# Patient Record
Sex: FEMALE | Race: WHITE | NOT HISPANIC OR LATINO | Employment: OTHER | URBAN - METROPOLITAN AREA
[De-identification: names, ages, dates, MRNs, and addresses within clinical notes are randomized per-mention and may not be internally consistent; named-entity substitution may affect disease eponyms.]

---

## 2023-05-15 ENCOUNTER — HOSPITAL ENCOUNTER (EMERGENCY)
Facility: HOSPITAL | Age: 73
Discharge: HOME | End: 2023-05-15
Attending: EMERGENCY MEDICINE
Payer: MEDICARE

## 2023-05-15 VITALS
OXYGEN SATURATION: 100 % | DIASTOLIC BLOOD PRESSURE: 75 MMHG | SYSTOLIC BLOOD PRESSURE: 171 MMHG | WEIGHT: 183 LBS | RESPIRATION RATE: 16 BRPM | HEIGHT: 64 IN | BODY MASS INDEX: 31.24 KG/M2 | TEMPERATURE: 98 F | HEART RATE: 99 BPM

## 2023-05-15 DIAGNOSIS — M54.2 NECK PAIN: ICD-10-CM

## 2023-05-15 PROCEDURE — 25000003 PHARM REV CODE 250: Performed by: EMERGENCY MEDICINE

## 2023-05-15 PROCEDURE — 96372 THER/PROPH/DIAG INJ SC/IM: CPT | Performed by: EMERGENCY MEDICINE

## 2023-05-15 PROCEDURE — 63600175 PHARM REV CODE 636 W HCPCS: Performed by: EMERGENCY MEDICINE

## 2023-05-15 PROCEDURE — 99284 EMERGENCY DEPT VISIT MOD MDM: CPT

## 2023-05-15 RX ORDER — OXYCODONE AND ACETAMINOPHEN 5; 325 MG/1; MG/1
1 TABLET ORAL EVERY 4 HOURS PRN
Qty: 18 TABLET | Refills: 0 | Status: SHIPPED | OUTPATIENT
Start: 2023-05-15 | End: 2023-11-15

## 2023-05-15 RX ORDER — DEXAMETHASONE SODIUM PHOSPHATE 4 MG/ML
8 INJECTION, SOLUTION INTRA-ARTICULAR; INTRALESIONAL; INTRAMUSCULAR; INTRAVENOUS; SOFT TISSUE
Status: COMPLETED | OUTPATIENT
Start: 2023-05-15 | End: 2023-05-15

## 2023-05-15 RX ORDER — HYDROMORPHONE HYDROCHLORIDE 1 MG/ML
1 INJECTION, SOLUTION INTRAMUSCULAR; INTRAVENOUS; SUBCUTANEOUS
Status: COMPLETED | OUTPATIENT
Start: 2023-05-15 | End: 2023-05-15

## 2023-05-15 RX ORDER — ONDANSETRON 4 MG/1
4 TABLET, ORALLY DISINTEGRATING ORAL
Status: COMPLETED | OUTPATIENT
Start: 2023-05-15 | End: 2023-05-15

## 2023-05-15 RX ADMIN — HYDROMORPHONE HYDROCHLORIDE 1 MG: 1 INJECTION, SOLUTION INTRAMUSCULAR; INTRAVENOUS; SUBCUTANEOUS at 07:05

## 2023-05-15 RX ADMIN — ONDANSETRON 4 MG: 4 TABLET, ORALLY DISINTEGRATING ORAL at 07:05

## 2023-05-15 RX ADMIN — DEXAMETHASONE SODIUM PHOSPHATE 8 MG: 4 INJECTION, SOLUTION INTRA-ARTICULAR; INTRALESIONAL; INTRAMUSCULAR; INTRAVENOUS; SOFT TISSUE at 07:05

## 2023-05-15 NOTE — ED PROVIDER NOTES
Encounter Date: 5/15/2023       History     Chief Complaint   Patient presents with    Neck Pain     Neck pain since Monday. Denies injury.      Patient with history of 2 previous cervical fusions.  Patient reports a proximally 1 week history of neck pain.  Pain increases at night.  No significant radicular symptoms including paresthesias or weakness to arms.  No new trauma.  Patient denies any chest pain shortness of breath.  Patient was seen at urgent care clinic and placed on Flexeril.  Flexeril is only marginally helping with pain.  Patient is intolerant of NSAIDs.  There is no fever chills.  No headache.  No focal weakness.    Review of patient's allergies indicates:   Allergen Reactions    Lyrica [pregabalin] Swelling    Sulfa (sulfonamide antibiotics) Swelling    Toradol [ketorolac] Swelling     Past Medical History:   Diagnosis Date    Anemia     Colon polyp     Kidney stone     Kidney stones 10/29/2012    Thyroid disease      Past Surgical History:   Procedure Laterality Date    BLADDER SURGERY      CHOLECYSTECTOMY      CYSTOSCOPY      HYSTERECTOMY      OOPHORECTOMY      tonsillectomy   1964     Family History   Problem Relation Age of Onset    Cancer Father     Cancer Paternal Grandfather     Heart disease Maternal Grandfather      Social History     Tobacco Use    Smoking status: Former     Years: 30.00     Types: Cigarettes     Quit date: 2001     Years since quittin.4   Substance Use Topics    Alcohol use: No    Drug use: No     Review of Systems   Constitutional:  Negative for chills and fever.   HENT:  Negative for congestion.    Eyes:  Negative for visual disturbance.   Respiratory:  Negative for shortness of breath.    Cardiovascular:  Negative for chest pain and palpitations.   Gastrointestinal:  Negative for abdominal pain and vomiting.   Genitourinary:  Negative for dysuria.   Musculoskeletal:  Negative for joint swelling.   Neurological:  Negative for headaches.    Psychiatric/Behavioral:  Negative for confusion.      Physical Exam     Initial Vitals [05/15/23 0224]   BP Pulse Resp Temp SpO2   (!) 155/78 79 19 98.2 °F (36.8 °C) 97 %      MAP       --         Physical Exam    Nursing note and vitals reviewed.  Constitutional: She is not diaphoretic. No distress.   HENT:   Head: Normocephalic and atraumatic.   Eyes: Conjunctivae are normal.   Neck:   There is some generalized posterior neck pain.  There is palpable spasm to right greater than left trapezius.  Palpation reproduces pain exactly.  Patient able to move neck with some pain.  There is no meningeal signs.  There is no crepitance.  No overlying rashes.   Cardiovascular:  Normal rate.           Pulmonary/Chest: Breath sounds normal.   Abdominal: Abdomen is soft. There is no abdominal tenderness.   Musculoskeletal:         General: Normal range of motion.      Comments: Both arms are neurovascular intact with 2+ radial pulses.  Full strength and sensation.  No edema.     Neurological: She is alert. She has normal strength. No cranial nerve deficit or sensory deficit.   No gross deficits   Skin: No rash noted.   Psychiatric: She has a normal mood and affect.       ED Course   Procedures  Labs Reviewed - No data to display       Imaging Results              X-Ray Cervical Spine Complete 5 view (Final result)  Result time 05/15/23 07:42:11   Procedure changed from X-Ray Cervical Spine AP And Lateral     Final result by Ashwin Vanegas MD (05/15/23 07:42:11)                   Narrative:    CLINICAL HISTORY:  72 years (1950) Female pain Pt complains of neck pain for 1 week, no trauma, pain worse on right side, pt unable to raise or move head very much    TECHNIQUE:  XR CERVICAL SPINE 4-5 VIEWS.  7 view(s) obtained.    COMPARISON:  None available.    FINDINGS:  C1 through C7 are visualized on the lateral radiograph. There is an anterior cervical discectomy fusion device (ACDF) extending from C4-C7 with 2  interlocking screws at each level and mature osseous fusion across the disc spaces at C4-C5, C5-C6 and C6-C7. No periprosthetic lucency or evidence of hardware loosening/failure is seen. There is mild to moderate disc height loss at C3-C4 with a large anterior adjacent endplate osteophyte. There is mild left-sided neural foraminal stenosis at C3-C4 and C6-C7 and mild right-sided neural foraminal stenosis at C2-C3. There is no acute fracture seen and no listhesis is measured. The lateral masses are symmetric about the dens. The prevertebral soft tissues are normal and the lung apices are clear.    IMPRESSION:  1. No acute osseous abnormality in the cervical spine.  2. Cervical fusion from C4-C7 with ACDF from C4-C6.                  .    Electronically signed by:  Ashwin Vanegas MD  5/15/2023 7:42 AM CDT Workstation: [id number]                                     Medications   HYDROmorphone injection 1 mg (1 mg Intramuscular Given 5/15/23 2317)   ondansetron disintegrating tablet 4 mg (4 mg Oral Given 5/15/23 6016)   dexAMETHasone injection 8 mg (8 mg Intramuscular Given 5/15/23 4120)     Medical Decision Making:   History:   Old Records Summarized: records from clinic visits.       <> Summary of Records: History of hypertension hypothyroidism  Differential Diagnosis:   Cervical radiculopathy, orthopedic hardware malfunction, infection  Independently Interpreted Test(s):   I have ordered and independently interpreted X-rays - see summary below.       <> Summary of X-Ray Reading(s): Prior cervical fusion with no acute abnormalities  ED Management:  Patient presents with neck pain.  She has no fever chills.  She has no risk factors for infection.  Clinically patient has cervical spasm.  Patient treated with corticosteroids given intolerance to NSAIDs.  After analgesics patient is feeling better.  Will begin oxycodone and hold Ultram.  Will refer back to her spine surgeon, Dr. Reyes.                        Clinical  Impression:   Final diagnoses:  [M54.2] Neck pain        ED Disposition Condition    Discharge Stable          ED Prescriptions       Medication Sig Dispense Start Date End Date Auth. Provider    oxyCODONE-acetaminophen (PERCOCET) 5-325 mg per tablet Take 1 tablet by mouth every 4 (four) hours as needed for Pain. 18 tablet 5/15/2023 -- Sascha Liang MD          Follow-up Information    None          Sascha Liang MD  05/15/23 4183

## 2023-05-15 NOTE — ED NOTES
Patient resting in bed, awake, alert, and oriented.  She moves stiffly due to 10/10 neck pain but showing full range of motion. Given cup of water after requested and MD navarrete'd. Explained POC including that we are waiting on CT results.

## 2023-07-11 ENCOUNTER — OFFICE VISIT (OUTPATIENT)
Dept: FAMILY MEDICINE | Facility: CLINIC | Age: 73
End: 2023-07-11
Payer: MEDICARE

## 2023-07-11 VITALS
HEART RATE: 60 BPM | OXYGEN SATURATION: 98 % | DIASTOLIC BLOOD PRESSURE: 72 MMHG | HEIGHT: 64 IN | SYSTOLIC BLOOD PRESSURE: 130 MMHG | WEIGHT: 187.19 LBS | TEMPERATURE: 98 F | BODY MASS INDEX: 31.96 KG/M2

## 2023-07-11 DIAGNOSIS — M79.89 LEG SWELLING: ICD-10-CM

## 2023-07-11 DIAGNOSIS — N18.30 STAGE 3 CHRONIC KIDNEY DISEASE, UNSPECIFIED WHETHER STAGE 3A OR 3B CKD: Primary | ICD-10-CM

## 2023-07-11 PROCEDURE — 1160F PR REVIEW ALL MEDS BY PRESCRIBER/CLIN PHARMACIST DOCUMENTED: ICD-10-PCS | Mod: CPTII,,,

## 2023-07-11 PROCEDURE — 1160F RVW MEDS BY RX/DR IN RCRD: CPT | Mod: CPTII,,,

## 2023-07-11 PROCEDURE — 1159F MED LIST DOCD IN RCRD: CPT | Mod: CPTII,,,

## 2023-07-11 PROCEDURE — 3008F BODY MASS INDEX DOCD: CPT | Mod: CPTII,,,

## 2023-07-11 PROCEDURE — 4010F PR ACE/ARB THEARPY RXD/TAKEN: ICD-10-PCS | Mod: CPTII,,,

## 2023-07-11 PROCEDURE — 1159F PR MEDICATION LIST DOCUMENTED IN MEDICAL RECORD: ICD-10-PCS | Mod: CPTII,,,

## 2023-07-11 PROCEDURE — 1101F PR PT FALLS ASSESS DOC 0-1 FALLS W/OUT INJ PAST YR: ICD-10-PCS | Mod: CPTII,,,

## 2023-07-11 PROCEDURE — 99214 PR OFFICE/OUTPT VISIT, EST, LEVL IV, 30-39 MIN: ICD-10-PCS | Mod: ,,,

## 2023-07-11 PROCEDURE — 1125F AMNT PAIN NOTED PAIN PRSNT: CPT | Mod: CPTII,,,

## 2023-07-11 PROCEDURE — 3288F PR FALLS RISK ASSESSMENT DOCUMENTED: ICD-10-PCS | Mod: CPTII,,,

## 2023-07-11 PROCEDURE — 4010F ACE/ARB THERAPY RXD/TAKEN: CPT | Mod: CPTII,,,

## 2023-07-11 PROCEDURE — 3008F PR BODY MASS INDEX (BMI) DOCUMENTED: ICD-10-PCS | Mod: CPTII,,,

## 2023-07-11 PROCEDURE — 1125F PR PAIN SEVERITY QUANTIFIED, PAIN PRESENT: ICD-10-PCS | Mod: CPTII,,,

## 2023-07-11 PROCEDURE — 1101F PT FALLS ASSESS-DOCD LE1/YR: CPT | Mod: CPTII,,,

## 2023-07-11 PROCEDURE — 3075F SYST BP GE 130 - 139MM HG: CPT | Mod: CPTII,,,

## 2023-07-11 PROCEDURE — 3078F PR MOST RECENT DIASTOLIC BLOOD PRESSURE < 80 MM HG: ICD-10-PCS | Mod: CPTII,,,

## 2023-07-11 PROCEDURE — 3078F DIAST BP <80 MM HG: CPT | Mod: CPTII,,,

## 2023-07-11 PROCEDURE — 3075F PR MOST RECENT SYSTOLIC BLOOD PRESS GE 130-139MM HG: ICD-10-PCS | Mod: CPTII,,,

## 2023-07-11 PROCEDURE — 3288F FALL RISK ASSESSMENT DOCD: CPT | Mod: CPTII,,,

## 2023-07-11 PROCEDURE — 99214 OFFICE O/P EST MOD 30 MIN: CPT | Mod: ,,,

## 2023-07-11 RX ORDER — ASPIRIN 325 MG
50000 TABLET, DELAYED RELEASE (ENTERIC COATED) ORAL
COMMUNITY
End: 2023-12-09 | Stop reason: SDUPTHER

## 2023-07-11 RX ORDER — PRAMIPEXOLE DIHYDROCHLORIDE 0.5 MG/1
0.5 TABLET ORAL NIGHTLY
COMMUNITY
Start: 2023-03-22

## 2023-07-11 RX ORDER — TRAZODONE HYDROCHLORIDE 50 MG/1
50 TABLET ORAL NIGHTLY
COMMUNITY

## 2023-07-11 NOTE — PROGRESS NOTES
Subjective:       Patient ID: Darshana Alicea is a 72 y.o. female.    Chief Complaint: Rash (Both ankles.some pain no itching .), Edema (Both ankles. Started about 1 week ago. Denies any sob, no chest pain. Hx: crf-stage 3. Pt seeing nephrologist . Labs and u/a done yesterday at [place] for nephrologist), and Neck Pain (Restarted the mobic. Pt feels is causing her swelling but not sure.)    Patient presents to the clinic with complaint of rash and swelling to lower extremities.     Swelling to BLE- started 7-10 days ago. Has pink rash to left ankle. She has CRF and follows with Nephrology. She sees Dr. Pimentel and sees him next week.   States she started taking Mobic recently due to neck pain. She does take Lasix PRN but has not taken any lately.     Patient educated on plan of care, verbalized understanding.      Review of Systems   Constitutional:  Negative for activity change, appetite change, chills, diaphoresis and fever.   HENT:  Negative for congestion, ear pain, postnasal drip, sinus pressure, sneezing and sore throat.    Eyes:  Negative for pain, discharge, redness and itching.   Respiratory:  Negative for apnea, cough, chest tightness, shortness of breath and wheezing.    Cardiovascular:  Positive for leg swelling. Negative for chest pain.   Gastrointestinal:  Negative for abdominal distention, abdominal pain, constipation, diarrhea, nausea and vomiting.   Genitourinary:  Negative for difficulty urinating, dysuria, flank pain and frequency.   Musculoskeletal:  Positive for arthralgias, myalgias, neck pain and neck stiffness.   Skin:  Positive for rash. Negative for color change and wound.   Neurological:  Negative for dizziness.   All other systems reviewed and are negative.    Patient Active Problem List   Diagnosis    Kidney stones    Stage 3 chronic kidney disease       Objective:      Physical Exam  Vitals and nursing note reviewed.   Constitutional:       General: She is not in acute distress.      "Appearance: Normal appearance. She is well-developed.   HENT:      Head: Normocephalic.      Nose: Nose normal.   Eyes:      Conjunctiva/sclera: Conjunctivae normal.      Pupils: Pupils are equal, round, and reactive to light.   Cardiovascular:      Rate and Rhythm: Normal rate and regular rhythm.      Heart sounds: Normal heart sounds.      Comments: Mild edema noted to BLE  Pulmonary:      Effort: Pulmonary effort is normal. No respiratory distress.      Breath sounds: Normal breath sounds.   Musculoskeletal:      Cervical back: Normal range of motion and neck supple.      Right lower le+ Edema present.      Left lower le+ Edema present.   Skin:     General: Skin is warm and dry.      Findings: Rash present. Rash is macular.      Comments: Pink macular rash noted to ankle   Neurological:      Mental Status: She is alert and oriented to person, place, and time.   Psychiatric:         Behavior: Behavior normal.       Lab Results   Component Value Date    BUN 17 2022     The ASCVD Risk score (Monica PILLAI, et al., 2019) failed to calculate for the following reasons:    Cannot find a previous HDL lab    Cannot find a previous total cholesterol lab  Visit Vitals  /72 (BP Location: Left arm, Patient Position: Sitting, BP Method: Medium (Manual))   Pulse 60   Temp 98.2 °F (36.8 °C) (Temporal)   Ht 5' 4" (1.626 m)   Wt 84.9 kg (187 lb 2.7 oz)   SpO2 98%   BMI 32.13 kg/m²      Assessment:       1. Stage 3 chronic kidney disease, unspecified whether stage 3a or 3b CKD    2. Leg swelling        Plan:       1. Stage 3 chronic kidney disease, unspecified whether stage 3a or 3b CKD   - Labs stable   - Discontinue Mobic- explained to patient that with her CRF she should avoid NSAIDS   - Follow up with Nephrology- Dr. Pimentel    2. Leg swelling   - Low sodium diet   - Compression stockings   - Elevate legs   - Has Lasix that she takes PRN     Follow up if symptoms worsen or fail to improve.             "

## 2023-07-13 NOTE — PATIENT INSTRUCTIONS

## 2023-07-31 ENCOUNTER — OFFICE VISIT (OUTPATIENT)
Dept: ORTHOPEDICS | Facility: CLINIC | Age: 73
End: 2023-07-31
Payer: MEDICARE

## 2023-07-31 VITALS — HEIGHT: 64 IN | BODY MASS INDEX: 30.73 KG/M2 | WEIGHT: 180 LBS

## 2023-07-31 DIAGNOSIS — M54.12 CERVICAL RADICULOPATHY: Primary | ICD-10-CM

## 2023-07-31 DIAGNOSIS — M51.36 DISC DEGENERATION, LUMBAR: ICD-10-CM

## 2023-07-31 DIAGNOSIS — M50.30 DISC DISEASE, DEGENERATIVE, CERVICAL: ICD-10-CM

## 2023-07-31 DIAGNOSIS — Z98.1 HISTORY OF FUSION OF CERVICAL SPINE: ICD-10-CM

## 2023-07-31 PROCEDURE — 1101F PR PT FALLS ASSESS DOC 0-1 FALLS W/OUT INJ PAST YR: ICD-10-PCS | Mod: CPTII,S$GLB,, | Performed by: ORTHOPAEDIC SURGERY

## 2023-07-31 PROCEDURE — 1159F MED LIST DOCD IN RCRD: CPT | Mod: CPTII,S$GLB,, | Performed by: ORTHOPAEDIC SURGERY

## 2023-07-31 PROCEDURE — 1160F PR REVIEW ALL MEDS BY PRESCRIBER/CLIN PHARMACIST DOCUMENTED: ICD-10-PCS | Mod: CPTII,S$GLB,, | Performed by: ORTHOPAEDIC SURGERY

## 2023-07-31 PROCEDURE — 3008F PR BODY MASS INDEX (BMI) DOCUMENTED: ICD-10-PCS | Mod: CPTII,S$GLB,, | Performed by: ORTHOPAEDIC SURGERY

## 2023-07-31 PROCEDURE — 99203 OFFICE O/P NEW LOW 30 MIN: CPT | Mod: S$GLB,,, | Performed by: ORTHOPAEDIC SURGERY

## 2023-07-31 PROCEDURE — 1125F AMNT PAIN NOTED PAIN PRSNT: CPT | Mod: CPTII,S$GLB,, | Performed by: ORTHOPAEDIC SURGERY

## 2023-07-31 PROCEDURE — 3008F BODY MASS INDEX DOCD: CPT | Mod: CPTII,S$GLB,, | Performed by: ORTHOPAEDIC SURGERY

## 2023-07-31 PROCEDURE — 1160F RVW MEDS BY RX/DR IN RCRD: CPT | Mod: CPTII,S$GLB,, | Performed by: ORTHOPAEDIC SURGERY

## 2023-07-31 PROCEDURE — 4010F ACE/ARB THERAPY RXD/TAKEN: CPT | Mod: CPTII,S$GLB,, | Performed by: ORTHOPAEDIC SURGERY

## 2023-07-31 PROCEDURE — 3288F FALL RISK ASSESSMENT DOCD: CPT | Mod: CPTII,S$GLB,, | Performed by: ORTHOPAEDIC SURGERY

## 2023-07-31 PROCEDURE — 4010F PR ACE/ARB THEARPY RXD/TAKEN: ICD-10-PCS | Mod: CPTII,S$GLB,, | Performed by: ORTHOPAEDIC SURGERY

## 2023-07-31 PROCEDURE — 99203 PR OFFICE/OUTPT VISIT, NEW, LEVL III, 30-44 MIN: ICD-10-PCS | Mod: S$GLB,,, | Performed by: ORTHOPAEDIC SURGERY

## 2023-07-31 PROCEDURE — 1125F PR PAIN SEVERITY QUANTIFIED, PAIN PRESENT: ICD-10-PCS | Mod: CPTII,S$GLB,, | Performed by: ORTHOPAEDIC SURGERY

## 2023-07-31 PROCEDURE — 1159F PR MEDICATION LIST DOCUMENTED IN MEDICAL RECORD: ICD-10-PCS | Mod: CPTII,S$GLB,, | Performed by: ORTHOPAEDIC SURGERY

## 2023-07-31 PROCEDURE — 1101F PT FALLS ASSESS-DOCD LE1/YR: CPT | Mod: CPTII,S$GLB,, | Performed by: ORTHOPAEDIC SURGERY

## 2023-07-31 PROCEDURE — 3288F PR FALLS RISK ASSESSMENT DOCUMENTED: ICD-10-PCS | Mod: CPTII,S$GLB,, | Performed by: ORTHOPAEDIC SURGERY

## 2023-07-31 RX ORDER — ERGOCALCIFEROL 1.25 MG/1
50000 CAPSULE ORAL
COMMUNITY
Start: 2023-07-01

## 2023-07-31 RX ORDER — HYDROCHLOROTHIAZIDE 12.5 MG/1
12.5 CAPSULE ORAL DAILY
COMMUNITY
Start: 2023-03-24

## 2023-07-31 RX ORDER — CICLOPIROX 80 MG/ML
1 SOLUTION TOPICAL NIGHTLY
COMMUNITY
Start: 2023-07-07

## 2023-07-31 RX ORDER — POLYETHYLENE GLYCOL 3350 17 G/17G
POWDER, FOR SOLUTION ORAL
COMMUNITY
Start: 2023-03-27 | End: 2023-12-09

## 2023-07-31 RX ORDER — ROSUVASTATIN CALCIUM 20 MG/1
1 TABLET, COATED ORAL DAILY
COMMUNITY

## 2023-07-31 RX ORDER — DICLOFENAC SODIUM 10 MG/G
GEL TOPICAL
COMMUNITY
End: 2023-12-09

## 2023-07-31 RX ORDER — AMLODIPINE AND BENAZEPRIL HYDROCHLORIDE 5; 40 MG/1; MG/1
1 CAPSULE ORAL DAILY
COMMUNITY
Start: 2023-06-13

## 2023-07-31 RX ORDER — AMLODIPINE AND BENAZEPRIL HYDROCHLORIDE 5; 40 MG/1; MG/1
1 CAPSULE ORAL DAILY
COMMUNITY
Start: 2023-03-10 | End: 2023-12-09 | Stop reason: SDUPTHER

## 2023-07-31 NOTE — PROGRESS NOTES
Subjective:       Patient ID: Darshana Alicea is a 72 y.o. female.    Chief Complaint: Pain of the Neck (Neck and low back pain x 3 months, weakness to arms and legs, no arm pain, pain down b/l legs to knees, numbness to arms and legs, has neuropathy, XR to C/sp, previous ACDF w/ Dr Reyes, about 15 years ago) and Pain of the Lumbar Spine      History of Present Illness    Prior to meeting with the patient I reviewed the medical chart in [facility]. This included reviewing the previous progress notes from our office, review of the patient's last appointment with their primary care provider, review of any visits to the emergency room, and review of any pain management appointments or procedures.   Previous history of anterior cervical fusion performed by myself 15 years ago was doing well until 3 months ago when she began having pain in her neck radiating to arms does have a history of neuropathy of the arms and legs does complain of low back pain as well.    Current Medications  Current Outpatient Medications   Medication Sig Dispense Refill    amlodipine (NORVASC) 10 MG tablet Take 10 mg by mouth once daily.        amLODIPine-benazepriL (LOTREL) 5-40 mg per capsule Take 1 capsule by mouth once daily.      amLODIPine-benazepriL (LOTREL) 5-40 mg per capsule Take 1 capsule by mouth.      cholecalciferol, vitamin D3, 1,250 mcg (50,000 unit) capsule Take 50,000 Units by mouth.      ciclopirox (PENLAC) 8 % Soln Apply topically.      diclofenac sodium (VOLTAREN) 1 % Gel Voltaren 1 % topical gel   APPLY 2 GRAM TO THE AFFECTED AREA(S) BY TOPICAL ROUTE 4 TIMES PER DAY as needed      ergocalciferol (ERGOCALCIFEROL) 50,000 unit Cap Take 50,000 Units by mouth every 7 days.      hydroCHLOROthiazide (MICROZIDE) 12.5 mg capsule Take 12.5 mg by mouth.      levothyroxine (SYNTHROID) 88 MCG tablet Take 88 mcg by mouth once daily.        nitroGLYCERIN (NITROSTAT) 0.4 MG SL tablet Place 0.4 mg under the tongue every 5 (five) minutes as needed.         polyethylene glycol (MIRALAX) 17 gram/dose powder Take 17 g every day by oral route for 90 days.      pramipexole (MIRAPEX) 0.5 MG tablet Take 0.5 mg by mouth every evening.      rosuvastatin (CRESTOR) 20 MG tablet Take 1 tablet by mouth once daily.      simvastatin (ZOCOR) 20 MG tablet Take 20 mg by mouth every evening.        traZODone (DESYREL) 50 MG tablet trazodone 50 mg tablet   one at night for up to three nights, then two at night for three night, then if needed increase to three at night.      furosemide (LASIX) 20 MG tablet Take 10 mg by mouth once daily.        oxyCODONE-acetaminophen (PERCOCET) 5-325 mg per tablet Take 1 tablet by mouth every 4 (four) hours as needed for Pain. 18 tablet 0     No current facility-administered medications for this visit.       Allergies  Review of patient's allergies indicates:   Allergen Reactions    Lyrica [pregabalin] Swelling    Sulfa (sulfonamide antibiotics) Swelling    Toradol [ketorolac] Swelling       Past Medical History  Past Medical History:   Diagnosis Date    Anemia     Colon polyp     Kidney stone     Kidney stones 10/29/2012    Thyroid disease        Surgical History  Past Surgical History:   Procedure Laterality Date    BLADDER SURGERY      CHOLECYSTECTOMY      CYSTOSCOPY      HYSTERECTOMY      OOPHORECTOMY      tonsillectomy   1964       Family History:   Family History   Problem Relation Age of Onset    Cancer Father     Cancer Paternal Grandfather     Heart disease Maternal Grandfather        Social History:   Social History     Socioeconomic History    Marital status:    Tobacco Use    Smoking status: Former     Current packs/day: 0.00     Types: Cigarettes     Start date: 1971     Quit date: 2001     Years since quittin.6   Substance and Sexual Activity    Alcohol use: No    Drug use: No       Hospitalization/Major Diagnostic Procedure:     Review of Systems     General/Constitutional:  Chills denies. Fatigue denies. Fever  denies. Weight gain denies. Weight loss denies.    Respiratory:  Shortness of breath denies.    Cardiovascular:  Chest pain denies.    Gastrointestinal:  Constipation denies. Diarrhea denies. Nausea denies. Vomiting denies.     Hematology:  Easy bruising denies. Prolonged bleeding denies.     Genitourinary:  Frequent urination denies. Pain in lower back denies. Painful urination denies.     Musculoskeletal:  See HPI for details    Skin:  Rash denies.    Neurologic:  Dizziness denies. Gait abnormalities denies. Seizures denies. Tingling/Numbess denies.    Psychiatric:  Anxiety denies. Depressed mood denies.     Objective:   Vital Signs: There were no vitals filed for this visit.     Physical Exam      General Examination:     Constitutional: The patient is alert and oriented to lace person and time. Mood is pleasant.     Head/Face: Normal facial features normal eyebrows    Eyes: Normal extraocular motion bilaterally    Lungs: Respirations are equal and unlabored    Gait is coordinated.    Cardiovascular: There are no swelling or varicosities present.    Lymphatic: Negative for adenopathy    Skin: Normal    Neurological: Level of consciousness normal. Oriented to place person and time and situation    Psychiatric: Oriented to time place person and situation    Well-healed right-sided cervical incision nontender range of motion mildly restricted tenderness on right trapezius muscle group mild spasm Spurling's maneuver positive on the right side motor exam normal both upper extremities lumbar exam tender both posterior iliac spines without spasm range of motion mild restriction of extension and bending no pain with flexion straight-leg-raising negative except for back pain hip and knee range of motion intact    XRAY Report/ Interpretation :  AP and lateral x-rays of the cervical spine were performed today. Indications neck pain. Findings:  Anterior cervical fusion C4-C6 with plate fixation mild degenerative changes with  anterior spurring C3-4 level.  C6-7 level not adequately visualized.  AP and lateral x-rays of lumbar spine will performed today. Indications low back pain. Findings:  Mild disc space narrowing L5-S1 multilevel facet joint arthritis L3-S1      Assessment:       1. Cervical radiculopathy    2. Disc degeneration, lumbar    3. History of fusion of cervical spine    4. Disc disease, degenerative, cervical        Plan:       Darshana was seen today for pain and pain.    Diagnoses and all orders for this visit:    Cervical radiculopathy    Disc degeneration, lumbar  -     X-Ray Lumbar Spine Ap And Lateral  -     X-Ray Pelvis Routine AP    History of fusion of cervical spine  Comments:  C4-6  Orders:  -     MRI Cervical Spine Without Contrast; Future    Disc disease, degenerative, cervical  -     MRI Cervical Spine Without Contrast; Future         Follow up for MRI Cervical Results.    Patient probably has adjacent segment cervical radiculopathies at C3-4 are C6-7.  I cannot visualize C6-7 adequately by x-ray.  Cervical MRI study is advised.  I observe low back pain at this time.  Treatment options were discussed with regards to the nature of the medical condition. Conservative pain intervention and surgical options were discussed in detail. The probability of success of each separate treatment option was discussed. The patient expressed a clear understanding of the treatment options. With regards to surgery, the procedure risk, benefits, complications, and outcomes were discussed. No guarantees were given with regards to surgical outcome.   The risk of complications, morbidity, and mortality of patient management decisions have been made at the time of this visit. These are associated with the patient's problems, diagnostic procedures and treatment options. This includes the possible management options selected and those considered but not selected by the patient after shared medical decision making we discussed with the  patient.   This note was created using Dragon voice recognition software that occasionally misinterpreted phrases or words.

## 2023-08-03 ENCOUNTER — HOSPITAL ENCOUNTER (OUTPATIENT)
Dept: RADIOLOGY | Facility: HOSPITAL | Age: 73
Discharge: HOME | End: 2023-08-03
Attending: ORTHOPAEDIC SURGERY
Payer: MEDICARE

## 2023-08-03 DIAGNOSIS — Z98.1 HISTORY OF FUSION OF CERVICAL SPINE: ICD-10-CM

## 2023-08-03 DIAGNOSIS — M50.30 DISC DISEASE, DEGENERATIVE, CERVICAL: ICD-10-CM

## 2023-08-03 PROCEDURE — 72141 MRI NECK SPINE W/O DYE: CPT | Mod: TC,PO

## 2023-08-16 ENCOUNTER — OFFICE VISIT (OUTPATIENT)
Dept: ORTHOPEDICS | Facility: CLINIC | Age: 73
End: 2023-08-16
Payer: MEDICARE

## 2023-08-16 VITALS — WEIGHT: 180 LBS | HEIGHT: 64 IN | BODY MASS INDEX: 30.73 KG/M2

## 2023-08-16 DIAGNOSIS — M54.12 CERVICAL RADICULOPATHY: ICD-10-CM

## 2023-08-16 DIAGNOSIS — M50.30 DISC DISEASE, DEGENERATIVE, CERVICAL: Primary | ICD-10-CM

## 2023-08-16 DIAGNOSIS — Z98.1 HISTORY OF FUSION OF CERVICAL SPINE: ICD-10-CM

## 2023-08-16 PROCEDURE — 1160F RVW MEDS BY RX/DR IN RCRD: CPT | Mod: CPTII,S$GLB,, | Performed by: ORTHOPAEDIC SURGERY

## 2023-08-16 PROCEDURE — 99213 OFFICE O/P EST LOW 20 MIN: CPT | Mod: S$GLB,,, | Performed by: ORTHOPAEDIC SURGERY

## 2023-08-16 PROCEDURE — 99213 PR OFFICE/OUTPT VISIT, EST, LEVL III, 20-29 MIN: ICD-10-PCS | Mod: S$GLB,,, | Performed by: ORTHOPAEDIC SURGERY

## 2023-08-16 PROCEDURE — 4010F PR ACE/ARB THEARPY RXD/TAKEN: ICD-10-PCS | Mod: CPTII,S$GLB,, | Performed by: ORTHOPAEDIC SURGERY

## 2023-08-16 PROCEDURE — 3008F PR BODY MASS INDEX (BMI) DOCUMENTED: ICD-10-PCS | Mod: CPTII,S$GLB,, | Performed by: ORTHOPAEDIC SURGERY

## 2023-08-16 PROCEDURE — 4010F ACE/ARB THERAPY RXD/TAKEN: CPT | Mod: CPTII,S$GLB,, | Performed by: ORTHOPAEDIC SURGERY

## 2023-08-16 PROCEDURE — 1125F PR PAIN SEVERITY QUANTIFIED, PAIN PRESENT: ICD-10-PCS | Mod: CPTII,S$GLB,, | Performed by: ORTHOPAEDIC SURGERY

## 2023-08-16 PROCEDURE — 3288F PR FALLS RISK ASSESSMENT DOCUMENTED: ICD-10-PCS | Mod: CPTII,S$GLB,, | Performed by: ORTHOPAEDIC SURGERY

## 2023-08-16 PROCEDURE — 1159F PR MEDICATION LIST DOCUMENTED IN MEDICAL RECORD: ICD-10-PCS | Mod: CPTII,S$GLB,, | Performed by: ORTHOPAEDIC SURGERY

## 2023-08-16 PROCEDURE — 3008F BODY MASS INDEX DOCD: CPT | Mod: CPTII,S$GLB,, | Performed by: ORTHOPAEDIC SURGERY

## 2023-08-16 PROCEDURE — 1125F AMNT PAIN NOTED PAIN PRSNT: CPT | Mod: CPTII,S$GLB,, | Performed by: ORTHOPAEDIC SURGERY

## 2023-08-16 PROCEDURE — 3288F FALL RISK ASSESSMENT DOCD: CPT | Mod: CPTII,S$GLB,, | Performed by: ORTHOPAEDIC SURGERY

## 2023-08-16 PROCEDURE — 1101F PR PT FALLS ASSESS DOC 0-1 FALLS W/OUT INJ PAST YR: ICD-10-PCS | Mod: CPTII,S$GLB,, | Performed by: ORTHOPAEDIC SURGERY

## 2023-08-16 PROCEDURE — 1160F PR REVIEW ALL MEDS BY PRESCRIBER/CLIN PHARMACIST DOCUMENTED: ICD-10-PCS | Mod: CPTII,S$GLB,, | Performed by: ORTHOPAEDIC SURGERY

## 2023-08-16 PROCEDURE — 1101F PT FALLS ASSESS-DOCD LE1/YR: CPT | Mod: CPTII,S$GLB,, | Performed by: ORTHOPAEDIC SURGERY

## 2023-08-16 PROCEDURE — 1159F MED LIST DOCD IN RCRD: CPT | Mod: CPTII,S$GLB,, | Performed by: ORTHOPAEDIC SURGERY

## 2023-08-16 RX ORDER — TRAMADOL HYDROCHLORIDE 50 MG/1
50 TABLET ORAL EVERY 12 HOURS PRN
Qty: 28 TABLET | Refills: 0 | Status: SHIPPED | OUTPATIENT
Start: 2023-08-16 | End: 2023-08-30

## 2023-08-16 NOTE — PROGRESS NOTES
Subjective:       Patient ID: Darshana Alicea is a 72 y.o. female.    Chief Complaint: Pain of the Neck (Cervical pain follow up. Here for MRI results review. Denies any new symptoms. States that the pain has not been radiating down her arms. Continues to have painful and limited ROM//)      History of Present Illness    Prior to meeting with the patient I reviewed the medical chart in [facility]. This included reviewing the previous progress notes from our office, review of the patient's last appointment with their primary care provider, review of any visits to the emergency room, and review of any pain management appointments or procedures.   Continued neck pain here for follow-up previous history of cervical fusion C4-C6.    Current Medications  Current Outpatient Medications   Medication Sig Dispense Refill    amLODIPine-benazepriL (LOTREL) 5-40 mg per capsule Take 1 capsule by mouth once daily.      amLODIPine-benazepriL (LOTREL) 5-40 mg per capsule Take 1 capsule by mouth.      cholecalciferol, vitamin D3, 1,250 mcg (50,000 unit) capsule Take 50,000 Units by mouth.      ciclopirox (PENLAC) 8 % Soln Apply topically.      diclofenac sodium (VOLTAREN) 1 % Gel Voltaren 1 % topical gel   APPLY 2 GRAM TO THE AFFECTED AREA(S) BY TOPICAL ROUTE 4 TIMES PER DAY as needed      ergocalciferol (ERGOCALCIFEROL) 50,000 unit Cap Take 50,000 Units by mouth every 7 days.      hydroCHLOROthiazide (MICROZIDE) 12.5 mg capsule Take 12.5 mg by mouth.      levothyroxine (SYNTHROID) 88 MCG tablet Take 88 mcg by mouth once daily.        nitroGLYCERIN (NITROSTAT) 0.4 MG SL tablet Place 0.4 mg under the tongue every 5 (five) minutes as needed.        polyethylene glycol (MIRALAX) 17 gram/dose powder Take 17 g every day by oral route for 90 days.      pramipexole (MIRAPEX) 0.5 MG tablet Take 0.5 mg by mouth every evening.      rosuvastatin (CRESTOR) 20 MG tablet Take 1 tablet by mouth once daily.      simvastatin (ZOCOR) 20 MG tablet Take 20 mg by  mouth every evening.        traZODone (DESYREL) 50 MG tablet trazodone 50 mg tablet   one at night for up to three nights, then two at night for three night, then if needed increase to three at night.      amlodipine (NORVASC) 10 MG tablet Take 10 mg by mouth once daily.        furosemide (LASIX) 20 MG tablet Take 10 mg by mouth once daily.        oxyCODONE-acetaminophen (PERCOCET) 5-325 mg per tablet Take 1 tablet by mouth every 4 (four) hours as needed for Pain. 18 tablet 0    traMADoL (ULTRAM) 50 mg tablet Take 1 tablet (50 mg total) by mouth every 12 (twelve) hours as needed for Pain. 28 tablet 0     No current facility-administered medications for this visit.       Allergies  Review of patient's allergies indicates:   Allergen Reactions    Lyrica [pregabalin] Swelling    Sulfa (sulfonamide antibiotics) Swelling    Toradol [ketorolac] Swelling       Past Medical History  Past Medical History:   Diagnosis Date    Anemia     Colon polyp     Kidney stone     Kidney stones 10/29/2012    Thyroid disease        Surgical History  Past Surgical History:   Procedure Laterality Date    BLADDER SURGERY      CHOLECYSTECTOMY      CYSTOSCOPY      HYSTERECTOMY      OOPHORECTOMY      tonsillectomy   1964       Family History:   Family History   Problem Relation Age of Onset    Cancer Father     Cancer Paternal Grandfather     Heart disease Maternal Grandfather        Social History:   Social History     Socioeconomic History    Marital status:    Tobacco Use    Smoking status: Former     Current packs/day: 0.00     Types: Cigarettes     Start date: 1971     Quit date: 2001     Years since quittin.7   Substance and Sexual Activity    Alcohol use: No    Drug use: No       Hospitalization/Major Diagnostic Procedure:     Review of Systems     General/Constitutional:  Chills denies. Fatigue denies. Fever denies. Weight gain denies. Weight loss denies.    Respiratory:  Shortness of breath  denies.    Cardiovascular:  Chest pain denies.    Gastrointestinal:  Constipation denies. Diarrhea denies. Nausea denies. Vomiting denies.     Hematology:  Easy bruising denies. Prolonged bleeding denies.     Genitourinary:  Frequent urination denies. Pain in lower back denies. Painful urination denies.     Musculoskeletal:  See HPI for details    Skin:  Rash denies.    Neurologic:  Dizziness denies. Gait abnormalities denies. Seizures denies. Tingling/Numbess denies.    Psychiatric:  Anxiety denies. Depressed mood denies.     Objective:   Vital Signs: There were no vitals filed for this visit.     Physical Exam      General Examination:     Constitutional: The patient is alert and oriented to lace person and time. Mood is pleasant.     Head/Face: Normal facial features normal eyebrows    Eyes: Normal extraocular motion bilaterally    Lungs: Respirations are equal and unlabored    Gait is coordinated.    Cardiovascular: There are no swelling or varicosities present.    Lymphatic: Negative for adenopathy    Skin: Normal    Neurological: Level of consciousness normal. Oriented to place person and time and situation    Psychiatric: Oriented to time place person and situation    Right-sided incision well-healed nontender posterior cervical paraspinous tenderness both trapezius tenderness range of motion limited Spurling's maneuver negative  XRAY Report/ Interpretation:  Cervical MRI per report personally reviewed disc degeneration C3-4 anterior osteophyte very large adjacent and then to the C3 vertebral body.  Radiologist's mention that might be a fracture but actually it is a large osteophyte.      Assessment:       1. Disc disease, degenerative, cervical    2. History of fusion of cervical spine    3. Cervical radiculopathy        Plan:       Darshana was seen today for pain.    Diagnoses and all orders for this visit:    Disc disease, degenerative, cervical  Comments:  C3-4    History of fusion of cervical  spine    Cervical radiculopathy    Other orders  -     traMADoL (ULTRAM) 50 mg tablet; Take 1 tablet (50 mg total) by mouth every 12 (twelve) hours as needed for Pain.         Follow up in about 4 weeks (around 9/13/2023) for Cervical f/u.    Symptoms compatible adjacent segment disc degeneration at C3-4 we discussed treatment options with pain management referral however she does feel symptoms are that severe.  Will start on some oral medication but she has been advised to avoid anti-inflammatories because of a decrease in kidney function therefore prescription for tramadol given to be used sparingly patient will consider referral to pain management if symptoms worsen  Treatment options were discussed with regards to the nature of the medical condition. Conservative pain intervention and surgical options were discussed in detail. The probability of success of each separate treatment option was discussed. The patient expressed a clear understanding of the treatment options. With regards to surgery, the procedure risk, benefits, complications, and outcomes were discussed. No guarantees were given with regards to surgical outcome.   The risk of complications, morbidity, and mortality of patient management decisions have been made at the time of this visit. These are associated with the patient's problems, diagnostic procedures and treatment options. This includes the possible management options selected and those considered but not selected by the patient after shared medical decision making we discussed with the patient.     This note was created using Dragon voice recognition software that occasionally misinterpreted phrases or words.

## 2023-10-11 ENCOUNTER — OFFICE VISIT (OUTPATIENT)
Dept: ORTHOPEDICS | Facility: CLINIC | Age: 73
End: 2023-10-11
Payer: MEDICARE

## 2023-10-11 VITALS — BODY MASS INDEX: 30.73 KG/M2 | HEIGHT: 64 IN | WEIGHT: 180 LBS

## 2023-10-11 DIAGNOSIS — Z98.1 HISTORY OF FUSION OF CERVICAL SPINE: Primary | ICD-10-CM

## 2023-10-11 DIAGNOSIS — M54.12 CERVICAL RADICULOPATHY: ICD-10-CM

## 2023-10-11 DIAGNOSIS — M50.30 DISC DISEASE, DEGENERATIVE, CERVICAL: ICD-10-CM

## 2023-10-11 PROCEDURE — 1125F AMNT PAIN NOTED PAIN PRSNT: CPT | Mod: CPTII,S$GLB,, | Performed by: ORTHOPAEDIC SURGERY

## 2023-10-11 PROCEDURE — 3288F FALL RISK ASSESSMENT DOCD: CPT | Mod: CPTII,S$GLB,, | Performed by: ORTHOPAEDIC SURGERY

## 2023-10-11 PROCEDURE — 4010F PR ACE/ARB THEARPY RXD/TAKEN: ICD-10-PCS | Mod: CPTII,S$GLB,, | Performed by: ORTHOPAEDIC SURGERY

## 2023-10-11 PROCEDURE — 1160F RVW MEDS BY RX/DR IN RCRD: CPT | Mod: CPTII,S$GLB,, | Performed by: ORTHOPAEDIC SURGERY

## 2023-10-11 PROCEDURE — 1159F PR MEDICATION LIST DOCUMENTED IN MEDICAL RECORD: ICD-10-PCS | Mod: CPTII,S$GLB,, | Performed by: ORTHOPAEDIC SURGERY

## 2023-10-11 PROCEDURE — 99213 PR OFFICE/OUTPT VISIT, EST, LEVL III, 20-29 MIN: ICD-10-PCS | Mod: S$GLB,,, | Performed by: ORTHOPAEDIC SURGERY

## 2023-10-11 PROCEDURE — 99213 OFFICE O/P EST LOW 20 MIN: CPT | Mod: S$GLB,,, | Performed by: ORTHOPAEDIC SURGERY

## 2023-10-11 PROCEDURE — 1160F PR REVIEW ALL MEDS BY PRESCRIBER/CLIN PHARMACIST DOCUMENTED: ICD-10-PCS | Mod: CPTII,S$GLB,, | Performed by: ORTHOPAEDIC SURGERY

## 2023-10-11 PROCEDURE — 3008F PR BODY MASS INDEX (BMI) DOCUMENTED: ICD-10-PCS | Mod: CPTII,S$GLB,, | Performed by: ORTHOPAEDIC SURGERY

## 2023-10-11 PROCEDURE — 3288F PR FALLS RISK ASSESSMENT DOCUMENTED: ICD-10-PCS | Mod: CPTII,S$GLB,, | Performed by: ORTHOPAEDIC SURGERY

## 2023-10-11 PROCEDURE — 1101F PR PT FALLS ASSESS DOC 0-1 FALLS W/OUT INJ PAST YR: ICD-10-PCS | Mod: CPTII,S$GLB,, | Performed by: ORTHOPAEDIC SURGERY

## 2023-10-11 PROCEDURE — 1101F PT FALLS ASSESS-DOCD LE1/YR: CPT | Mod: CPTII,S$GLB,, | Performed by: ORTHOPAEDIC SURGERY

## 2023-10-11 PROCEDURE — 3008F BODY MASS INDEX DOCD: CPT | Mod: CPTII,S$GLB,, | Performed by: ORTHOPAEDIC SURGERY

## 2023-10-11 PROCEDURE — 1159F MED LIST DOCD IN RCRD: CPT | Mod: CPTII,S$GLB,, | Performed by: ORTHOPAEDIC SURGERY

## 2023-10-11 PROCEDURE — 1125F PR PAIN SEVERITY QUANTIFIED, PAIN PRESENT: ICD-10-PCS | Mod: CPTII,S$GLB,, | Performed by: ORTHOPAEDIC SURGERY

## 2023-10-11 PROCEDURE — 4010F ACE/ARB THERAPY RXD/TAKEN: CPT | Mod: CPTII,S$GLB,, | Performed by: ORTHOPAEDIC SURGERY

## 2023-10-11 RX ORDER — TRAMADOL HYDROCHLORIDE 50 MG/1
1 TABLET ORAL NIGHTLY
Status: ON HOLD | COMMUNITY
End: 2023-12-12 | Stop reason: HOSPADM

## 2023-10-11 NOTE — PROGRESS NOTES
Subjective:       Patient ID: Darshana Alicea is a 72 y.o. female.    Chief Complaint: Pain of the Neck (Cervical pain follow up. States she is doing much better. States the Tramadol has helped. Does not wish to go to Pain Managment)      History of Present Illness    Prior to meeting with the patient I reviewed the medical chart in [facility]. This included reviewing the previous progress notes from our office, review of the patient's last appointment with their primary care provider, review of any visits to the emergency room, and review of any pain management appointments or procedures.   Patient returns for follow-up evaluation of her neck pain she is happy to report that all symptoms have resolved.  Previous history of cervical fusion.    Current Medications  Current Outpatient Medications   Medication Sig Dispense Refill    amlodipine (NORVASC) 10 MG tablet Take 10 mg by mouth once daily.        amLODIPine-benazepriL (LOTREL) 5-40 mg per capsule Take 1 capsule by mouth once daily.      amLODIPine-benazepriL (LOTREL) 5-40 mg per capsule Take 1 capsule by mouth.      cholecalciferol, vitamin D3, 1,250 mcg (50,000 unit) capsule Take 50,000 Units by mouth.      ciclopirox (PENLAC) 8 % Soln Apply topically.      diclofenac sodium (VOLTAREN) 1 % Gel Voltaren 1 % topical gel   APPLY 2 GRAM TO THE AFFECTED AREA(S) BY TOPICAL ROUTE 4 TIMES PER DAY as needed      ergocalciferol (ERGOCALCIFEROL) 50,000 unit Cap Take 50,000 Units by mouth every 7 days.      hydroCHLOROthiazide (MICROZIDE) 12.5 mg capsule Take 12.5 mg by mouth.      levothyroxine (SYNTHROID) 88 MCG tablet Take 88 mcg by mouth once daily.        nitroGLYCERIN (NITROSTAT) 0.4 MG SL tablet Place 0.4 mg under the tongue every 5 (five) minutes as needed.        polyethylene glycol (MIRALAX) 17 gram/dose powder Take 17 g every day by oral route for 90 days.      pramipexole (MIRAPEX) 0.5 MG tablet Take 0.5 mg by mouth every evening.      rosuvastatin (CRESTOR) 20 MG tablet  Take 1 tablet by mouth once daily.      simvastatin (ZOCOR) 20 MG tablet Take 20 mg by mouth every evening.        traMADoL (ULTRAM) 50 mg tablet Take 1 tablet by mouth every evening.      traZODone (DESYREL) 50 MG tablet trazodone 50 mg tablet   one at night for up to three nights, then two at night for three night, then if needed increase to three at night.      furosemide (LASIX) 20 MG tablet Take 10 mg by mouth once daily.        oxyCODONE-acetaminophen (PERCOCET) 5-325 mg per tablet Take 1 tablet by mouth every 4 (four) hours as needed for Pain. 18 tablet 0     No current facility-administered medications for this visit.       Allergies  Review of patient's allergies indicates:   Allergen Reactions    Lyrica [pregabalin] Swelling    Sulfa (sulfonamide antibiotics) Swelling    Toradol [ketorolac] Swelling       Past Medical History  Past Medical History:   Diagnosis Date    Anemia     Colon polyp     Kidney stone     Kidney stones 10/29/2012    Thyroid disease        Surgical History  Past Surgical History:   Procedure Laterality Date    BLADDER SURGERY      CHOLECYSTECTOMY      CYSTOSCOPY      HYSTERECTOMY      OOPHORECTOMY      tonsillectomy   1964       Family History:   Family History   Problem Relation Age of Onset    Cancer Father     Cancer Paternal Grandfather     Heart disease Maternal Grandfather        Social History:   Social History     Socioeconomic History    Marital status:    Tobacco Use    Smoking status: Former     Current packs/day: 0.00     Types: Cigarettes     Start date: 1971     Quit date: 2001     Years since quittin.8   Substance and Sexual Activity    Alcohol use: No    Drug use: No       Hospitalization/Major Diagnostic Procedure:     Review of Systems     General/Constitutional:  Chills denies. Fatigue denies. Fever denies. Weight gain denies. Weight loss denies.    Respiratory:  Shortness of breath denies.    Cardiovascular:  Chest pain  denies.    Gastrointestinal:  Constipation denies. Diarrhea denies. Nausea denies. Vomiting denies.     Hematology:  Easy bruising denies. Prolonged bleeding denies.     Genitourinary:  Frequent urination denies. Pain in lower back denies. Painful urination denies.     Musculoskeletal:  See HPI for details    Skin:  Rash denies.    Neurologic:  Dizziness denies. Gait abnormalities denies. Seizures denies. Tingling/Numbess denies.    Psychiatric:  Anxiety denies. Depressed mood denies.     Objective:   Vital Signs: There were no vitals filed for this visit.     Physical Exam      General Examination:     Constitutional: The patient is alert and oriented to lace person and time. Mood is pleasant.     Head/Face: Normal facial features normal eyebrows    Eyes: Normal extraocular motion bilaterally    Lungs: Respirations are equal and unlabored    Gait is coordinated.    Cardiovascular: There are no swelling or varicosities present.    Lymphatic: Negative for adenopathy    Skin: Normal    Neurological: Level of consciousness normal. Oriented to place person and time and situation    Psychiatric: Oriented to time place person and situation    Well-healed anterior incision nontender no tenderness trapezius muscles no spasm mild restriction of extension and flexion without pain.  XRAY Report/ Interpretation:  None      Assessment:       1. History of fusion of cervical spine    2. Disc disease, degenerative, cervical    3. Cervical radiculopathy        Plan:       Darshana was seen today for pain.    Diagnoses and all orders for this visit:    History of fusion of cervical spine    Disc disease, degenerative, cervical    Cervical radiculopathy         Follow up if symptoms worsen or fail to improve.    Subjective symptoms have resolved.  I advised patient that she may have periodic exacerbations of neck pain in view of her previous history of a cervical fusion but at this time she should be activity as tolerated and return as  needed.  Treatment options were discussed with regards to the nature of the medical condition. Conservative pain intervention and surgical options were discussed in detail. The probability of success of each separate treatment option was discussed. The patient expressed a clear understanding of the treatment options. With regards to surgery, the procedure risk, benefits, complications, and outcomes were discussed. No guarantees were given with regards to surgical outcome.   The risk of complications, morbidity, and mortality of patient management decisions have been made at the time of this visit. These are associated with the patient's problems, diagnostic procedures and treatment options. This includes the possible management options selected and those considered but not selected by the patient after shared medical decision making we discussed with the patient.     This note was created using Dragon voice recognition software that occasionally misinterpreted phrases or words.

## 2023-11-09 NOTE — PROGRESS NOTES
[facility] Note    PCP: Ximena Lima NP    Chief Complaint: OAB    SUBJECTIVE:       History of Present Illness:  Darshana Alicea is a 73 y.o. female who presents to clinic for OAB. She is New  to our clinic.     She has a remote hx of stones.   She has seen Dr. Cárdenas in August 2022 for microscopic hematuria and underwent cysto/RGP which was normal per op note. CT scan also normal.   Was being considered for an Interstim. Did a 3 day voiding study but never heard back with results.   No gross hematuria.     She was on Detrol 2 mg BID. This stopped working and now she is on Vesicare 10 mg, just started this.     Today complaining of nocturia x 3-5. She does snore at night. Had a sleep study > 10 years ago which was reportedly normal.   During the day has some frequency but she is on a diuretic which she takes in the morning.    No incontinence.   She does have some urgency.     She does have issues with constipation. Started taking stool softener and now is having daily bowel movements.   Drinks 3 bottles of water and 1 coke.     Hx of stones treated with ESWL by Dr. Gomez.     UA today: negative for blood, leuks, nitrite   PVR today: 20 cc    Last urine culture: no documented UTIs    Family  hx: no  malignancy   Hx of CKD    Past medical, family, and social history reviewed as documented in chart with pertinent positive medical, family, and social history detailed in HPI.    Review of patient's allergies indicates:   Allergen Reactions    Lyrica [pregabalin] Swelling    Sulfa (sulfonamide antibiotics) Swelling    Toradol [ketorolac] Swelling       Past Medical History:   Diagnosis Date    Anemia     Colon polyp     Kidney stone     Kidney stones 10/29/2012    Thyroid disease      Past Surgical History:   Procedure Laterality Date    BLADDER SURGERY      CHOLECYSTECTOMY      CYSTOSCOPY      HYSTERECTOMY      OOPHORECTOMY      tonsillectomy   1964     Family History   Problem  "Relation Age of Onset    Cancer Father     Cancer Paternal Grandfather     Heart disease Maternal Grandfather      Social History     Tobacco Use    Smoking status: Former     Current packs/day: 0.00     Types: Cigarettes     Start date: 1971     Quit date: 2001     Years since quittin.9   Substance Use Topics    Alcohol use: No    Drug use: No        Review of Systems    OBJECTIVE:     Anticoagulation:  no    Estimated body mass index is 31.58 kg/m² as calculated from the following:    Height as of this encounter: 5' 4" (1.626 m).    Weight as of this encounter: 83.5 kg (184 lb).    Vital Signs (Most Recent)       Physical Exam  Vitals reviewed.   Constitutional:       General: She is not in acute distress.     Appearance: Normal appearance. She is not ill-appearing.   HENT:      Head: Normocephalic and atraumatic.   Eyes:      General: No scleral icterus.  Pulmonary:      Effort: Pulmonary effort is normal. No respiratory distress.   Abdominal:      Palpations: Abdomen is soft.   Skin:     Coloration: Skin is not jaundiced.   Neurological:      General: No focal deficit present.      Mental Status: She is alert and oriented to person, place, and time.   Psychiatric:         Mood and Affect: Mood normal.         Behavior: Behavior normal.       BMP  Lab Results   Component Value Date    BUN 17 2022     Imaging:  Per HPI    ASSESSMENT     1. Overactive bladder    2. Kidney stones      PLAN:     - Continue Vesicare 10 mg for now  - If does not work can consider beta 3 agonist  - Would like her to have a sleep study to r/o LORENZO prior to consideration for 3rd line treatments   - If meds are not effective and she does not have LORENZO then we can consider SNM  - She is not interested in botox  - Continue to manage constipation and have a soft BM daily   - Follow up with NP in 3 months       Gela Beard MD     "

## 2023-11-15 ENCOUNTER — OFFICE VISIT (OUTPATIENT)
Dept: UROLOGY | Facility: CLINIC | Age: 73
End: 2023-11-15
Payer: MEDICARE

## 2023-11-15 VITALS — HEIGHT: 64 IN | WEIGHT: 184 LBS | BODY MASS INDEX: 31.41 KG/M2

## 2023-11-15 DIAGNOSIS — N32.81 OVERACTIVE BLADDER: Primary | ICD-10-CM

## 2023-11-15 DIAGNOSIS — N20.0 KIDNEY STONES: ICD-10-CM

## 2023-11-15 PROCEDURE — 3008F BODY MASS INDEX DOCD: CPT | Mod: CPTII,S$GLB,, | Performed by: STUDENT IN AN ORGANIZED HEALTH CARE EDUCATION/TRAINING PROGRAM

## 2023-11-15 PROCEDURE — 4010F ACE/ARB THERAPY RXD/TAKEN: CPT | Mod: CPTII,S$GLB,, | Performed by: STUDENT IN AN ORGANIZED HEALTH CARE EDUCATION/TRAINING PROGRAM

## 2023-11-15 PROCEDURE — 3008F PR BODY MASS INDEX (BMI) DOCUMENTED: ICD-10-PCS | Mod: CPTII,S$GLB,, | Performed by: STUDENT IN AN ORGANIZED HEALTH CARE EDUCATION/TRAINING PROGRAM

## 2023-11-15 PROCEDURE — 3288F PR FALLS RISK ASSESSMENT DOCUMENTED: ICD-10-PCS | Mod: CPTII,S$GLB,, | Performed by: STUDENT IN AN ORGANIZED HEALTH CARE EDUCATION/TRAINING PROGRAM

## 2023-11-15 PROCEDURE — 4010F PR ACE/ARB THEARPY RXD/TAKEN: ICD-10-PCS | Mod: CPTII,S$GLB,, | Performed by: STUDENT IN AN ORGANIZED HEALTH CARE EDUCATION/TRAINING PROGRAM

## 2023-11-15 PROCEDURE — 3288F FALL RISK ASSESSMENT DOCD: CPT | Mod: CPTII,S$GLB,, | Performed by: STUDENT IN AN ORGANIZED HEALTH CARE EDUCATION/TRAINING PROGRAM

## 2023-11-15 PROCEDURE — 1159F MED LIST DOCD IN RCRD: CPT | Mod: CPTII,S$GLB,, | Performed by: STUDENT IN AN ORGANIZED HEALTH CARE EDUCATION/TRAINING PROGRAM

## 2023-11-15 PROCEDURE — 1159F PR MEDICATION LIST DOCUMENTED IN MEDICAL RECORD: ICD-10-PCS | Mod: CPTII,S$GLB,, | Performed by: STUDENT IN AN ORGANIZED HEALTH CARE EDUCATION/TRAINING PROGRAM

## 2023-11-15 PROCEDURE — 1160F PR REVIEW ALL MEDS BY PRESCRIBER/CLIN PHARMACIST DOCUMENTED: ICD-10-PCS | Mod: CPTII,S$GLB,, | Performed by: STUDENT IN AN ORGANIZED HEALTH CARE EDUCATION/TRAINING PROGRAM

## 2023-11-15 PROCEDURE — 99204 PR OFFICE/OUTPT VISIT, NEW, LEVL IV, 45-59 MIN: ICD-10-PCS | Mod: S$GLB,,, | Performed by: STUDENT IN AN ORGANIZED HEALTH CARE EDUCATION/TRAINING PROGRAM

## 2023-11-15 PROCEDURE — 99999 PR PBB SHADOW E&M-EST. PATIENT-LVL III: CPT | Mod: PBBFAC,,, | Performed by: STUDENT IN AN ORGANIZED HEALTH CARE EDUCATION/TRAINING PROGRAM

## 2023-11-15 PROCEDURE — 1126F PR PAIN SEVERITY QUANTIFIED, NO PAIN PRESENT: ICD-10-PCS | Mod: CPTII,S$GLB,, | Performed by: STUDENT IN AN ORGANIZED HEALTH CARE EDUCATION/TRAINING PROGRAM

## 2023-11-15 PROCEDURE — 1126F AMNT PAIN NOTED NONE PRSNT: CPT | Mod: CPTII,S$GLB,, | Performed by: STUDENT IN AN ORGANIZED HEALTH CARE EDUCATION/TRAINING PROGRAM

## 2023-11-15 PROCEDURE — 99999 PR PBB SHADOW E&M-EST. PATIENT-LVL III: ICD-10-PCS | Mod: PBBFAC,,, | Performed by: STUDENT IN AN ORGANIZED HEALTH CARE EDUCATION/TRAINING PROGRAM

## 2023-11-15 PROCEDURE — 1160F RVW MEDS BY RX/DR IN RCRD: CPT | Mod: CPTII,S$GLB,, | Performed by: STUDENT IN AN ORGANIZED HEALTH CARE EDUCATION/TRAINING PROGRAM

## 2023-11-15 PROCEDURE — 99204 OFFICE O/P NEW MOD 45 MIN: CPT | Mod: S$GLB,,, | Performed by: STUDENT IN AN ORGANIZED HEALTH CARE EDUCATION/TRAINING PROGRAM

## 2023-11-15 PROCEDURE — 1101F PR PT FALLS ASSESS DOC 0-1 FALLS W/OUT INJ PAST YR: ICD-10-PCS | Mod: CPTII,S$GLB,, | Performed by: STUDENT IN AN ORGANIZED HEALTH CARE EDUCATION/TRAINING PROGRAM

## 2023-11-15 PROCEDURE — 1101F PT FALLS ASSESS-DOCD LE1/YR: CPT | Mod: CPTII,S$GLB,, | Performed by: STUDENT IN AN ORGANIZED HEALTH CARE EDUCATION/TRAINING PROGRAM

## 2023-11-15 RX ORDER — SOLIFENACIN SUCCINATE 10 MG/1
10 TABLET, FILM COATED ORAL DAILY
Qty: 30 TABLET | Refills: 11 | Status: SHIPPED | OUTPATIENT
Start: 2023-11-15 | End: 2024-11-14

## 2023-11-29 ENCOUNTER — TELEPHONE (OUTPATIENT)
Dept: UROLOGY | Facility: CLINIC | Age: 73
End: 2023-11-29
Payer: MEDICARE

## 2023-11-29 NOTE — TELEPHONE ENCOUNTER
----- Message from Barbara Diana sent at 11/29/2023 10:21 AM CST -----  Regarding: orders  Contact: patient  Type: Needs Medical Advice  Who Called:  patient  Symptoms (please be specific):  sleep stdy  How long has patient had these symptoms:    Pharmacy name and phone #:    Best Call Back Number: [phone] (home)     Additional Information: Patient states no one has called her for her sleep study.  Please call patient to advise.  Thanks!

## 2023-11-29 NOTE — TELEPHONE ENCOUNTER
Returned call and spoke with patient, informed she is to contact her PCP to discuss, and they are the one to placed the referral for the sleep study, patient verbally understood.

## 2023-12-09 ENCOUNTER — OFFICE VISIT (OUTPATIENT)
Dept: URGENT CARE | Facility: CLINIC | Age: 73
End: 2023-12-09
Payer: MEDICARE

## 2023-12-09 ENCOUNTER — HOSPITAL ENCOUNTER (INPATIENT)
Facility: HOSPITAL | Age: 73
LOS: 2 days | Discharge: HOME | DRG: 392 | End: 2023-12-12
Attending: EMERGENCY MEDICINE | Admitting: STUDENT IN AN ORGANIZED HEALTH CARE EDUCATION/TRAINING PROGRAM
Payer: MEDICARE

## 2023-12-09 VITALS
BODY MASS INDEX: 31.41 KG/M2 | HEIGHT: 64 IN | DIASTOLIC BLOOD PRESSURE: 61 MMHG | WEIGHT: 184 LBS | TEMPERATURE: 98 F | OXYGEN SATURATION: 97 % | HEART RATE: 80 BPM | SYSTOLIC BLOOD PRESSURE: 100 MMHG | RESPIRATION RATE: 18 BRPM

## 2023-12-09 DIAGNOSIS — R07.9 CHEST PAIN: ICD-10-CM

## 2023-12-09 DIAGNOSIS — R19.7 NAUSEA VOMITING AND DIARRHEA: Primary | ICD-10-CM

## 2023-12-09 DIAGNOSIS — E86.0 DEHYDRATION: ICD-10-CM

## 2023-12-09 DIAGNOSIS — N17.9 AKI (ACUTE KIDNEY INJURY): ICD-10-CM

## 2023-12-09 DIAGNOSIS — R19.7 DIARRHEA, UNSPECIFIED TYPE: ICD-10-CM

## 2023-12-09 DIAGNOSIS — R11.2 NAUSEA VOMITING AND DIARRHEA: Primary | ICD-10-CM

## 2023-12-09 DIAGNOSIS — K52.9 GASTROENTERITIS: Primary | ICD-10-CM

## 2023-12-09 LAB
ALBUMIN SERPL BCP-MCNC: 4.5 G/DL (ref 3.5–5.2)
ALP SERPL-CCNC: 32 U/L (ref 55–135)
ALT SERPL W/O P-5'-P-CCNC: 32 U/L (ref 10–44)
ANION GAP SERPL CALC-SCNC: 16 MMOL/L (ref 8–16)
AST SERPL-CCNC: 30 U/L (ref 10–40)
BACTERIA #/AREA URNS HPF: NEGATIVE /HPF
BASOPHILS # BLD AUTO: 0.02 K/UL (ref 0–0.2)
BASOPHILS NFR BLD: 0.2 % (ref 0–1.9)
BILIRUB SERPL-MCNC: 0.6 MG/DL (ref 0.1–1)
BILIRUB UR QL STRIP: ABNORMAL
BUN SERPL-MCNC: 80 MG/DL (ref 8–23)
CALCIUM SERPL-MCNC: 8.1 MG/DL (ref 8.7–10.5)
CHLORIDE SERPL-SCNC: 104 MMOL/L (ref 95–110)
CLARITY UR: ABNORMAL
CO2 SERPL-SCNC: 15 MMOL/L (ref 23–29)
COLOR UR: YELLOW
CREAT SERPL-MCNC: 3.6 MG/DL (ref 0.5–1.4)
CTP QC/QA: YES
CTP QC/QA: YES
DIFFERENTIAL METHOD BLD: ABNORMAL
EOSINOPHIL # BLD AUTO: 0 K/UL (ref 0–0.5)
EOSINOPHIL NFR BLD: 0 % (ref 0–8)
ERYTHROCYTE [DISTWIDTH] IN BLOOD BY AUTOMATED COUNT: 14.2 % (ref 11.5–14.5)
EST. GFR  (NO RACE VARIABLE): 12.9 ML/MIN/1.73 M^2
FLUAV AG NPH QL: NEGATIVE
FLUBV AG NPH QL: NEGATIVE
GLUCOSE SERPL-MCNC: 96 MG/DL (ref 70–110)
GLUCOSE UR QL STRIP: NEGATIVE
HCT VFR BLD AUTO: 37.5 % (ref 37–48.5)
HGB BLD-MCNC: 13 G/DL (ref 12–16)
HGB UR QL STRIP: ABNORMAL
HYALINE CASTS #/AREA URNS LPF: 92 /LPF
IMM GRANULOCYTES # BLD AUTO: 0.03 K/UL (ref 0–0.04)
IMM GRANULOCYTES NFR BLD AUTO: 0.3 % (ref 0–0.5)
KETONES UR QL STRIP: NEGATIVE
LEUKOCYTE ESTERASE UR QL STRIP: NEGATIVE
LIPASE SERPL-CCNC: 21 U/L (ref 4–60)
LYMPHOCYTES # BLD AUTO: 1.1 K/UL (ref 1–4.8)
LYMPHOCYTES NFR BLD: 10.6 % (ref 18–48)
MCH RBC QN AUTO: 30.2 PG (ref 27–31)
MCHC RBC AUTO-ENTMCNC: 34.7 G/DL (ref 32–36)
MCV RBC AUTO: 87 FL (ref 82–98)
MICROSCOPIC COMMENT: ABNORMAL
MONOCYTES # BLD AUTO: 1.3 K/UL (ref 0.3–1)
MONOCYTES NFR BLD: 12 % (ref 4–15)
NEUTROPHILS # BLD AUTO: 8.1 K/UL (ref 1.8–7.7)
NEUTROPHILS NFR BLD: 76.9 % (ref 38–73)
NITRITE UR QL STRIP: NEGATIVE
NRBC BLD-RTO: 0 /100 WBC
PH UR STRIP: 6 [PH] (ref 5–8)
PLATELET # BLD AUTO: 192 K/UL (ref 150–450)
PMV BLD AUTO: 10.4 FL (ref 9.2–12.9)
POTASSIUM SERPL-SCNC: 2.8 MMOL/L (ref 3.5–5.1)
PROT SERPL-MCNC: 7.5 G/DL (ref 6–8.4)
PROT UR QL STRIP: ABNORMAL
RBC # BLD AUTO: 4.3 M/UL (ref 4–5.4)
RBC #/AREA URNS HPF: 5 /HPF (ref 0–4)
SARS-COV-2 AG RESP QL IA.RAPID: NEGATIVE
SODIUM SERPL-SCNC: 135 MMOL/L (ref 136–145)
SP GR UR STRIP: 1.02 (ref 1–1.03)
SQUAMOUS #/AREA URNS HPF: 7 /HPF
URN SPEC COLLECT METH UR: ABNORMAL
UROBILINOGEN UR STRIP-ACNC: NEGATIVE EU/DL
WBC # BLD AUTO: 10.58 K/UL (ref 3.9–12.7)
WBC #/AREA URNS HPF: 12 /HPF (ref 0–5)

## 2023-12-09 PROCEDURE — 96361 HYDRATE IV INFUSION ADD-ON: CPT

## 2023-12-09 PROCEDURE — 87086 URINE CULTURE/COLONY COUNT: CPT

## 2023-12-09 PROCEDURE — 80053 COMPREHEN METABOLIC PANEL: CPT

## 2023-12-09 PROCEDURE — 87804 INFLUENZA ASSAY W/OPTIC: CPT | Mod: 59,QW,, | Performed by: NURSE PRACTITIONER

## 2023-12-09 PROCEDURE — 96372 PR INJECTION,THERAP/PROPH/DIAG2ST, IM OR SUBCUT: ICD-10-PCS | Mod: S$GLB,,, | Performed by: NURSE PRACTITIONER

## 2023-12-09 PROCEDURE — 99285 EMERGENCY DEPT VISIT HI MDM: CPT | Mod: 25

## 2023-12-09 PROCEDURE — 87811 SARS-COV-2 COVID19 W/OPTIC: CPT | Mod: QW,S$GLB,, | Performed by: NURSE PRACTITIONER

## 2023-12-09 PROCEDURE — 99213 PR OFFICE/OUTPT VISIT, EST, LEVL III, 20-29 MIN: ICD-10-PCS | Mod: 25,S$GLB,, | Performed by: NURSE PRACTITIONER

## 2023-12-09 PROCEDURE — 87633 RESP VIRUS 12-25 TARGETS: CPT

## 2023-12-09 PROCEDURE — 85025 COMPLETE CBC W/AUTO DIFF WBC: CPT

## 2023-12-09 PROCEDURE — 81001 URINALYSIS AUTO W/SCOPE: CPT

## 2023-12-09 PROCEDURE — 96365 THER/PROPH/DIAG IV INF INIT: CPT

## 2023-12-09 PROCEDURE — 87811 SARS CORONAVIRUS 2 ANTIGEN POCT, MANUAL READ: ICD-10-PCS | Mod: QW,S$GLB,, | Performed by: NURSE PRACTITIONER

## 2023-12-09 PROCEDURE — 83690 ASSAY OF LIPASE: CPT

## 2023-12-09 PROCEDURE — 25000003 PHARM REV CODE 250: Performed by: EMERGENCY MEDICINE

## 2023-12-09 PROCEDURE — 96372 THER/PROPH/DIAG INJ SC/IM: CPT | Mod: S$GLB,,, | Performed by: NURSE PRACTITIONER

## 2023-12-09 PROCEDURE — 96375 TX/PRO/DX INJ NEW DRUG ADDON: CPT

## 2023-12-09 PROCEDURE — 87804 POCT INFLUENZA A/B: ICD-10-PCS | Mod: 59,QW,, | Performed by: NURSE PRACTITIONER

## 2023-12-09 PROCEDURE — 63600175 PHARM REV CODE 636 W HCPCS: Performed by: EMERGENCY MEDICINE

## 2023-12-09 PROCEDURE — 99213 OFFICE O/P EST LOW 20 MIN: CPT | Mod: 25,S$GLB,, | Performed by: NURSE PRACTITIONER

## 2023-12-09 RX ORDER — CIPROFLOXACIN 500 MG/1
500 TABLET ORAL 2 TIMES DAILY
Qty: 14 TABLET | Refills: 0 | Status: SHIPPED | OUTPATIENT
Start: 2023-12-09 | End: 2023-12-16

## 2023-12-09 RX ORDER — PROMETHAZINE HYDROCHLORIDE 25 MG/1
25 TABLET ORAL EVERY 6 HOURS PRN
Qty: 20 TABLET | Refills: 0 | Status: SHIPPED | OUTPATIENT
Start: 2023-12-09

## 2023-12-09 RX ORDER — PROMETHAZINE HYDROCHLORIDE 25 MG/ML
25 INJECTION, SOLUTION INTRAMUSCULAR; INTRAVENOUS
Status: DISCONTINUED | OUTPATIENT
Start: 2023-12-09 | End: 2023-12-09 | Stop reason: HOSPADM

## 2023-12-09 RX ORDER — POTASSIUM CHLORIDE 7.45 MG/ML
10 INJECTION INTRAVENOUS ONCE
Status: COMPLETED | OUTPATIENT
Start: 2023-12-09 | End: 2023-12-10

## 2023-12-09 RX ORDER — ONDANSETRON 2 MG/ML
4 INJECTION INTRAMUSCULAR; INTRAVENOUS
Status: COMPLETED | OUTPATIENT
Start: 2023-12-09 | End: 2023-12-09

## 2023-12-09 RX ORDER — ONDANSETRON 4 MG/1
4 TABLET, ORALLY DISINTEGRATING ORAL EVERY 6 HOURS PRN
Qty: 20 TABLET | Refills: 0 | Status: SHIPPED | OUTPATIENT
Start: 2023-12-09

## 2023-12-09 RX ORDER — METRONIDAZOLE 500 MG/1
500 TABLET ORAL 3 TIMES DAILY
Qty: 21 TABLET | Refills: 0 | Status: SHIPPED | OUTPATIENT
Start: 2023-12-09 | End: 2023-12-16

## 2023-12-09 RX ORDER — SODIUM CHLORIDE 9 MG/ML
1000 INJECTION, SOLUTION INTRAVENOUS
Status: COMPLETED | OUTPATIENT
Start: 2023-12-09 | End: 2023-12-10

## 2023-12-09 RX ADMIN — POTASSIUM CHLORIDE 10 MEQ: 10 INJECTION, SOLUTION INTRAVENOUS at 11:12

## 2023-12-09 RX ADMIN — ONDANSETRON 4 MG: 2 INJECTION INTRAMUSCULAR; INTRAVENOUS at 09:12

## 2023-12-09 RX ADMIN — PROMETHAZINE HYDROCHLORIDE 25 MG: 25 INJECTION, SOLUTION INTRAMUSCULAR; INTRAVENOUS at 12:12

## 2023-12-09 RX ADMIN — SODIUM CHLORIDE 1000 ML: 0.9 INJECTION, SOLUTION INTRAVENOUS at 09:12

## 2023-12-09 NOTE — PROGRESS NOTES
"Subjective:      Patient ID: Darshana Alicea is a 73 y.o. female.    Vitals:  height is 5' 4" (1.626 m) and weight is 83.5 kg (184 lb). Her oral temperature is 97.7 °F (36.5 °C). Her blood pressure is 100/61 and her pulse is 80. Her respiration is 18 and oxygen saturation is 97%.     Chief Complaint: Emesis and Diarrhea    Patient presents accompanied by her  complaints of nausea vomiting and diarrhea which has been ongoing for 4 days.  No dark or bloody stool noted; no bloody emesis.  She has been unable to tolerate anything by mouth.  Accompanied by left lower quadrant pain.  Last colonoscopy 9 years ago.        Constitution: Positive for fatigue. Negative for chills and fever.   HENT:  Negative for congestion, sinus pressure and sore throat.    Neck: Negative for neck pain, neck stiffness and painful lymph nodes.   Cardiovascular:  Negative for chest pain and sob on exertion.   Eyes:  Negative for eye itching, eye pain and blurred vision.   Respiratory:  Negative for cough and shortness of breath.    Gastrointestinal:  Positive for abdominal pain, nausea, vomiting and diarrhea. Negative for bright red blood in stool, dark colored stools, rectal bleeding and bowel incontinence.   Endocrine: cold intolerance and heat intolerance.   Genitourinary:  Negative for urgency and flank pain.   Musculoskeletal:  Negative for joint pain and gout.   Skin:  Negative for wound and lesion.   Allergic/Immunologic: Negative for immunocompromised state and itching.   Neurological:  Negative for altered mental status and numbness.   Hematologic/Lymphatic: Negative for swollen lymph nodes and trouble clotting.   Psychiatric/Behavioral:  Negative for altered mental status and confusion.       Objective:     Physical Exam   Constitutional: She is oriented to person, place, and time. She appears well-developed. She is cooperative. She does not appear ill. No distress.   HENT:   Head: Normocephalic and atraumatic.   Ears:   Right Ear: " Hearing, tympanic membrane, external ear and ear canal normal.   Left Ear: Hearing, tympanic membrane, external ear and ear canal normal.   Nose: Nose normal. No mucosal edema, rhinorrhea or nasal deformity. No epistaxis. Right sinus exhibits no maxillary sinus tenderness and no frontal sinus tenderness. Left sinus exhibits no maxillary sinus tenderness and no frontal sinus tenderness.   Mouth/Throat: Uvula is midline, oropharynx is clear and moist and mucous membranes are normal. No trismus in the jaw. Normal dentition. No uvula swelling. No posterior oropharyngeal erythema.   Eyes: Conjunctivae and lids are normal. Right eye exhibits no discharge. Left eye exhibits no discharge.   Neck: Trachea normal and phonation normal. Neck supple.   Cardiovascular: Normal rate, regular rhythm, normal heart sounds and normal pulses.   Pulmonary/Chest: Effort normal and breath sounds normal. No respiratory distress.   Abdominal: Normal appearance. She exhibits no distension and no pulsatile midline mass. Soft. Bowel sounds are increased. There is abdominal tenderness (Left lower quadrant). There is no guarding.   Musculoskeletal: Normal range of motion.         General: No deformity. Normal range of motion.   Neurological: no focal deficit. She is alert and oriented to person, place, and time. She exhibits normal muscle tone. Coordination normal.   Skin: Skin is warm, dry, intact and not pale. Capillary refill takes 2 to 3 seconds.   Psychiatric: Her speech is normal and behavior is normal. Mood, judgment and thought content normal.   Nursing note and vitals reviewed.      Assessment:     1. Gastroenteritis    2. Diarrhea, unspecified type        Plan:       Gastroenteritis    Diarrhea, unspecified type  -     POCT Influenza A/B Rapid Antigen  -     SARS Coronavirus 2 Antigen, POCT Manual Read    Other orders  -     promethazine injection 25 mg  -     ciprofloxacin HCl (CIPRO) 500 MG tablet; Take 1 tablet (500 mg total) by  mouth 2 (two) times daily. for 7 days  Dispense: 14 tablet; Refill: 0  -     metroNIDAZOLE (FLAGYL) 500 MG tablet; Take 1 tablet (500 mg total) by mouth 3 (three) times daily. for 7 days  Dispense: 21 tablet; Refill: 0  -     promethazine (PHENERGAN) 25 MG tablet; Take 1 tablet (25 mg total) by mouth every 6 (six) hours as needed for Nausea.  Dispense: 20 tablet; Refill: 0  -     ondansetron (ZOFRAN-ODT) 4 MG TbDL; Take 1 tablet (4 mg total) by mouth every 6 (six) hours as needed (N/V).  Dispense: 20 tablet; Refill: 0      Patient unable to tolerate anything by mouth at this time, even fluids.  We will give her an IM injection of promethazine here in the clinic.  I have advised her to get Gatorade to sip on at home.  If she is unable to tolerate fluids by mouth, she should proceed to the emergency department.  Location of pain suspicious for diverticulitis.  I have sent in Cipro and Flagyl and discuss these medications with her.  She should follow-up with her PCP.

## 2023-12-09 NOTE — PROGRESS NOTES
"Subjective:      Patient ID: Darshana Alicea is a 73 y.o. female.    Vitals:  height is 5' 4" (1.626 m) and weight is 83.5 kg (184 lb). Her oral temperature is 97.7 °F (36.5 °C). Her blood pressure is 100/61 and her pulse is 80. Her respiration is 18 and oxygen saturation is 97%.     Chief Complaint: Emesis and Diarrhea    Diarrhea   This is a new problem. The current episode started in the past 7 days (3 DAYS). The problem occurs more than 10 times per day. The problem has been gradually worsening. The stool consistency is described as Watery. The patient states that diarrhea awakens her from sleep. Associated symptoms include headaches. Pertinent negatives include no vomiting. Nothing aggravates the symptoms. There are no known risk factors. She has tried nothing for the symptoms. The treatment provided no relief.     Gastrointestinal:  Positive for diarrhea. Negative for vomiting.   Neurological:  Positive for headaches.    Objective:     Physical Exam    Assessment:     No diagnosis found.    Plan:       There are no diagnoses linked to this encounter.                "

## 2023-12-10 PROBLEM — E87.8 ELECTROLYTE ABNORMALITY: Status: ACTIVE | Noted: 2023-12-10

## 2023-12-10 PROBLEM — N17.9 AKI (ACUTE KIDNEY INJURY): Status: ACTIVE | Noted: 2023-12-10

## 2023-12-10 PROBLEM — R19.7 DIARRHEA: Status: ACTIVE | Noted: 2023-12-10

## 2023-12-10 PROBLEM — R07.9 CHEST PAIN: Status: ACTIVE | Noted: 2023-12-10

## 2023-12-10 PROBLEM — E03.9 HYPOTHYROIDISM: Status: ACTIVE | Noted: 2023-12-10

## 2023-12-10 LAB
ADENOVIRUS: NOT DETECTED
ALBUMIN SERPL BCP-MCNC: 3.7 G/DL (ref 3.5–5.2)
ALP SERPL-CCNC: 26 U/L (ref 55–135)
ALT SERPL W/O P-5'-P-CCNC: 26 U/L (ref 10–44)
ANION GAP SERPL CALC-SCNC: 9 MMOL/L (ref 8–16)
AST SERPL-CCNC: 25 U/L (ref 10–40)
BASOPHILS # BLD AUTO: 0.02 K/UL (ref 0–0.2)
BASOPHILS NFR BLD: 0.3 % (ref 0–1.9)
BILIRUB SERPL-MCNC: 0.5 MG/DL (ref 0.1–1)
BORDETELLA PARAPERTUSSIS (IS1001): NOT DETECTED
BORDETELLA PERTUSSIS (PTXP): NOT DETECTED
BUN SERPL-MCNC: 61 MG/DL (ref 8–23)
C DIFF GDH STL QL: NEGATIVE
C DIFF TOX A+B STL QL IA: NEGATIVE
CALCIUM SERPL-MCNC: 7.3 MG/DL (ref 8.7–10.5)
CHLAMYDIA PNEUMONIAE: NOT DETECTED
CHLORIDE SERPL-SCNC: 113 MMOL/L (ref 95–110)
CO2 SERPL-SCNC: 19 MMOL/L (ref 23–29)
CORONAVIRUS 229E, COMMON COLD VIRUS: NOT DETECTED
CORONAVIRUS HKU1, COMMON COLD VIRUS: NOT DETECTED
CORONAVIRUS NL63, COMMON COLD VIRUS: NOT DETECTED
CORONAVIRUS OC43, COMMON COLD VIRUS: NOT DETECTED
CREAT SERPL-MCNC: 2 MG/DL (ref 0.5–1.4)
DIFFERENTIAL METHOD BLD: ABNORMAL
EOSINOPHIL # BLD AUTO: 0 K/UL (ref 0–0.5)
EOSINOPHIL NFR BLD: 0.7 % (ref 0–8)
ERYTHROCYTE [DISTWIDTH] IN BLOOD BY AUTOMATED COUNT: 14.3 % (ref 11.5–14.5)
EST. GFR  (NO RACE VARIABLE): 26.1 ML/MIN/1.73 M^2
ESTIMATED AVG GLUCOSE: 126 MG/DL (ref 68–131)
FLUBV RNA NPH QL NAA+NON-PROBE: NOT DETECTED
GLUCOSE SERPL-MCNC: 87 MG/DL (ref 70–110)
HBA1C MFR BLD: 6 % (ref 4.5–6.2)
HCT VFR BLD AUTO: 32.9 % (ref 37–48.5)
HGB BLD-MCNC: 11.3 G/DL (ref 12–16)
HPIV1 RNA NPH QL NAA+NON-PROBE: NOT DETECTED
HPIV2 RNA NPH QL NAA+NON-PROBE: NOT DETECTED
HPIV3 RNA NPH QL NAA+NON-PROBE: NOT DETECTED
HPIV4 RNA NPH QL NAA+NON-PROBE: NOT DETECTED
HUMAN METAPNEUMOVIRUS: NOT DETECTED
IMM GRANULOCYTES # BLD AUTO: 0.01 K/UL (ref 0–0.04)
IMM GRANULOCYTES NFR BLD AUTO: 0.2 % (ref 0–0.5)
INFLUENZA A (SUBTYPES H1,H1-2009,H3): NOT DETECTED
LYMPHOCYTES # BLD AUTO: 1.4 K/UL (ref 1–4.8)
LYMPHOCYTES NFR BLD: 24.1 % (ref 18–48)
MCH RBC QN AUTO: 30.5 PG (ref 27–31)
MCHC RBC AUTO-ENTMCNC: 34.3 G/DL (ref 32–36)
MCV RBC AUTO: 89 FL (ref 82–98)
MONOCYTES # BLD AUTO: 0.8 K/UL (ref 0.3–1)
MONOCYTES NFR BLD: 13.3 % (ref 4–15)
MYCOPLASMA PNEUMONIAE: NOT DETECTED
NEUTROPHILS # BLD AUTO: 3.5 K/UL (ref 1.8–7.7)
NEUTROPHILS NFR BLD: 61.4 % (ref 38–73)
NRBC BLD-RTO: 0 /100 WBC
PLATELET # BLD AUTO: 168 K/UL (ref 150–450)
PMV BLD AUTO: 10.1 FL (ref 9.2–12.9)
POTASSIUM SERPL-SCNC: 3.6 MMOL/L (ref 3.5–5.1)
PROT SERPL-MCNC: 6.4 G/DL (ref 6–8.4)
RBC # BLD AUTO: 3.7 M/UL (ref 4–5.4)
RESPIRATORY INFECTION PANEL SOURCE: NORMAL
RSV RNA NPH QL NAA+NON-PROBE: NOT DETECTED
RV+EV RNA NPH QL NAA+NON-PROBE: NOT DETECTED
SARS-COV-2 RNA RESP QL NAA+PROBE: NOT DETECTED
SODIUM SERPL-SCNC: 141 MMOL/L (ref 136–145)
TROPONIN I SERPL HS-MCNC: 12.6 PG/ML (ref 0–14.9)
WBC # BLD AUTO: 5.73 K/UL (ref 3.9–12.7)
WBC #/AREA STL HPF: NORMAL /[HPF]

## 2023-12-10 PROCEDURE — 87449 NOS EACH ORGANISM AG IA: CPT | Performed by: STUDENT IN AN ORGANIZED HEALTH CARE EDUCATION/TRAINING PROGRAM

## 2023-12-10 PROCEDURE — 94760 N-INVAS EAR/PLS OXIMETRY 1: CPT

## 2023-12-10 PROCEDURE — 36415 COLL VENOUS BLD VENIPUNCTURE: CPT | Performed by: STUDENT IN AN ORGANIZED HEALTH CARE EDUCATION/TRAINING PROGRAM

## 2023-12-10 PROCEDURE — 25000003 PHARM REV CODE 250: Performed by: EMERGENCY MEDICINE

## 2023-12-10 PROCEDURE — 80053 COMPREHEN METABOLIC PANEL: CPT | Performed by: STUDENT IN AN ORGANIZED HEALTH CARE EDUCATION/TRAINING PROGRAM

## 2023-12-10 PROCEDURE — 87046 STOOL CULTR AEROBIC BACT EA: CPT | Mod: 59 | Performed by: STUDENT IN AN ORGANIZED HEALTH CARE EDUCATION/TRAINING PROGRAM

## 2023-12-10 PROCEDURE — 12000002 HC ACUTE/MED SURGE SEMI-PRIVATE ROOM

## 2023-12-10 PROCEDURE — 25000003 PHARM REV CODE 250: Performed by: STUDENT IN AN ORGANIZED HEALTH CARE EDUCATION/TRAINING PROGRAM

## 2023-12-10 PROCEDURE — 85025 COMPLETE CBC W/AUTO DIFF WBC: CPT | Performed by: STUDENT IN AN ORGANIZED HEALTH CARE EDUCATION/TRAINING PROGRAM

## 2023-12-10 PROCEDURE — 99900035 HC TECH TIME PER 15 MIN (STAT)

## 2023-12-10 PROCEDURE — 87449 NOS EACH ORGANISM AG IA: CPT | Mod: 91 | Performed by: STUDENT IN AN ORGANIZED HEALTH CARE EDUCATION/TRAINING PROGRAM

## 2023-12-10 PROCEDURE — 63600175 PHARM REV CODE 636 W HCPCS: Performed by: STUDENT IN AN ORGANIZED HEALTH CARE EDUCATION/TRAINING PROGRAM

## 2023-12-10 PROCEDURE — 93010 ELECTROCARDIOGRAM REPORT: CPT | Mod: ,,, | Performed by: GENERAL PRACTICE

## 2023-12-10 PROCEDURE — 84484 ASSAY OF TROPONIN QUANT: CPT | Performed by: STUDENT IN AN ORGANIZED HEALTH CARE EDUCATION/TRAINING PROGRAM

## 2023-12-10 PROCEDURE — 94799 UNLISTED PULMONARY SVC/PX: CPT

## 2023-12-10 PROCEDURE — 93005 ELECTROCARDIOGRAM TRACING: CPT | Performed by: GENERAL PRACTICE

## 2023-12-10 PROCEDURE — 99900031 HC PATIENT EDUCATION (STAT)

## 2023-12-10 PROCEDURE — 89055 LEUKOCYTE ASSESSMENT FECAL: CPT | Performed by: STUDENT IN AN ORGANIZED HEALTH CARE EDUCATION/TRAINING PROGRAM

## 2023-12-10 PROCEDURE — 87045 FECES CULTURE AEROBIC BACT: CPT | Performed by: STUDENT IN AN ORGANIZED HEALTH CARE EDUCATION/TRAINING PROGRAM

## 2023-12-10 PROCEDURE — 83036 HEMOGLOBIN GLYCOSYLATED A1C: CPT | Performed by: STUDENT IN AN ORGANIZED HEALTH CARE EDUCATION/TRAINING PROGRAM

## 2023-12-10 PROCEDURE — 87209 SMEAR COMPLEX STAIN: CPT | Performed by: STUDENT IN AN ORGANIZED HEALTH CARE EDUCATION/TRAINING PROGRAM

## 2023-12-10 RX ORDER — LEVOTHYROXINE SODIUM 25 UG/1
75 TABLET ORAL
Status: DISCONTINUED | OUTPATIENT
Start: 2023-12-10 | End: 2023-12-12 | Stop reason: HOSPADM

## 2023-12-10 RX ORDER — SODIUM CHLORIDE 9 MG/ML
INJECTION, SOLUTION INTRAVENOUS CONTINUOUS
Status: DISCONTINUED | OUTPATIENT
Start: 2023-12-10 | End: 2023-12-12 | Stop reason: HOSPADM

## 2023-12-10 RX ORDER — METRONIDAZOLE 500 MG/100ML
500 INJECTION, SOLUTION INTRAVENOUS
Status: DISCONTINUED | OUTPATIENT
Start: 2023-12-10 | End: 2023-12-12 | Stop reason: HOSPADM

## 2023-12-10 RX ORDER — PROCHLORPERAZINE EDISYLATE 5 MG/ML
2.5 INJECTION INTRAMUSCULAR; INTRAVENOUS EVERY 6 HOURS PRN
Status: DISCONTINUED | OUTPATIENT
Start: 2023-12-10 | End: 2023-12-10

## 2023-12-10 RX ORDER — ATORVASTATIN CALCIUM 40 MG/1
40 TABLET, FILM COATED ORAL DAILY
Status: DISCONTINUED | OUTPATIENT
Start: 2023-12-10 | End: 2023-12-12 | Stop reason: HOSPADM

## 2023-12-10 RX ORDER — ENOXAPARIN SODIUM 100 MG/ML
40 INJECTION SUBCUTANEOUS EVERY 24 HOURS
Status: DISCONTINUED | OUTPATIENT
Start: 2023-12-10 | End: 2023-12-10

## 2023-12-10 RX ORDER — NITROGLYCERIN 0.4 MG/1
0.4 TABLET SUBLINGUAL EVERY 5 MIN PRN
Status: DISCONTINUED | OUTPATIENT
Start: 2023-12-10 | End: 2023-12-12 | Stop reason: HOSPADM

## 2023-12-10 RX ORDER — ENOXAPARIN SODIUM 100 MG/ML
30 INJECTION SUBCUTANEOUS EVERY 24 HOURS
Status: DISCONTINUED | OUTPATIENT
Start: 2023-12-10 | End: 2023-12-12

## 2023-12-10 RX ORDER — SODIUM CHLORIDE 0.9 % (FLUSH) 0.9 %
10 SYRINGE (ML) INJECTION
Status: DISCONTINUED | OUTPATIENT
Start: 2023-12-10 | End: 2023-12-12 | Stop reason: HOSPADM

## 2023-12-10 RX ORDER — TALC
6 POWDER (GRAM) TOPICAL NIGHTLY PRN
Status: DISCONTINUED | OUTPATIENT
Start: 2023-12-10 | End: 2023-12-12 | Stop reason: HOSPADM

## 2023-12-10 RX ORDER — PROMETHAZINE HYDROCHLORIDE 12.5 MG/1
12.5 TABLET ORAL EVERY 6 HOURS PRN
Status: DISCONTINUED | OUTPATIENT
Start: 2023-12-10 | End: 2023-12-12 | Stop reason: HOSPADM

## 2023-12-10 RX ORDER — ONDANSETRON 2 MG/ML
4 INJECTION INTRAMUSCULAR; INTRAVENOUS EVERY 4 HOURS PRN
Status: DISCONTINUED | OUTPATIENT
Start: 2023-12-10 | End: 2023-12-12 | Stop reason: HOSPADM

## 2023-12-10 RX ADMIN — SODIUM CHLORIDE: 0.9 INJECTION, SOLUTION INTRAVENOUS at 09:12

## 2023-12-10 RX ADMIN — METRONIDAZOLE 500 MG: 5 INJECTION, SOLUTION INTRAVENOUS at 04:12

## 2023-12-10 RX ADMIN — METRONIDAZOLE 500 MG: 5 INJECTION, SOLUTION INTRAVENOUS at 09:12

## 2023-12-10 RX ADMIN — CEFTRIAXONE SODIUM 1 G: 1 INJECTION, POWDER, FOR SOLUTION INTRAMUSCULAR; INTRAVENOUS at 01:12

## 2023-12-10 RX ADMIN — ENOXAPARIN SODIUM 30 MG: 30 INJECTION SUBCUTANEOUS at 04:12

## 2023-12-10 RX ADMIN — METRONIDAZOLE 500 MG: 5 INJECTION, SOLUTION INTRAVENOUS at 02:12

## 2023-12-10 RX ADMIN — SODIUM CHLORIDE: 0.9 INJECTION, SOLUTION INTRAVENOUS at 02:12

## 2023-12-10 RX ADMIN — PROCHLORPERAZINE EDISYLATE 2.5 MG: 5 INJECTION INTRAMUSCULAR; INTRAVENOUS at 11:12

## 2023-12-10 RX ADMIN — PROCHLORPERAZINE EDISYLATE 2.5 MG: 5 INJECTION INTRAMUSCULAR; INTRAVENOUS at 02:12

## 2023-12-10 RX ADMIN — ATORVASTATIN CALCIUM 40 MG: 40 TABLET, FILM COATED ORAL at 10:12

## 2023-12-10 RX ADMIN — SODIUM CHLORIDE 1000 ML: 0.9 INJECTION, SOLUTION INTRAVENOUS at 12:12

## 2023-12-10 RX ADMIN — ONDANSETRON 4 MG: 2 INJECTION INTRAMUSCULAR; INTRAVENOUS at 04:12

## 2023-12-10 NOTE — ASSESSMENT & PLAN NOTE
Likely viral- colitis/enteritis- sent stool studies  C/w IVF and Rocephin/Flagyl   Anti-emetics prn    Advance diet as tolerated

## 2023-12-10 NOTE — CARE UPDATE
12/10/23 1019   Patient Assessment/Suction   Level of Consciousness (AVPU) alert   Respiratory Effort Normal   Expansion/Accessory Muscles/Retractions no use of accessory muscles;no retractions;expansion symmetric   All Lung Fields Breath Sounds clear   Rhythm/Pattern, Respiratory unlabored;depth regular;pattern regular   Cough Frequency no cough   PRE-TX-O2   Device (Oxygen Therapy) room air   SpO2 99 %   Pulse Oximetry Type Intermittent   $ Pulse Oximetry - Single Charge Pulse Oximetry - Single   Pulse 68   Resp 16   Education   $ Education 15 min;Other (see comment)  (SATS)

## 2023-12-10 NOTE — NURSING
Received patient from ED via wheelchair. AAO X 4 in no apparent distress. Admission and assessment completed. Skin assessed. See 4eyes documentation.        Nurses Note -- 4 Eyes      12/10/2023   2:13 AM      Skin assessed during: Admit      [x] No Altered Skin Integrity Present    []Prevention Measures Documented      [] Yes- Altered Skin Integrity Present or Discovered   [] LDA Added if Not in Epic (Describe Wound)   [] New Altered Skin Integrity was Present on Admit and Documented in LDA   [] Wound Image Taken    Wound Care Consulted? No    Attending Nurse:  [id number]    Second RN/Staff Member:  [id number]

## 2023-12-10 NOTE — SUBJECTIVE & OBJECTIVE
Past Medical History:   Diagnosis Date    Anemia     Colon polyp     Kidney stone     Kidney stones 10/29/2012    Thyroid disease        Past Surgical History:   Procedure Laterality Date    BLADDER SURGERY      CHOLECYSTECTOMY      CYSTOSCOPY      HYSTERECTOMY      OOPHORECTOMY      tonsillectomy   1964       Review of patient's allergies indicates:   Allergen Reactions    Lyrica [pregabalin] Swelling    Sulfa (sulfonamide antibiotics) Swelling    Toradol [ketorolac] Swelling       Current Facility-Administered Medications on File Prior to Encounter   Medication    [COMPLETED] promethazine injection 25 mg     Current Outpatient Medications on File Prior to Encounter   Medication Sig    amLODIPine-benazepriL (LOTREL) 5-40 mg per capsule Take 1 capsule by mouth once daily.    ciclopirox (PENLAC) 8 % Soln Apply 1 drop topically nightly.    hydroCHLOROthiazide (MICROZIDE) 12.5 mg capsule Take 12.5 mg by mouth once daily.    levothyroxine (SYNTHROID) 75 MCG tablet Take 75 mcg by mouth once daily.    nitroGLYCERIN (NITROSTAT) 0.4 MG SL tablet Place 0.4 mg under the tongue every 5 (five) minutes as needed for Chest pain.    ondansetron (ZOFRAN-ODT) 4 MG TbDL Take 1 tablet (4 mg total) by mouth every 6 (six) hours as needed (N/V).    rosuvastatin (CRESTOR) 20 MG tablet Take 1 tablet by mouth once daily.    solifenacin (VESICARE) 10 MG tablet Take 1 tablet (10 mg total) by mouth once daily.    traMADoL (ULTRAM) 50 mg tablet Take 1 tablet by mouth every evening.    ciprofloxacin HCl (CIPRO) 500 MG tablet Take 1 tablet (500 mg total) by mouth 2 (two) times daily. for 7 days (Patient not taking: Reported on 12/9/2023)    ergocalciferol (ERGOCALCIFEROL) 50,000 unit Cap Take 50,000 Units by mouth every 7 days. SATURDAYS    furosemide (LASIX) 20 MG tablet Take 10 mg by mouth once daily.      metroNIDAZOLE (FLAGYL) 500 MG tablet Take 1 tablet (500 mg total) by mouth 3 (three) times daily. for 7 days (Patient not taking: Reported  on 2023)    pramipexole (MIRAPEX) 0.5 MG tablet Take 0.5 mg by mouth every evening.    promethazine (PHENERGAN) 25 MG tablet Take 1 tablet (25 mg total) by mouth every 6 (six) hours as needed for Nausea. (Patient not taking: Reported on 2023)    traZODone (DESYREL) 50 MG tablet Take 50 mg by mouth every evening.     Family History       Problem Relation (Age of Onset)    Cancer Father, Paternal Grandfather    Heart disease Maternal Grandfather          Tobacco Use    Smoking status: Former     Current packs/day: 0.00     Types: Cigarettes     Start date: 1971     Quit date: 2001     Years since quittin.0    Smokeless tobacco: Not on file   Substance and Sexual Activity    Alcohol use: No    Drug use: No    Sexual activity: Not on file     Review of Systems   Constitutional:  Negative for activity change, appetite change, chills and fever.   HENT:  Negative for congestion, ear pain and nosebleeds.    Eyes:  Negative for itching and visual disturbance.   Respiratory:  Negative for apnea, cough, chest tightness, shortness of breath and wheezing.    Cardiovascular:  Negative for chest pain, palpitations and leg swelling.   Gastrointestinal:  Positive for abdominal pain, diarrhea, nausea and vomiting. Negative for abdominal distention and constipation.   Genitourinary:  Negative for dysuria and flank pain.   Musculoskeletal:  Negative for back pain.   Neurological:  Negative for dizziness and headaches.     Objective:     Vital Signs (Most Recent):  Temp: 97.4 °F (36.3 °C) (23)  Pulse: 91 (23)  Resp: 17 (23)  BP: 116/65 (23)  SpO2: 98 % (23) Vital Signs (24h Range):  Temp:  [97.4 °F (36.3 °C)-97.7 °F (36.5 °C)] 97.4 °F (36.3 °C)  Pulse:  [80-91] 91  Resp:  [17-18] 17  SpO2:  [97 %-98 %] 98 %  BP: (100-116)/(61-65) 116/65     Weight: 83.5 kg (184 lb)  Body mass index is 31.58 kg/m².     Physical Exam  Vitals reviewed.   Constitutional:        "General: She is not in acute distress.     Appearance: Normal appearance. She is not ill-appearing, toxic-appearing or diaphoretic.   HENT:      Head: Normocephalic and atraumatic.      Mouth/Throat:      Mouth: Mucous membranes are moist.      Pharynx: Oropharynx is clear.   Eyes:      General: No scleral icterus.     Conjunctiva/sclera: Conjunctivae normal.   Neck:      Vascular: No carotid bruit.   Cardiovascular:      Rate and Rhythm: Normal rate and regular rhythm.      Pulses: Normal pulses.      Heart sounds: Normal heart sounds.   Pulmonary:      Effort: Pulmonary effort is normal.      Breath sounds: Normal breath sounds.   Abdominal:      General: Abdomen is flat. Bowel sounds are normal.      Palpations: Abdomen is soft.   Musculoskeletal:         General: Normal range of motion.   Skin:     General: Skin is warm.   Neurological:      General: No focal deficit present.      Mental Status: She is alert and oriented to person, place, and time. Mental status is at baseline.   Psychiatric:         Mood and Affect: Mood normal.         Behavior: Behavior normal.                Significant Labs: All pertinent labs within the past 24 hours have been reviewed.  BMP:   Recent Labs   Lab 12/09/23 2117   GLU 96   *   K 2.8*      CO2 15*   BUN 80*   CREATININE 3.6*   CALCIUM 8.1*     CBC:   Recent Labs   Lab 12/09/23 2117   WBC 10.58   HGB 13.0   HCT 37.5        CMP:   Recent Labs   Lab 12/09/23 2117   *   K 2.8*      CO2 15*   GLU 96   BUN 80*   CREATININE 3.6*   CALCIUM 8.1*   PROT 7.5   ALBUMIN 4.5   BILITOT 0.6   ALKPHOS 32*   AST 30   ALT 32   ANIONGAP 16     Lipase:   Recent Labs   Lab 12/09/23 2117   LIPASE 21     Magnesium: No results for input(s): "MG" in the last 48 hours.    Significant Imaging: I have reviewed all pertinent imaging results/findings within the past 24 hours.  "

## 2023-12-10 NOTE — PLAN OF CARE
[facility]  Initial Discharge Assessment       Primary Care Provider: Ximena Lima NP    Admission Diagnosis: Nausea vomiting and diarrhea [R11.2, R19.7]    Admission Date: 12/9/2023  Expected Discharge Date:     Transition of Care Barriers: None    Payor: HU[employer] MANAGED MEDICARE / Plan: HU[employer] MEDICARE PPO / Product Type: Medicare Advantage /     Extended Emergency Contact Information  Primary Emergency Contact: NixonRamón  Address: [address] [place] [address] [place] [address] [facility]  Home Phone: [phone]  Mobile Phone: [phone]  Relation: Spouse    Discharge Plan A: Home with family  Discharge Plan B: Home with family      [id number] - [place], [place] - [address] AT [facility]  [address]  [place] [address]  Phone: [phone] Fax: [phone]    CM completed discharge assessment with patient. Pt AAOx4s. Pt lives with spouse. Demographics, PCP, and insurance verified. No home health. No dialysis. No DME..Pt completes ADLs without assistance. No POA or LW. Pt to discharge home via family transport. Pt has no other needs to be addressed at this time.    Initial Assessment (most recent)       Adult Discharge Assessment - 12/10/23 1228          Discharge Assessment    Assessment Type Discharge Planning Assessment     Confirmed/corrected address, phone number and insurance Yes     Confirmed Demographics Correct on Facesheet     Source of Information patient     When was your last doctors appointment? 12/09/23     Communicated LILA with patient/caregiver Date not available/Unable to determine     Reason For Admission Diarrhea     People in Home spouse     Do you expect to return to your current living situation? Yes     Do you have help at home or someone to help you manage your care at home? Yes     Who are your caregiver(s) and their phone number(s)? Ramón Alicea (spouse) [phone] provides assistance as needed     Prior to hospitilization  cognitive status: Alert/Oriented     Current cognitive status: Alert/Oriented     Walking or Climbing Stairs Difficulty no     Dressing/Bathing Difficulty no     Home Layout Able to live on 1st floor     Equipment Currently Used at Home none     Readmission within 30 days? No     Patient currently being followed by outpatient case management? No     Do you currently have service(s) that help you manage your care at home? No     Do you take prescription medications? Yes     Do you have prescription coverage? Yes     Coverage [employer] Managed Medicare     Do you have any problems affording any of your prescribed medications? No     Is the patient taking medications as prescribed? yes     Who is going to help you get home at discharge? Spouse     How do you get to doctors appointments? car, drives self     Are you on dialysis? No     Do you take coumadin? No     Discharge Plan A Home with family     Discharge Plan B Home with family     DME Needed Upon Discharge  none     Discharge Plan discussed with: Patient     Transition of Care Barriers None

## 2023-12-10 NOTE — CARE UPDATE
Patient seen on rounds.  Agree with admitting physician assessment and plan.      Diarrhea  Likely viral- colitis/enteritis- sent stool studies  C/w IVF and Rocephin/Flagyl   Anti-emetics prn  Advance diet as tolerated  Stool studies pending         Electrolyte abnormality  Replete and daily BMP        Chest pain  Trend troponin  EKG         Hypothyroidism  C/w home meds        HERBERTH (acute kidney injury)  HERBERTH on CKD  C/w IVF   Daily BMP

## 2023-12-10 NOTE — PHARMACY MED REC
"        Admission Medication History     The home medication history was taken by Jessica Monahan.    You may go to "Admission" then "Reconcile Home Medications" tabs to review and/or act upon these items.     The home medication list has been updated by the Pharmacy department.   Please read ALL comments highlighted in yellow.   Please address this information as you see fit.    Feel free to contact us if you have any questions or require assistance.        Medications listed below were obtained from: Patient/family and Analytic software- [email or web address]  Current Facility-Administered Medications on File Prior to Encounter   Medication Dose Route Frequency Provider Last Rate Last Admin    [COMPLETED] promethazine injection 25 mg  25 mg Intramuscular 1 time in Clinic/HOD Keren Johnson, FNP   25 mg at 12/09/23 1235     Current Outpatient Medications on File Prior to Encounter   Medication Sig Dispense Refill    amLODIPine-benazepriL (LOTREL) 5-40 mg per capsule Take 1 capsule by mouth once daily.      ciclopirox (PENLAC) 8 % Soln Apply 1 drop topically nightly.      hydroCHLOROthiazide (MICROZIDE) 12.5 mg capsule Take 12.5 mg by mouth once daily.      levothyroxine (SYNTHROID) 75 MCG tablet Take 75 mcg by mouth once daily.      nitroGLYCERIN (NITROSTAT) 0.4 MG SL tablet Place 0.4 mg under the tongue every 5 (five) minutes as needed for Chest pain.      ondansetron (ZOFRAN-ODT) 4 MG TbDL Take 1 tablet (4 mg total) by mouth every 6 (six) hours as needed (N/V). 20 tablet 0    rosuvastatin (CRESTOR) 20 MG tablet Take 1 tablet by mouth once daily.      solifenacin (VESICARE) 10 MG tablet Take 1 tablet (10 mg total) by mouth once daily. 30 tablet 11    traMADoL (ULTRAM) 50 mg tablet Take 1 tablet by mouth every evening.      [DISCONTINUED] cholecalciferol, vitamin D3, 1,250 mcg (50,000 unit) capsule Take 50,000 Units by mouth every 7 days. SAT      ciprofloxacin HCl (CIPRO) 500 MG tablet Take 1 tablet (500 mg total) by " mouth 2 (two) times daily. for 7 days (Patient not taking: Reported on 12/9/2023) 14 tablet 0    ergocalciferol (ERGOCALCIFEROL) 50,000 unit Cap Take 50,000 Units by mouth every 7 days. SATURDAYS      furosemide (LASIX) 20 MG tablet Take 10 mg by mouth once daily.        metroNIDAZOLE (FLAGYL) 500 MG tablet Take 1 tablet (500 mg total) by mouth 3 (three) times daily. for 7 days (Patient not taking: Reported on 12/9/2023) 21 tablet 0    pramipexole (MIRAPEX) 0.5 MG tablet Take 0.5 mg by mouth every evening.      promethazine (PHENERGAN) 25 MG tablet Take 1 tablet (25 mg total) by mouth every 6 (six) hours as needed for Nausea. (Patient not taking: Reported on 12/9/2023) 20 tablet 0    traZODone (DESYREL) 50 MG tablet Take 50 mg by mouth every evening.      [DISCONTINUED] amlodipine (NORVASC) 10 MG tablet Take 10 mg by mouth once daily.        [DISCONTINUED] amLODIPine-benazepriL (LOTREL) 5-40 mg per capsule Take 1 capsule by mouth once daily.      [DISCONTINUED] diclofenac sodium (VOLTAREN) 1 % Gel Voltaren 1 % topical gel   APPLY 2 GRAM TO THE AFFECTED AREA(S) BY TOPICAL ROUTE 4 TIMES PER DAY as needed      [DISCONTINUED] polyethylene glycol (MIRALAX) 17 gram/dose powder Take 17 g every day by oral route for 90 days.      [DISCONTINUED] simvastatin (ZOCOR) 20 MG tablet Take 20 mg by mouth every evening.           Potential issues to be addressed PRIOR TO DISCHARGE  Patient reported not taking the following medications: (Lasix, Ciprofloxacin, Flagyl, Mirapex, Promethazine & Trazodone). These medications remain on the home medication list. Please address accordingly.     Jessica Monahan  EXT 1924          .

## 2023-12-10 NOTE — ED PROVIDER NOTES
Encounter Date: 2023       History     Chief Complaint   Patient presents with    Vomiting     Vomiting and diarrhea x 4 days.      Patient presents emergency department with reported nausea vomiting diarrhea over last 4 days unable to keep anything down since Wednesday she seen at urgent care given a shot of Phenergan for symptoms persist she does report sick contacts at home she denies any blood in his stool or bloody emesis she denies any current fever chills states she did runs a fever on day 1 she reports she initially had generalized abdominal pain and cramping but the pain seems have improved the diarrhea and nausea persist she has had bladder suspension hysterectomy cholecystectomy in the past        Review of patient's allergies indicates:   Allergen Reactions    Lyrica [pregabalin] Swelling    Sulfa (sulfonamide antibiotics) Swelling    Toradol [ketorolac] Swelling     Past Medical History:   Diagnosis Date    Anemia     Colon polyp     Kidney stone     Kidney stones 10/29/2012    Thyroid disease      Past Surgical History:   Procedure Laterality Date    BLADDER SURGERY      CHOLECYSTECTOMY      CYSTOSCOPY      HYSTERECTOMY      OOPHORECTOMY      tonsillectomy   1964     Family History   Problem Relation Age of Onset    Cancer Father     Cancer Paternal Grandfather     Heart disease Maternal Grandfather      Social History     Tobacco Use    Smoking status: Former     Current packs/day: 0.00     Types: Cigarettes     Start date: 1971     Quit date: 2001     Years since quittin.0   Substance Use Topics    Alcohol use: No    Drug use: No     Review of Systems   Constitutional:  Positive for fatigue. Negative for chills and fever.   HENT:  Negative for congestion and sore throat.    Respiratory:  Negative for cough.    Cardiovascular:  Negative for chest pain.   Gastrointestinal:  Positive for abdominal pain, diarrhea, nausea and vomiting. Negative for abdominal distention and blood in  stool.   Neurological:  Positive for light-headedness.   All other systems reviewed and are negative.      Physical Exam     Initial Vitals [12/09/23 2036]   BP Pulse Resp Temp SpO2   116/65 91 17 97.4 °F (36.3 °C) 98 %      MAP       --         Physical Exam    Constitutional: She appears well-developed and well-nourished. No distress.   HENT:   Head: Normocephalic and atraumatic.   Right Ear: External ear normal.   Left Ear: External ear normal.   Mouth/Throat: Oropharynx is clear and moist.   Eyes: Conjunctivae and EOM are normal. Pupils are equal, round, and reactive to light.   Neck: Neck supple.   Normal range of motion.  Cardiovascular:  Normal rate, regular rhythm, normal heart sounds and intact distal pulses.           Pulmonary/Chest: Breath sounds normal. No respiratory distress.   Abdominal: Abdomen is soft. Bowel sounds are normal. There is no abdominal tenderness.   Musculoskeletal:         General: No edema. Normal range of motion.      Cervical back: Normal range of motion and neck supple.     Neurological: She is alert and oriented to person, place, and time. She has normal strength. GCS score is 15. GCS eye subscore is 4. GCS verbal subscore is 5. GCS motor subscore is 6.   Skin: Skin is warm and dry. Capillary refill takes less than 2 seconds. No rash noted.   Psychiatric: She has a normal mood and affect. Her behavior is normal.         ED Course   Procedures  Labs Reviewed   CBC W/ AUTO DIFFERENTIAL - Abnormal; Notable for the following components:       Result Value    Gran # (ANC) 8.1 (*)     Mono # 1.3 (*)     Gran % 76.9 (*)     Lymph % 10.6 (*)     All other components within normal limits   COMPREHENSIVE METABOLIC PANEL - Abnormal; Notable for the following components:    Sodium 135 (*)     Potassium 2.8 (*)     CO2 15 (*)     BUN 80 (*)     Creatinine 3.6 (*)     Calcium 8.1 (*)     Alkaline Phosphatase 32 (*)     eGFR 12.9 (*)     All other components within normal limits   URINALYSIS,  REFLEX TO URINE CULTURE - Abnormal; Notable for the following components:    Appearance, UA Hazy (*)     Protein, UA 1+ (*)     Bilirubin (UA) 1+ (*)     Occult Blood UA Trace (*)     All other components within normal limits    Narrative:     Specimen Source->Urine   URINALYSIS MICROSCOPIC - Abnormal; Notable for the following components:    RBC, UA 5 (*)     WBC, UA 12 (*)     Hyaline Casts, UA 92 (*)     All other components within normal limits    Narrative:     Specimen Source->Urine   RESPIRATORY INFECTION PANEL (PCR), NASOPHARYNGEAL    Narrative:     Specimen Source->Nasopharyngeal Swab   RESPIRATORY VIRAL PANEL PCR, PEDS UNDER 7 MTHS   CULTURE, URINE   LIPASE          Imaging Results              CT Abdomen Pelvis  Without Contrast (Final result)  Result time 12/09/23 23:18:17   Procedure changed from CT Abdomen Pelvis With IV Contrast NO Oral Contrast     Final result by Kiana Pena MD (12/09/23 23:18:17)                   Narrative:    EXAM DESCRIPTION:  CT ABDOMEN PELVIS WITHOUT CONTRAST    CLINICAL HISTORY:  73 years  Female;  Abdominal abscess/infection suspected    TECHNIQUE:  CT of the abdomen and pelvis without intravenous contrast. Oral contrast Was not used.  All CT scans at this facility use dose modulation, iterative reconstruction, and/or weight based dosing when appropriate to reduce radiation dose to as low as reasonably achievable.    This exam was performed according to our department optimization program which includes automated exposure control, adjustment of the mA and/or kv according to patient size and/or use of iterative reconstruction technique.    COMPARISON: Unenhanced CT scan of the abdomen and pelvis September 2, 2004. Report for this exam is not available.    FINDINGS:  Lower chest:Lungs appear mildly hyperinflated. Heart size is within normal limits. No focal consolidation. No pneumothorax or pleural lesion.    Abdomen:  Liver and biliary tree:The unenhanced liver is  unremarkable. The gallbladder is surgically absent.  Pancreas: Unremarkable  Spleen:Within normal limits  Kidneys:  Kidneys are normal in size, shape and position. 3 mm nonobstructing stone in the lower pole of the right kidney..  No mass or hydronephrosis.  Adrenal glands:Within normal limits  Vascular structures:There are scattered vascular plaques in aorta. No aneurysm.  Retroperitoneum:  Prominence of the mesenteric fat is seen along the root of the mesentery with minimal lymph nodes measuring up to 5 mm. 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  Abdominal wall:  normal  GI:Bowel is of normal caliber.  No focal bowel wall thickening.  No obstruction.   Appendix: The appendix appears normal.  General:  No free air.  No free fluid    Pelvis:  Lymph nodes:  No mass or lymphadenopathy  Bladder: Unremarkable.  Pelvis: Uterus is not seen. No adnexal  Bones: No acute bone findings.    IMPRESSION:  1.  Prominence of the mesenteric fat along the root of the mesentery with minimal lymph nodes measuring up to 5 mm. 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  2.  3 mm nonobstructing stone in the lower pole of the right kidney.  3.  No acute process is identified in the abdomen or pelvis.    Electronically signed by:  Kiana Pena MD  12/09/2023 11:18 PM CST Workstation: [id number]                                     Medications   0.9%  NaCl infusion (has no administration in time range)   sodium chloride 0.9% bolus 1,000 mL 1,000 mL (0 mLs Intravenous Stopped 12/9/23 2304)   ondansetron injection 4 mg (4 mg Intravenous Given 12/9/23 2132)   potassium chloride 10 mEq in 100 mL IVPB (0 mEq Intravenous Stopped 12/10/23 0012)     Medical Decision Making  Laboratory evaluation reviewed patient does have evidence of HERBERTH with BUN of 30 creatinine over 3 she received IV fluid resuscitation emergency department Zofran I have discussed patient's findings with Dr. Ramey CT scan  shows evidence of mild mesenteric edema likely from her acute gastrointestinal illness will admit for continued IV fluid resuscitation    Amount and/or Complexity of Data Reviewed  Labs: ordered. Decision-making details documented in ED Course.  Radiology: ordered. Decision-making details documented in ED Course.    Risk  Prescription drug management.  Decision regarding hospitalization.                                      Clinical Impression:  Final diagnoses:  [R11.2, R19.7] Nausea vomiting and diarrhea (Primary)  [N17.9] HERBERTH (acute kidney injury)  [E86.0] Dehydration          ED Disposition Condition    Admit Stable                Jayden Arellano MD  12/10/23 0016

## 2023-12-10 NOTE — H&P
Formerly [facility] - Emergency Dept  Hospital Medicine  History & Physical    Patient Name: Darshana Alicea  MRN: [id number]  Patient Class: IP- Inpatient  Admission Date: 12/9/2023  Attending Physician: Florencia Ramey MD  Primary Care Provider: Ximena Lima NP         Patient information was obtained from patient and ER records.     Subjective:     Principal Problem:Diarrhea    Chief Complaint:   Chief Complaint   Patient presents with    Vomiting     Vomiting and diarrhea x 4 days.         HPI: Ms. Alicea is a 73 year old woman with PMHx of HTN, Hypothyroidism- came to ER with 4 days hx of vomiting/non-bloody diarrhea- which she thinks she got it from her granddaughter- she was not able to keep anything down for past 4 days and she is also peeing less lately- she had substernal pressure like chest pain this morning which she says she has angina diagnosed by her doctor she underwent NST this year which as per her is normal. Otherwise no fever, chills.   Pt's  at bedside saying now he thinks he got it from her.     Ct scan reviewed in ER and discussed with ER physician.     Past Medical History:   Diagnosis Date    Anemia     Colon polyp     Kidney stone     Kidney stones 10/29/2012    Thyroid disease        Past Surgical History:   Procedure Laterality Date    BLADDER SURGERY      CHOLECYSTECTOMY      CYSTOSCOPY      HYSTERECTOMY      OOPHORECTOMY      tonsillectomy   1964       Review of patient's allergies indicates:   Allergen Reactions    Lyrica [pregabalin] Swelling    Sulfa (sulfonamide antibiotics) Swelling    Toradol [ketorolac] Swelling       Current Facility-Administered Medications on File Prior to Encounter   Medication    [COMPLETED] promethazine injection 25 mg     Current Outpatient Medications on File Prior to Encounter   Medication Sig    amLODIPine-benazepriL (LOTREL) 5-40 mg per capsule Take 1 capsule by mouth once daily.    ciclopirox (PENLAC) 8 % Soln Apply 1 drop topically nightly.     hydroCHLOROthiazide (MICROZIDE) 12.5 mg capsule Take 12.5 mg by mouth once daily.    levothyroxine (SYNTHROID) 75 MCG tablet Take 75 mcg by mouth once daily.    nitroGLYCERIN (NITROSTAT) 0.4 MG SL tablet Place 0.4 mg under the tongue every 5 (five) minutes as needed for Chest pain.    ondansetron (ZOFRAN-ODT) 4 MG TbDL Take 1 tablet (4 mg total) by mouth every 6 (six) hours as needed (N/V).    rosuvastatin (CRESTOR) 20 MG tablet Take 1 tablet by mouth once daily.    solifenacin (VESICARE) 10 MG tablet Take 1 tablet (10 mg total) by mouth once daily.    traMADoL (ULTRAM) 50 mg tablet Take 1 tablet by mouth every evening.    ciprofloxacin HCl (CIPRO) 500 MG tablet Take 1 tablet (500 mg total) by mouth 2 (two) times daily. for 7 days (Patient not taking: Reported on 2023)    ergocalciferol (ERGOCALCIFEROL) 50,000 unit Cap Take 50,000 Units by mouth every 7 days.     furosemide (LASIX) 20 MG tablet Take 10 mg by mouth once daily.      metroNIDAZOLE (FLAGYL) 500 MG tablet Take 1 tablet (500 mg total) by mouth 3 (three) times daily. for 7 days (Patient not taking: Reported on 2023)    pramipexole (MIRAPEX) 0.5 MG tablet Take 0.5 mg by mouth every evening.    promethazine (PHENERGAN) 25 MG tablet Take 1 tablet (25 mg total) by mouth every 6 (six) hours as needed for Nausea. (Patient not taking: Reported on 2023)    traZODone (DESYREL) 50 MG tablet Take 50 mg by mouth every evening.     Family History       Problem Relation (Age of Onset)    Cancer Father, Paternal Grandfather    Heart disease Maternal Grandfather          Tobacco Use    Smoking status: Former     Current packs/day: 0.00     Types: Cigarettes     Start date: 1971     Quit date: 2001     Years since quittin.0    Smokeless tobacco: Not on file   Substance and Sexual Activity    Alcohol use: No    Drug use: No    Sexual activity: Not on file     Review of Systems   Constitutional:  Negative for activity change, appetite  change, chills and fever.   HENT:  Negative for congestion, ear pain and nosebleeds.    Eyes:  Negative for itching and visual disturbance.   Respiratory:  Negative for apnea, cough, chest tightness, shortness of breath and wheezing.    Cardiovascular:  Negative for chest pain, palpitations and leg swelling.   Gastrointestinal:  Positive for abdominal pain, diarrhea, nausea and vomiting. Negative for abdominal distention and constipation.   Genitourinary:  Negative for dysuria and flank pain.   Musculoskeletal:  Negative for back pain.   Neurological:  Negative for dizziness and headaches.     Objective:     Vital Signs (Most Recent):  Temp: 97.4 °F (36.3 °C) (12/09/23 2036)  Pulse: 91 (12/09/23 2036)  Resp: 17 (12/09/23 2036)  BP: 116/65 (12/09/23 2036)  SpO2: 98 % (12/09/23 2036) Vital Signs (24h Range):  Temp:  [97.4 °F (36.3 °C)-97.7 °F (36.5 °C)] 97.4 °F (36.3 °C)  Pulse:  [80-91] 91  Resp:  [17-18] 17  SpO2:  [97 %-98 %] 98 %  BP: (100-116)/(61-65) 116/65     Weight: 83.5 kg (184 lb)  Body mass index is 31.58 kg/m².     Physical Exam  Vitals reviewed.   Constitutional:       General: She is not in acute distress.     Appearance: Normal appearance. She is not ill-appearing, toxic-appearing or diaphoretic.   HENT:      Head: Normocephalic and atraumatic.      Mouth/Throat:      Mouth: Mucous membranes are moist.      Pharynx: Oropharynx is clear.   Eyes:      General: No scleral icterus.     Conjunctiva/sclera: Conjunctivae normal.   Neck:      Vascular: No carotid bruit.   Cardiovascular:      Rate and Rhythm: Normal rate and regular rhythm.      Pulses: Normal pulses.      Heart sounds: Normal heart sounds.   Pulmonary:      Effort: Pulmonary effort is normal.      Breath sounds: Normal breath sounds.   Abdominal:      General: Abdomen is flat. Bowel sounds are normal.      Palpations: Abdomen is soft.   Musculoskeletal:         General: Normal range of motion.   Skin:     General: Skin is warm.  "  Neurological:      General: No focal deficit present.      Mental Status: She is alert and oriented to person, place, and time. Mental status is at baseline.   Psychiatric:         Mood and Affect: Mood normal.         Behavior: Behavior normal.                Significant Labs: All pertinent labs within the past 24 hours have been reviewed.  BMP:   Recent Labs   Lab 12/09/23 2117   GLU 96   *   K 2.8*      CO2 15*   BUN 80*   CREATININE 3.6*   CALCIUM 8.1*     CBC:   Recent Labs   Lab 12/09/23 2117   WBC 10.58   HGB 13.0   HCT 37.5        CMP:   Recent Labs   Lab 12/09/23 2117   *   K 2.8*      CO2 15*   GLU 96   BUN 80*   CREATININE 3.6*   CALCIUM 8.1*   PROT 7.5   ALBUMIN 4.5   BILITOT 0.6   ALKPHOS 32*   AST 30   ALT 32   ANIONGAP 16     Lipase:   Recent Labs   Lab 12/09/23 2117   LIPASE 21     Magnesium: No results for input(s): "MG" in the last 48 hours.    Significant Imaging: I have reviewed all pertinent imaging results/findings within the past 24 hours.  Assessment/Plan:     * Diarrhea  Likely viral- colitis/enteritis- sent stool studies  C/w IVF and Rocephin/Flagyl   Anti-emetics prn    Advance diet as tolerated      Electrolyte abnormality  Replete and daily BMP      Chest pain  Trend troponin  EKG       Hypothyroidism  C/w home meds      HERBERTH (acute kidney injury)  HERBERTH on CKD  C/w IVF   Daily BMP      VTE Risk Mitigation (From admission, onward)      None                            Florencia Ramey MD  Department of Hospital Medicine  [facility] - Emergency Dept          "

## 2023-12-10 NOTE — HPI
Ms. Alicea is a 73 year old woman with PMHx of HTN, Hypothyroidism- came to ER with 4 days hx of vomiting/non-bloody diarrhea- which she thinks she got it from her granddaughter- she was not able to keep anything down for past 4 days and she is also peeing less lately- she had substernal pressure like chest pain this morning which she says she has angina diagnosed by her doctor she underwent NST this year which as per her is normal. Otherwise no fever, chills.   Pt's  at bedside saying now he thinks he got it from her.     Ct scan reviewed in ER and discussed with ER physician.

## 2023-12-10 NOTE — PROGRESS NOTES
Pharmacist Renal Dose Adjustment Note    Darshana Alicea is a 73 y.o. female being treated with the medication Enoxaparin    Patient Data:    Vital Signs (Most Recent):  Temp: 97.9 °F (36.6 °C) (12/10/23 0122)  Pulse: 81 (12/10/23 0122)  Resp: 17 (12/10/23 0122)  BP: (!) 126/57 (12/10/23 0122)  SpO2: 98 % (12/10/23 0122) Vital Signs (72h Range):  Temp:  [97.4 °F (36.3 °C)-97.9 °F (36.6 °C)]   Pulse:  [76-91]   Resp:  [17-18]   BP: (100-126)/(56-65)   SpO2:  [97 %-99 %]        Ht:    Wt: 83.5 kg (184 lb)  Estimated Creatinine Clearance: 14.8 mL/min (A) (based on SCr of 3.6 mg/dL (H)).  Body mass index is 31.58 kg/m².    Per [facility] renal dosing protocol:     Previous Order: Enoxaparin 40 mg Q24H    Will be changed to:     New Order: Enoxaparin 30 mg Q24H,    Due to: Per Pharmacy Protocol    Renal dose adjustments performed as noted above.    We will continue monitoring and adjusting as necessary.    Pharmacist: García Costa, PharmD  Ext: [phone]

## 2023-12-11 LAB
ALBUMIN SERPL BCP-MCNC: 3.4 G/DL (ref 3.5–5.2)
ALP SERPL-CCNC: 24 U/L (ref 55–135)
ALT SERPL W/O P-5'-P-CCNC: 25 U/L (ref 10–44)
ANION GAP SERPL CALC-SCNC: 5 MMOL/L (ref 8–16)
AST SERPL-CCNC: 25 U/L (ref 10–40)
BASOPHILS # BLD AUTO: 0.01 K/UL (ref 0–0.2)
BASOPHILS NFR BLD: 0.2 % (ref 0–1.9)
BILIRUB SERPL-MCNC: 0.4 MG/DL (ref 0.1–1)
BUN SERPL-MCNC: 33 MG/DL (ref 8–23)
CALCIUM SERPL-MCNC: 7.4 MG/DL (ref 8.7–10.5)
CHLORIDE SERPL-SCNC: 117 MMOL/L (ref 95–110)
CO2 SERPL-SCNC: 21 MMOL/L (ref 23–29)
CREAT SERPL-MCNC: 1.3 MG/DL (ref 0.5–1.4)
DIFFERENTIAL METHOD BLD: ABNORMAL
EOSINOPHIL # BLD AUTO: 0.1 K/UL (ref 0–0.5)
EOSINOPHIL NFR BLD: 1.2 % (ref 0–8)
ERYTHROCYTE [DISTWIDTH] IN BLOOD BY AUTOMATED COUNT: 14.5 % (ref 11.5–14.5)
EST. GFR  (NO RACE VARIABLE): 43.7 ML/MIN/1.73 M^2
GLUCOSE SERPL-MCNC: 87 MG/DL (ref 70–110)
HCT VFR BLD AUTO: 29.5 % (ref 37–48.5)
HGB BLD-MCNC: 10.3 G/DL (ref 12–16)
IMM GRANULOCYTES # BLD AUTO: 0.01 K/UL (ref 0–0.04)
IMM GRANULOCYTES NFR BLD AUTO: 0.2 % (ref 0–0.5)
LYMPHOCYTES # BLD AUTO: 1.3 K/UL (ref 1–4.8)
LYMPHOCYTES NFR BLD: 26.4 % (ref 18–48)
MCH RBC QN AUTO: 31.2 PG (ref 27–31)
MCHC RBC AUTO-ENTMCNC: 34.9 G/DL (ref 32–36)
MCV RBC AUTO: 89 FL (ref 82–98)
MONOCYTES # BLD AUTO: 0.5 K/UL (ref 0.3–1)
MONOCYTES NFR BLD: 10.9 % (ref 4–15)
NEUTROPHILS # BLD AUTO: 3 K/UL (ref 1.8–7.7)
NEUTROPHILS NFR BLD: 61.1 % (ref 38–73)
NRBC BLD-RTO: 0 /100 WBC
PLATELET # BLD AUTO: 140 K/UL (ref 150–450)
PMV BLD AUTO: 10.6 FL (ref 9.2–12.9)
POTASSIUM SERPL-SCNC: 3.4 MMOL/L (ref 3.5–5.1)
PROT SERPL-MCNC: 5.8 G/DL (ref 6–8.4)
RBC # BLD AUTO: 3.3 M/UL (ref 4–5.4)
SODIUM SERPL-SCNC: 143 MMOL/L (ref 136–145)
WBC # BLD AUTO: 4.85 K/UL (ref 3.9–12.7)

## 2023-12-11 PROCEDURE — 36415 COLL VENOUS BLD VENIPUNCTURE: CPT | Performed by: STUDENT IN AN ORGANIZED HEALTH CARE EDUCATION/TRAINING PROGRAM

## 2023-12-11 PROCEDURE — 94761 N-INVAS EAR/PLS OXIMETRY MLT: CPT

## 2023-12-11 PROCEDURE — 25000003 PHARM REV CODE 250: Performed by: STUDENT IN AN ORGANIZED HEALTH CARE EDUCATION/TRAINING PROGRAM

## 2023-12-11 PROCEDURE — 80053 COMPREHEN METABOLIC PANEL: CPT | Performed by: STUDENT IN AN ORGANIZED HEALTH CARE EDUCATION/TRAINING PROGRAM

## 2023-12-11 PROCEDURE — 12000002 HC ACUTE/MED SURGE SEMI-PRIVATE ROOM

## 2023-12-11 PROCEDURE — 85025 COMPLETE CBC W/AUTO DIFF WBC: CPT | Performed by: STUDENT IN AN ORGANIZED HEALTH CARE EDUCATION/TRAINING PROGRAM

## 2023-12-11 PROCEDURE — 63600175 PHARM REV CODE 636 W HCPCS: Performed by: STUDENT IN AN ORGANIZED HEALTH CARE EDUCATION/TRAINING PROGRAM

## 2023-12-11 RX ADMIN — LEVOTHYROXINE SODIUM 75 MCG: 0.03 TABLET ORAL at 05:12

## 2023-12-11 RX ADMIN — METRONIDAZOLE 500 MG: 5 INJECTION, SOLUTION INTRAVENOUS at 01:12

## 2023-12-11 RX ADMIN — CEFTRIAXONE SODIUM 1 G: 1 INJECTION, POWDER, FOR SOLUTION INTRAMUSCULAR; INTRAVENOUS at 12:12

## 2023-12-11 RX ADMIN — ATORVASTATIN CALCIUM 40 MG: 40 TABLET, FILM COATED ORAL at 08:12

## 2023-12-11 RX ADMIN — SODIUM CHLORIDE: 0.9 INJECTION, SOLUTION INTRAVENOUS at 02:12

## 2023-12-11 RX ADMIN — ENOXAPARIN SODIUM 30 MG: 30 INJECTION SUBCUTANEOUS at 04:12

## 2023-12-11 RX ADMIN — SODIUM CHLORIDE: 0.9 INJECTION, SOLUTION INTRAVENOUS at 05:12

## 2023-12-11 RX ADMIN — METRONIDAZOLE 500 MG: 5 INJECTION, SOLUTION INTRAVENOUS at 08:12

## 2023-12-11 RX ADMIN — METRONIDAZOLE 500 MG: 5 INJECTION, SOLUTION INTRAVENOUS at 04:12

## 2023-12-11 NOTE — PROGRESS NOTES
[facility] Medicine  Progress Note    Patient name: Darshana Alicea  MRN: [id number]  Admit Date: 12/9/2023   LOS: 1 day     SUBJECTIVE:     Principal problem: Diarrhea        Interval History:      12/11- patient is very sleepy, says she was up all  night going to the restroom with diarrhea.  She is able to eat without issues.  Vitals stable.  Creat improved.  Will keep another night for more IVF and monitor for improvement in diarrhea.      Scheduled Meds:   atorvastatin  40 mg Oral Daily    cefTRIAXone (ROCEPHIN) IVPB  1 g Intravenous Q24H    enoxparin  30 mg Subcutaneous Q24H (prophylaxis, 1700)    levothyroxine  75 mcg Oral Before breakfast    metronidazole  500 mg Intravenous Q8H     Continuous Infusions:   sodium chloride 0.9% 125 mL/hr at 12/11/23 1401     PRN Meds:melatonin, nitroGLYCERIN, ondansetron, promethazine, sodium chloride 0.9%    Review of patient's allergies indicates:   Allergen Reactions    Lyrica [pregabalin] Swelling    Sulfa (sulfonamide antibiotics) Swelling    Toradol [ketorolac] Swelling       Review of Systems: As per interval history    OBJECTIVE:     Vital Signs (Most Recent)  Temp: 97.5 °F (36.4 °C) (12/11/23 1228)  Pulse: (!) 51 (12/11/23 1228)  Resp: 18 (12/11/23 1228)  BP: (!) 121/55 (12/11/23 1228)  SpO2: 100 % (12/11/23 1228)    Vital Signs Range (Last 24H):  Temp:  [97.5 °F (36.4 °C)-97.9 °F (36.6 °C)]   Pulse:  [50-65]   Resp:  [16-18]   BP: ()/(55-66)   SpO2:  [97 %-100 %]     I & O (Last 24H):  Intake/Output Summary (Last 24 hours) at 12/11/2023 1526  Last data filed at 12/11/2023 1351  Gross per 24 hour   Intake 3465.83 ml   Output --   Net 3465.83 ml       Physical Exam:  General: tired, still ill appearing  Eyes: No conjunctival injection. No scleral icterus.  ENT: Hearing grossly intact. No discharge from ears. No nasal discharge.   Neck: Supple, trachea midline. No JVD  CVS: RRR. No LE edema BL  Lungs:  No tachypnea or accessory muscle use.  Clear  to auscultation bilaterally  Abdomen:  Soft, nontender and nondistended.  No organomegaly  Neuro: AOx3. Moves all extremities. Follows commands. Responds appropriately   Skin:  No rash or erythema noted    Laboratory:  I have reviewed all pertinent lab results within the past 24 hours.    Diagnostic Results:  Labs: Reviewed  ECG: Reviewed  X-Ray: Reviewed  CT: Reviewed    ASSESSMENT/PLAN:         Active Hospital Problems    Diagnosis  POA    *Diarrhea [R19.7]  Unknown    HERBERTH (acute kidney injury) [N17.9]  Unknown    Hypothyroidism [E03.9]  Unknown    Chest pain [R07.9]  Unknown    Electrolyte abnormality [E87.8]  Unknown      Resolved Hospital Problems   No resolved problems to display.         Plan:     Diarrhea  Likely viral- colitis/enteritis- sent stool studies  C/w IVF and Rocephin/Flagyl   Anti-emetics prn  Advance diet as tolerated  Stool studies negative so far        Electrolyte abnormality  Replete and daily BMP        Chest pain  Trend troponin  EKG         Hypothyroidism  C/w home meds        HERBERTH (acute kidney injury), improved  HERBERTH on CKD  C/w IVF   Daily BMP    VTE Risk Mitigation (From admission, onward)           Ordered     enoxaparin injection 30 mg  Every 24 hours         12/10/23 0200     IP VTE HIGH RISK PATIENT  Once         12/10/23 0158     Place sequential compression device  Until discontinued         12/10/23 0158                        Department Hospital Medicine  [facility]  Cyrus Salcedo MD  Date of service: 12/11/2023

## 2023-12-11 NOTE — CARE UPDATE
12/10/23 4930   Patient Assessment/Suction   Level of Consciousness (AVPU) alert   Respiratory Effort Normal;Unlabored   Expansion/Accessory Muscles/Retractions no retractions;expansion symmetric;no use of accessory muscles   All Lung Fields Breath Sounds clear;equal bilaterally   Rhythm/Pattern, Respiratory unlabored;pattern regular;depth regular;no shortness of breath reported   Cough Frequency no cough   PRE-TX-O2   Device (Oxygen Therapy) room air   SpO2 99 %   Pulse Oximetry Type Intermittent   $ Pulse Oximetry - Single Charge Pulse Oximetry - Single   Pulse (!) 58   Resp 16   Positioning   Head of Bed (HOB) Positioning HOB elevated   Respiratory Evaluation   $ Care Plan Tech Time 15 min   $ Eval/Re-eval Charges Re-evaluation   Evaluation For New Orders

## 2023-12-11 NOTE — CARE UPDATE
12/11/23 0759   PRE-TX-O2   Device (Oxygen Therapy) room air   SpO2 97 %   Pulse Oximetry Type Intermittent   $ Pulse Oximetry - Multiple Charge Pulse Oximetry - Multiple   Pulse 60   Resp 18

## 2023-12-12 VITALS
SYSTOLIC BLOOD PRESSURE: 154 MMHG | BODY MASS INDEX: 30.86 KG/M2 | OXYGEN SATURATION: 99 % | HEART RATE: 52 BPM | WEIGHT: 180.75 LBS | RESPIRATION RATE: 18 BRPM | HEIGHT: 64 IN | TEMPERATURE: 98 F | DIASTOLIC BLOOD PRESSURE: 57 MMHG

## 2023-12-12 LAB
ALBUMIN SERPL BCP-MCNC: 3.2 G/DL (ref 3.5–5.2)
ALP SERPL-CCNC: 26 U/L (ref 55–135)
ALT SERPL W/O P-5'-P-CCNC: 27 U/L (ref 10–44)
ANION GAP SERPL CALC-SCNC: 6 MMOL/L (ref 8–16)
AST SERPL-CCNC: 26 U/L (ref 10–40)
BACTERIA UR CULT: NO GROWTH
BASOPHILS # BLD AUTO: 0.02 K/UL (ref 0–0.2)
BASOPHILS NFR BLD: 0.4 % (ref 0–1.9)
BILIRUB SERPL-MCNC: 0.3 MG/DL (ref 0.1–1)
BUN SERPL-MCNC: 18 MG/DL (ref 8–23)
CALCIUM SERPL-MCNC: 7.6 MG/DL (ref 8.7–10.5)
CHLORIDE SERPL-SCNC: 116 MMOL/L (ref 95–110)
CO2 SERPL-SCNC: 23 MMOL/L (ref 23–29)
CREAT SERPL-MCNC: 1 MG/DL (ref 0.5–1.4)
DIFFERENTIAL METHOD BLD: ABNORMAL
EOSINOPHIL # BLD AUTO: 0.1 K/UL (ref 0–0.5)
EOSINOPHIL NFR BLD: 1.7 % (ref 0–8)
ERYTHROCYTE [DISTWIDTH] IN BLOOD BY AUTOMATED COUNT: 14.5 % (ref 11.5–14.5)
EST. GFR  (NO RACE VARIABLE): 59.9 ML/MIN/1.73 M^2
GLUCOSE SERPL-MCNC: 107 MG/DL (ref 70–110)
HCT VFR BLD AUTO: 27.9 % (ref 37–48.5)
HGB BLD-MCNC: 9.7 G/DL (ref 12–16)
IMM GRANULOCYTES # BLD AUTO: 0.02 K/UL (ref 0–0.04)
IMM GRANULOCYTES NFR BLD AUTO: 0.4 % (ref 0–0.5)
LYMPHOCYTES # BLD AUTO: 2 K/UL (ref 1–4.8)
LYMPHOCYTES NFR BLD: 39.1 % (ref 18–48)
MCH RBC QN AUTO: 31.1 PG (ref 27–31)
MCHC RBC AUTO-ENTMCNC: 34.8 G/DL (ref 32–36)
MCV RBC AUTO: 89 FL (ref 82–98)
MONOCYTES # BLD AUTO: 0.5 K/UL (ref 0.3–1)
MONOCYTES NFR BLD: 10 % (ref 4–15)
NEUTROPHILS # BLD AUTO: 2.5 K/UL (ref 1.8–7.7)
NEUTROPHILS NFR BLD: 48.4 % (ref 38–73)
NRBC BLD-RTO: 0 /100 WBC
PLATELET # BLD AUTO: 138 K/UL (ref 150–450)
PMV BLD AUTO: 10.3 FL (ref 9.2–12.9)
POTASSIUM SERPL-SCNC: 3.6 MMOL/L (ref 3.5–5.1)
PROT SERPL-MCNC: 5.4 G/DL (ref 6–8.4)
RBC # BLD AUTO: 3.12 M/UL (ref 4–5.4)
SODIUM SERPL-SCNC: 145 MMOL/L (ref 136–145)
STOOL CULTURE: NORMAL
STOOL CULTURE: NORMAL
WBC # BLD AUTO: 5.19 K/UL (ref 3.9–12.7)

## 2023-12-12 PROCEDURE — 36415 COLL VENOUS BLD VENIPUNCTURE: CPT | Performed by: STUDENT IN AN ORGANIZED HEALTH CARE EDUCATION/TRAINING PROGRAM

## 2023-12-12 PROCEDURE — 25000003 PHARM REV CODE 250: Performed by: STUDENT IN AN ORGANIZED HEALTH CARE EDUCATION/TRAINING PROGRAM

## 2023-12-12 PROCEDURE — 80053 COMPREHEN METABOLIC PANEL: CPT | Performed by: STUDENT IN AN ORGANIZED HEALTH CARE EDUCATION/TRAINING PROGRAM

## 2023-12-12 PROCEDURE — 63600175 PHARM REV CODE 636 W HCPCS: Performed by: STUDENT IN AN ORGANIZED HEALTH CARE EDUCATION/TRAINING PROGRAM

## 2023-12-12 PROCEDURE — 85025 COMPLETE CBC W/AUTO DIFF WBC: CPT | Performed by: STUDENT IN AN ORGANIZED HEALTH CARE EDUCATION/TRAINING PROGRAM

## 2023-12-12 RX ORDER — ENOXAPARIN SODIUM 100 MG/ML
40 INJECTION SUBCUTANEOUS EVERY 24 HOURS
Status: DISCONTINUED | OUTPATIENT
Start: 2023-12-12 | End: 2023-12-12 | Stop reason: HOSPADM

## 2023-12-12 RX ADMIN — METRONIDAZOLE 500 MG: 5 INJECTION, SOLUTION INTRAVENOUS at 09:12

## 2023-12-12 RX ADMIN — LEVOTHYROXINE SODIUM 75 MCG: 0.03 TABLET ORAL at 05:12

## 2023-12-12 RX ADMIN — METRONIDAZOLE 500 MG: 5 INJECTION, SOLUTION INTRAVENOUS at 01:12

## 2023-12-12 RX ADMIN — ATORVASTATIN CALCIUM 40 MG: 40 TABLET, FILM COATED ORAL at 09:12

## 2023-12-12 RX ADMIN — CEFTRIAXONE SODIUM 1 G: 1 INJECTION, POWDER, FOR SOLUTION INTRAMUSCULAR; INTRAVENOUS at 12:12

## 2023-12-12 RX ADMIN — SODIUM CHLORIDE: 0.9 INJECTION, SOLUTION INTRAVENOUS at 09:12

## 2023-12-12 NOTE — NURSING
patient is complaining of new right eye pressure/pain and slightly fuzzy vision. Last /57 HR 52. Pt did have a headache earlier that has since subsided. Notified Dr Salcedo. No new orders at this time.

## 2023-12-12 NOTE — NURSING
PIV removed, direct pressure, gauze, and tape applied. Discharge instructions given and reviewed, all questions answered. Patient d/c home. All belongings sent with patient.

## 2023-12-12 NOTE — PLAN OF CARE
Chart and orders reviewed. Pt discharging home with no post acute services. Hospital follow up scheduled and added to AVS. Pt has no other needs to be addressed at this time. Pt cleared to discharge from a case management standpoint.       12/12/23 1632   Final Note   Assessment Type Final Discharge Note   Anticipated Discharge Disposition Home   What phone number can be called within the next 1-3 days to see how you are doing after discharge? [phone]   Hospital Resources/Appts/Education Provided Provided patient/caregiver with written discharge plan information;Appointments scheduled and added to AVS   Post-Acute Status   Coverage Payor:   [employer] MANAGED MEDICARE - [employer] MED[employer]ARE PPO -   Discharge Delays None known at this time

## 2023-12-12 NOTE — PROGRESS NOTES
Pharmacist Renal Dose Adjustment Note    Darshana Alicea is a 73 y.o. female being treated with the medication enoxaparin    Patient Data:    Vital Signs (Most Recent):  Temp: 97.8 °F (36.6 °C) (12/12/23 0726)  Pulse: (!) 55 (12/12/23 0756)  Resp: 18 (12/12/23 0756)  BP: (!) 164/56 (12/12/23 0726)  SpO2: 98 % (12/12/23 0756) Vital Signs (72h Range):  Temp:  [97.4 °F (36.3 °C)-98.2 °F (36.8 °C)]   Pulse:  [43-91]   Resp:  [16-18]   BP: ()/(52-70)   SpO2:  [97 %-100 %]      Recent Labs   Lab 12/10/23  1157 12/11/23  0607 12/12/23  0442   CREATININE 2.0* 1.3 1.0     Serum creatinine: 1 mg/dL 12/12/23 0442  Estimated creatinine clearance: 52.7 mL/min    Enoxaparin 30 mg every 24 hour will be changed to enoxaparin 40 mg every 24 hour    Pharmacist's Name: Katie Costa  Pharmacist's Extension: [phone]

## 2023-12-13 LAB
O+P SPEC MICRO: NORMAL
O+P STL CONC: NORMAL

## 2023-12-22 NOTE — DISCHARGE SUMMARY
[facility]  Discharge Summary  Patient Name: Darshana Alicea MRN: [id number]   Patient Class: IP- Inpatient  Length of Stay: 2   Admission Date: 12/9/2023  8:34 PM Attending Physician: Cyrus Salcedo MD   Primary Care Provider: Ximena Lima NP Face-to-Face encounter date: 12/12/23   Chief Complaint: Vomiting (Vomiting and diarrhea x 4 days. )    Date of Discharge: 12/12/23  Discharge Disposition:Home or Self Care   Condition: Stable       Reason for Hospitalization     Active Hospital Problems    Diagnosis    *Diarrhea    HERBERTH (acute kidney injury)    Hypothyroidism    Chest pain    Electrolyte abnormality         Brief History of Present Illness       Ms. Alicea is a 73 year old woman with PMHx of HTN, Hypothyroidism- came to ER with 4 days hx of vomiting/non-bloody diarrhea- which she thinks she got it from her granddaughter- she was not able to keep anything down for past 4 days and she is also peeing less lately- she had substernal pressure like chest pain this morning which she says she has angina diagnosed by her doctor she underwent NST this year which as per her is normal. Otherwise no fever, chills.   Pt's  at bedside saying now he thinks he got it from her.      Ct scan reviewed in ER and discussed with ER physician.     For the full HPI please refer to the History & Physical from this admission.    Hospital Course By Problem with Pertinent Findings       Diarrhea  Likely viral- colitis/enteritis- sent stool studies  C/w IVF and Rocephin/Flagyl   Anti-emetics prn  Advance diet as tolerated  Stool studies negative so far  Symptoms improved  Ok for discharge        Electrolyte abnormality  Replete and daily BMP        Chest pain  Trend troponin  EKG         Hypothyroidism  C/w home meds        HERBERTH (acute kidney injury), improved  HERBERTH on CKD  C/w IVF   Daily BMP  Improved with IVF    Patient was seen and examined on the date of discharge and determined to be suitable for  "discharge.    Physical Exam  BP (!) 154/57   Pulse (!) 52   Temp 97.9 °F (36.6 °C) (Oral)   Resp 18   Ht 5' 4" (1.626 m)   Wt 82 kg (180 lb 12.4 oz)   SpO2 99%   BMI 31.03 kg/m²   Vitals reviewed.    Constitutional: No distress.   HENT: Atraumatic.   Cardiovascular: Normal rate, regular rhythm.  S1 S2.    Pulmonary/Chest: Effort normal. Clear to auscultation bilaterally. No wheezes.   Abdominal: Soft. Bowel sounds are normal. Exhibits no distension and no mass. No tenderness  Neurological: Alert.   Skin: Skin is warm and dry.     Following labs were Reviewed   No results for input(s): "WBC", "HGB", "HCT", "PLT", "GLUCOSE", "CALCIUM", "ALBUMIN", "PROT", "NA", "K", "CO2", "CL", "BUN", "CREATININE", "ALKPHOS", "ALT", "AST", "BILITOT" in the last 24 hours.  No results found for: "POCTGLUCOSE"     All labs within the past 24 hours have been reviewed    Microbiology Results (last 7 days)       Procedure Component Value Units Date/Time    Stool culture [[id number]] Collected: 12/10/23 1220    Order Status: Completed Specimen: Stool Updated: 12/12/23 0828     Stool Culture No Salmonella,Shigella,Vibrio,Campylobacter.      No E coli 0157:H7 isolated.    Urine culture [[id number]] Collected: 12/09/23 2136    Order Status: Completed Specimen: Urine Updated: 12/12/23 0655     Urine Culture, Routine No growth    Narrative:      Specimen Source->Urine    Clostridium difficile EIA [[id number]] Collected: 12/10/23 1220    Order Status: Completed Specimen: Stool Updated: 12/10/23 1506     C. diff Antigen Negative     C difficile Toxins A+B, EIA Negative     Comment: Testing not recommended for children <24 months old.       Respiratory Infection Panel (PCR), Nasopharyngeal [[id number]] Collected: 12/09/23 2136    Order Status: Completed Updated: 12/10/23 0020     Respiratory Infection Panel Source NP swab     Adenovirus Not Detected     Coronavirus 229E, Common Cold Virus Not Detected     Coronavirus HKU1, Common Cold Virus Not " Detected     Coronavirus NL63, Common Cold Virus Not Detected     Coronavirus OC43, Common Cold Virus Not Detected     Comment: Coronavirus strains 229E, HKU1, NL63, and OC43 can cause the common   cold   and are not associated with the respiratory disease outbreak caused   by  the COVID-19 (SARS-CoV-2 novel Coronavirus) strain.           SARS-CoV2 (COVID-19) Qualitative PCR Not Detected     Human Metapneumovirus Not Detected     Human Rhinovirus/Enterovirus Not Detected     Influenza A (subtypes H1, H1-2009,H3) Not Detected     Influenza B Not Detected     Parainfluenza Virus 1 Not Detected     Parainfluenza Virus 2 Not Detected     Parainfluenza Virus 3 Not Detected     Parainfluenza Virus 4 Not Detected     Respiratory Syncytial Virus Not Detected     Bordetella Parapertussis (NI7469) Not Detected     Bordetella pertussis (ptxP) Not Detected     Chlamydia pneumoniae Not Detected     Mycoplasma pneumoniae Not Detected     Comment: Respiratory Infection Panel testing performed by Multiplex PCR.       Narrative:      Specimen Source->Nasopharyngeal Swab    Resp Viral Panel PCR, Peds Under 7 Months Nasopharyngeal Swab [[id number]] Collected: 12/09/23 2136    Order Status: Canceled     Resp Viral Panel PCR, Peds Under 7 Months Nasopharyngeal Swab [[id number]]     Order Status: Canceled           CT Abdomen Pelvis  Without Contrast   Final Result          No results found for this or any previous visit.      Consultants and Procedures   Consultants:      Procedures:   * No surgery found *     Discharge Information:   Diet:  Resume Cardiac diet/Diabetic Diet    Physical Activity:  Activity as tolerated    Instructions:  1. Take all medications as prescribed  2. Keep all follow-up appointments  3. Return to the hospital or call your primary care physicians if any worsening symptoms such as chest pain, shortness of breath, bleeding,  intractable pain, fever >101 occur.      Follow-Up Appointments:  Please call your primary  care physician to schedule an appointment in 1 week time.     Follow-up Information       Ximena Lima NP Follow up in 1 week(s).    Specialty: Family Medicine  Contact information:  [address] [place] [address] [place] [address] [phone]                               Pending laboratory work/Tests to be performed/followed by the Primary care Physician:    The patient was discharged with discharge instructions reviewed in written and verbal form. All questions were answered and prescriptions were provided. The importance of making follow up appointments and compliance of medications has been emphasized. The patient will follow up in 1 week or sooner as needed with the PCP. Tthe patient understands and agrees with the plan. Upon discharge, patient needs to be on following medications.    Discharge Medications:     Medication List        CONTINUE taking these medications      amLODIPine-benazepriL 5-40 mg per capsule  Commonly known as: LOTREL     ciclopirox 8 % Soln  Commonly known as: PENLAC     ergocalciferol 50,000 unit Cap  Commonly known as: ERGOCALCIFEROL     hydroCHLOROthiazide 12.5 mg capsule  Commonly known as: MICROZIDE     levothyroxine 75 MCG tablet  Commonly known as: SYNTHROID     nitroGLYCERIN 0.4 MG SL tablet  Commonly known as: NITROSTAT     ondansetron 4 MG Tbdl  Commonly known as: ZOFRAN-ODT  Take 1 tablet (4 mg total) by mouth every 6 (six) hours as needed (N/V).     pramipexole 0.5 MG tablet  Commonly known as: MIRAPEX     rosuvastatin 20 MG tablet  Commonly known as: CRESTOR     solifenacin 10 MG tablet  Commonly known as: VESICARE  Take 1 tablet (10 mg total) by mouth once daily.     traZODone 50 MG tablet  Commonly known as: DESYREL            STOP taking these medications      furosemide 20 MG tablet  Commonly known as: LASIX     traMADoL 50 mg tablet  Commonly known as: ULTRAM            ASK your doctor about these medications      ciprofloxacin HCl 500 MG tablet  Commonly known  as: CIPRO  Take 1 tablet (500 mg total) by mouth 2 (two) times daily. for 7 days     metroNIDAZOLE 500 MG tablet  Commonly known as: FLAGYL  Take 1 tablet (500 mg total) by mouth 3 (three) times daily. for 7 days     promethazine 25 MG tablet  Commonly known as: PHENERGAN  Take 1 tablet (25 mg total) by mouth every 6 (six) hours as needed for Nausea.                I spent 31 minutes preparing the discharge for this patient including reviewing records from previous encounters, preparation of discharge summary, assessing and final examination of the patient, discharge medicine reconciliation, discussing plan of care, follow up and education and prescriptions.       Cyrus Salcedo  [facility] Hospitalist

## 2024-02-15 DIAGNOSIS — G47.33 OBSTRUCTIVE SLEEP APNEA: Primary | ICD-10-CM

## 2024-02-15 DIAGNOSIS — G47.19 EXCESSIVE DAYTIME SLEEPINESS: ICD-10-CM

## 2024-02-15 DIAGNOSIS — R06.83 SNORING: ICD-10-CM

## 2024-02-15 DIAGNOSIS — R53.83 FATIGUE: ICD-10-CM

## 2024-03-06 ENCOUNTER — PROCEDURE VISIT (OUTPATIENT)
Dept: SLEEP MEDICINE | Facility: HOSPITAL | Age: 74
End: 2024-03-06
Attending: INTERNAL MEDICINE
Payer: MEDICARE

## 2024-03-06 DIAGNOSIS — R53.83 FATIGUE: ICD-10-CM

## 2024-03-06 DIAGNOSIS — G47.19 EXCESSIVE DAYTIME SLEEPINESS: ICD-10-CM

## 2024-03-06 DIAGNOSIS — G47.33 OBSTRUCTIVE SLEEP APNEA: ICD-10-CM

## 2024-03-06 DIAGNOSIS — R06.83 SNORING: ICD-10-CM

## 2024-03-06 PROCEDURE — 95810 POLYSOM 6/> YRS 4/> PARAM: CPT

## 2024-03-11 PROBLEM — N17.9 AKI (ACUTE KIDNEY INJURY): Status: RESOLVED | Noted: 2023-12-10 | Resolved: 2024-03-11

## 2024-03-13 ENCOUNTER — OFFICE VISIT (OUTPATIENT)
Dept: UROLOGY | Facility: CLINIC | Age: 74
End: 2024-03-13
Payer: MEDICARE

## 2024-03-13 VITALS — HEIGHT: 64 IN | BODY MASS INDEX: 30.73 KG/M2 | WEIGHT: 180 LBS

## 2024-03-13 DIAGNOSIS — N32.81 OAB (OVERACTIVE BLADDER): Primary | ICD-10-CM

## 2024-03-13 PROCEDURE — 1159F MED LIST DOCD IN RCRD: CPT | Mod: CPTII,S$GLB,, | Performed by: NURSE PRACTITIONER

## 2024-03-13 PROCEDURE — G2211 COMPLEX E/M VISIT ADD ON: HCPCS | Mod: S$GLB,,, | Performed by: NURSE PRACTITIONER

## 2024-03-13 PROCEDURE — 1160F RVW MEDS BY RX/DR IN RCRD: CPT | Mod: CPTII,S$GLB,, | Performed by: NURSE PRACTITIONER

## 2024-03-13 PROCEDURE — 99214 OFFICE O/P EST MOD 30 MIN: CPT | Mod: S$GLB,,, | Performed by: NURSE PRACTITIONER

## 2024-03-13 PROCEDURE — 1101F PT FALLS ASSESS-DOCD LE1/YR: CPT | Mod: CPTII,S$GLB,, | Performed by: NURSE PRACTITIONER

## 2024-03-13 PROCEDURE — 99999 PR PBB SHADOW E&M-EST. PATIENT-LVL III: CPT | Mod: PBBFAC,,, | Performed by: NURSE PRACTITIONER

## 2024-03-13 PROCEDURE — 3008F BODY MASS INDEX DOCD: CPT | Mod: CPTII,S$GLB,, | Performed by: NURSE PRACTITIONER

## 2024-03-13 PROCEDURE — 1126F AMNT PAIN NOTED NONE PRSNT: CPT | Mod: CPTII,S$GLB,, | Performed by: NURSE PRACTITIONER

## 2024-03-13 PROCEDURE — 3288F FALL RISK ASSESSMENT DOCD: CPT | Mod: CPTII,S$GLB,, | Performed by: NURSE PRACTITIONER

## 2024-03-13 NOTE — PATIENT INSTRUCTIONS
Conservative measures to control urgency and frequency including   1. Avoiding/minimizing bladder irritants (see below), especially in afternoon and evening hours    Discussed bladder irritants include coffee (even decaf), tea, alcohol, soda, spicy foods, acidic juices (orange, tomato), vinegar, and artificial sweeteners/sugary beverages.    2. timed voiding - empty on a schedule (approx ~2-3 hours) in spite of need to urinate, to get ahead of urge    3. dont postponing voiding - dont hold it on purpose     4. bowel regimen as distended bowel has extrinsic compressive effect on bladder.   - any or all of the following in any combination, titrate to soft daily bowel movement without pushing or straining  - colace/stool softener capsule - once to twice daily  - miralax - 1 capful daily to start, can increase to 2x daily (or decrease to 1/2 cap daily)  - increase dietary fibery  - fiber supplements, such as metamucil  - prunes, prune juice    5. INCREASE water intake    6. Stop fluids 2 hours before bed, and urinate just before bed

## 2024-03-13 NOTE — PROGRESS NOTES
[facility]  Group: Felix/Avelina/Kisha/Timmy  NPs: Dilcia Irby/Jessica Contreras    Note today written by:Jessica Contreras  Date of Service: 03/13/2024      CHIEF COMPLAINT: F/u OAB.    PRESENTING ILLNESS:    Darshana Alicea is a 73 y.o. female who presents for f/u OAB. Last clinic visit was 11/15/23 with Dr Beard    3/13/24  On vesicare 10 mg  PVR 83 ml  Pt reports nocturia improved to 2-3 x per night but still bothersome  Had sleep study 3/6/24 and has follow up next month with MD to review  She only voids 3 x per day. + Urgency. Denies UUI  Denies dysuria, gross hematuria, flank pain, fever, chills, nausea or vomiting  Constipation controlled with colace      11/15/23   She has a remote hx of stones.   She has seen Dr. Cárdenas in August 2022 for microscopic hematuria and underwent cysto/RGP which was normal per op note. CT scan also normal.   Was being considered for an Interstim. Did a 3 day voiding study but never heard back with results.   No gross hematuria.      She was on Detrol 2 mg BID. This stopped working and now she is on Vesicare 10 mg, just started this.      Today complaining of nocturia x 3-5. She does snore at night. Had a sleep study > 10 years ago which was reportedly normal.   During the day has some frequency but she is on a diuretic which she takes in the morning.    No incontinence.   She does have some urgency.      She does have issues with constipation. Started taking stool softener and now is having daily bowel movements.   Drinks 3 bottles of water and 1 coke.      Hx of stones treated with ESWL by Dr. Gomez.      UA today: negative for blood, leuks, nitrite   PVR today: 20 cc     Last urine culture: no documented UTIs     Family  hx: no  malignancy   Hx of CKD       REVIEW OF SYSTEMS: as stated above in hpi    PATIENT HISTORY:  Past Medical History:   Diagnosis Date    Anemia     Colon polyp     Hypothyroidism  12/10/2023    Kidney stone     Kidney stones 10/29/2012    Thyroid disease        Past Surgical History:   Procedure Laterality Date    BLADDER SURGERY      CHOLECYSTECTOMY      CYSTOSCOPY      HYSTERECTOMY      OOPHORECTOMY      tonsillectomy   1964       Allergies:  Lyrica [pregabalin], Sulfa (sulfonamide antibiotics), and Toradol [ketorolac]      PHYSICAL EXAMINATION:  Constitutional: She is oriented to person, place, and time. She appears well-developed and well-nourished.  She is in no apparent distress.  Abdominal:  She exhibits no distension.  There is no CVA tenderness.   Neurological: She is alert and oriented to person, place, and time.   Psych: Cooperative with normal affect.      Physical Exam    LABS:      Lab Results   Component Value Date    CREATININE 1.0 12/12/2023     Lab Results   Component Value Date    HGBA1C 6.0 12/10/2023       IMPRESSION:    Encounter Diagnoses   Name Primary?    OAB (overactive bladder) Yes       PLAN:  -Continue vesicare 10 mg. No refills needed  -Pt will notify me or Dr Beard regarding sleep study results.   If + LORENZO, that could be contributing to nocturia. Recommend treatment for LORENZO first before adjusting medication.  If negative for LORENZO, plan to start myrbetriq or gemtesa   If oral medications are not effective and no LORENZO, can consider interstim    -Encouraged pt to start voiding every 2-3 hours regardless of urge. Do not hold once urge occurs. She is only voiding 3 x per day which could be contributing to urgency    -Continue conservative measures to control urgency and frequency including   1. Avoiding/minimizing bladder irritants (see below), especially in afternoon and evening hours  Discussed bladder irritants include coffee (even decaf), tea, alcohol, soda, spicy foods, acidic juices (orange, tomato), vinegar, and artificial sweeteners/sugary beverages.  2. timed voiding - empty on a schedule (approx ~2-3 hours) in spite of need to urinate, to get ahead of  urge  3. dont postponing voiding - dont hold it on purpose   4. bowel regimen as distended bowel has extrinsic compressive effect on bladder.   - any or all of the following in any combination, titrate to soft daily bowel movement without pushing or straining  - colace/stool softener capsule - once to twice daily  - miralax - 1 capful daily to start, can increase to 2x daily (or decrease to 1/2 cap daily)  - increase dietary fibery  - fiber supplements, such as metamucil  - prunes, prune juice  5. INCREASE water intake  6. Stop fluids 2 hours before bed, and urinate just before bed    -RTC 3 months      I encouraged her or any of her family members to call or email me with questions and/or concerns.    Visit today is associated with current or anticipated ongoing medical care related to this patient's single serious condition/complex condition.      30 minutes of total time spent on the encounter, which includes face to face time and non-face to face time preparing to see the patient (eg, review of tests), Obtaining and/or reviewing separately obtained history, Documenting clinical information in the electronic or other health record, Independently interpreting results (not separately reported) and communicating results to the patient/family/caregiver, or Care coordination (not separately reported).

## 2024-03-21 DIAGNOSIS — G47.33 OBSTRUCTIVE SLEEP APNEA: Primary | ICD-10-CM

## 2024-03-21 DIAGNOSIS — R53.83 FATIGUE: ICD-10-CM

## 2024-03-21 DIAGNOSIS — G47.19 EXCESSIVE DAYTIME SLEEPINESS: ICD-10-CM

## 2024-03-21 DIAGNOSIS — R06.83 SNORING: ICD-10-CM

## 2024-04-09 ENCOUNTER — PROCEDURE VISIT (OUTPATIENT)
Dept: SLEEP MEDICINE | Facility: HOSPITAL | Age: 74
End: 2024-04-09
Attending: INTERNAL MEDICINE
Payer: MEDICARE

## 2024-04-09 DIAGNOSIS — R06.83 SNORING: ICD-10-CM

## 2024-04-09 DIAGNOSIS — G47.33 OBSTRUCTIVE SLEEP APNEA: ICD-10-CM

## 2024-04-09 DIAGNOSIS — R53.83 FATIGUE: ICD-10-CM

## 2024-04-09 DIAGNOSIS — G47.19 EXCESSIVE DAYTIME SLEEPINESS: ICD-10-CM

## 2024-04-09 PROCEDURE — 95811 POLYSOM 6/>YRS CPAP 4/> PARM: CPT

## 2024-04-29 ENCOUNTER — TELEPHONE (OUTPATIENT)
Dept: FAMILY MEDICINE | Facility: CLINIC | Age: 74
End: 2024-04-29
Payer: MEDICARE

## 2024-04-29 ENCOUNTER — PATIENT MESSAGE (OUTPATIENT)
Dept: FAMILY MEDICINE | Facility: CLINIC | Age: 74
End: 2024-04-29
Payer: MEDICARE

## 2024-05-09 ENCOUNTER — HOSPITAL ENCOUNTER (INPATIENT)
Facility: HOSPITAL | Age: 74
LOS: 4 days | Discharge: HOME | DRG: 287 | End: 2024-05-14
Attending: EMERGENCY MEDICINE | Admitting: INTERNAL MEDICINE
Payer: MEDICARE

## 2024-05-09 DIAGNOSIS — R07.9 CHEST PAIN: ICD-10-CM

## 2024-05-09 DIAGNOSIS — I16.0 HYPERTENSIVE URGENCY: ICD-10-CM

## 2024-05-09 DIAGNOSIS — R07.9 CHEST PAIN, UNSPECIFIED TYPE: Primary | ICD-10-CM

## 2024-05-09 LAB
ALBUMIN SERPL BCP-MCNC: 4.3 G/DL (ref 3.5–5.2)
ALP SERPL-CCNC: 43 U/L (ref 55–135)
ALT SERPL W/O P-5'-P-CCNC: 13 U/L (ref 10–44)
ANION GAP SERPL CALC-SCNC: 6 MMOL/L (ref 8–16)
AST SERPL-CCNC: 15 U/L (ref 10–40)
BASOPHILS # BLD AUTO: 0.03 K/UL (ref 0–0.2)
BASOPHILS NFR BLD: 0.4 % (ref 0–1.9)
BILIRUB SERPL-MCNC: 0.8 MG/DL (ref 0.1–1)
BILIRUB UR QL STRIP: NEGATIVE
BNP SERPL-MCNC: 31 PG/ML (ref 0–99)
BUN SERPL-MCNC: 14 MG/DL (ref 8–23)
CALCIUM SERPL-MCNC: 8.7 MG/DL (ref 8.7–10.5)
CHLORIDE SERPL-SCNC: 108 MMOL/L (ref 95–110)
CLARITY UR: CLEAR
CO2 SERPL-SCNC: 28 MMOL/L (ref 23–29)
COLOR UR: YELLOW
CREAT SERPL-MCNC: 0.9 MG/DL (ref 0.5–1.4)
DIFFERENTIAL METHOD BLD: ABNORMAL
EOSINOPHIL # BLD AUTO: 0.2 K/UL (ref 0–0.5)
EOSINOPHIL NFR BLD: 2.8 % (ref 0–8)
ERYTHROCYTE [DISTWIDTH] IN BLOOD BY AUTOMATED COUNT: 13.7 % (ref 11.5–14.5)
EST. GFR  (NO RACE VARIABLE): >60 ML/MIN/1.73 M^2
GLUCOSE SERPL-MCNC: 78 MG/DL (ref 70–110)
GLUCOSE UR QL STRIP: NEGATIVE
HCT VFR BLD AUTO: 33.5 % (ref 37–48.5)
HGB BLD-MCNC: 11.2 G/DL (ref 12–16)
HGB UR QL STRIP: NEGATIVE
IMM GRANULOCYTES # BLD AUTO: 0.02 K/UL (ref 0–0.04)
IMM GRANULOCYTES NFR BLD AUTO: 0.3 % (ref 0–0.5)
KETONES UR QL STRIP: NEGATIVE
LEUKOCYTE ESTERASE UR QL STRIP: NEGATIVE
LYMPHOCYTES # BLD AUTO: 1.7 K/UL (ref 1–4.8)
LYMPHOCYTES NFR BLD: 24.1 % (ref 18–48)
MCH RBC QN AUTO: 30.7 PG (ref 27–31)
MCHC RBC AUTO-ENTMCNC: 33.4 G/DL (ref 32–36)
MCV RBC AUTO: 92 FL (ref 82–98)
MONOCYTES # BLD AUTO: 0.8 K/UL (ref 0.3–1)
MONOCYTES NFR BLD: 11.2 % (ref 4–15)
NEUTROPHILS # BLD AUTO: 4.4 K/UL (ref 1.8–7.7)
NEUTROPHILS NFR BLD: 61.2 % (ref 38–73)
NITRITE UR QL STRIP: NEGATIVE
NRBC BLD-RTO: 0 /100 WBC
PH UR STRIP: 6 [PH] (ref 5–8)
PLATELET # BLD AUTO: 178 K/UL (ref 150–450)
PMV BLD AUTO: 10.2 FL (ref 9.2–12.9)
POTASSIUM SERPL-SCNC: 3.7 MMOL/L (ref 3.5–5.1)
PROT SERPL-MCNC: 6.8 G/DL (ref 6–8.4)
PROT UR QL STRIP: NEGATIVE
RBC # BLD AUTO: 3.65 M/UL (ref 4–5.4)
SODIUM SERPL-SCNC: 142 MMOL/L (ref 136–145)
SP GR UR STRIP: 1.02 (ref 1–1.03)
TROPONIN I SERPL HS-MCNC: 2.6 PG/ML (ref 0–14.9)
TROPONIN I SERPL HS-MCNC: 3.1 PG/ML (ref 0–14.9)
URN SPEC COLLECT METH UR: ABNORMAL
UROBILINOGEN UR STRIP-ACNC: ABNORMAL EU/DL
WBC # BLD AUTO: 7.15 K/UL (ref 3.9–12.7)

## 2024-05-09 PROCEDURE — 84484 ASSAY OF TROPONIN QUANT: CPT | Mod: 91 | Performed by: NURSE PRACTITIONER

## 2024-05-09 PROCEDURE — 93005 ELECTROCARDIOGRAM TRACING: CPT | Performed by: GENERAL PRACTICE

## 2024-05-09 PROCEDURE — 93010 ELECTROCARDIOGRAM REPORT: CPT | Mod: ,,, | Performed by: GENERAL PRACTICE

## 2024-05-09 PROCEDURE — 63600175 PHARM REV CODE 636 W HCPCS: Performed by: EMERGENCY MEDICINE

## 2024-05-09 PROCEDURE — 81003 URINALYSIS AUTO W/O SCOPE: CPT | Performed by: EMERGENCY MEDICINE

## 2024-05-09 PROCEDURE — 85025 COMPLETE CBC W/AUTO DIFF WBC: CPT | Performed by: NURSE PRACTITIONER

## 2024-05-09 PROCEDURE — 84484 ASSAY OF TROPONIN QUANT: CPT | Performed by: EMERGENCY MEDICINE

## 2024-05-09 PROCEDURE — 96374 THER/PROPH/DIAG INJ IV PUSH: CPT

## 2024-05-09 PROCEDURE — 96375 TX/PRO/DX INJ NEW DRUG ADDON: CPT

## 2024-05-09 PROCEDURE — 80053 COMPREHEN METABOLIC PANEL: CPT | Performed by: NURSE PRACTITIONER

## 2024-05-09 PROCEDURE — 99285 EMERGENCY DEPT VISIT HI MDM: CPT | Mod: 25

## 2024-05-09 PROCEDURE — 83880 ASSAY OF NATRIURETIC PEPTIDE: CPT | Performed by: EMERGENCY MEDICINE

## 2024-05-09 PROCEDURE — 25000003 PHARM REV CODE 250: Performed by: EMERGENCY MEDICINE

## 2024-05-09 RX ORDER — ONDANSETRON HYDROCHLORIDE 2 MG/ML
4 INJECTION, SOLUTION INTRAVENOUS
Status: COMPLETED | OUTPATIENT
Start: 2024-05-09 | End: 2024-05-09

## 2024-05-09 RX ORDER — AMLODIPINE BESYLATE 5 MG/1
5 TABLET ORAL
Status: COMPLETED | OUTPATIENT
Start: 2024-05-09 | End: 2024-05-09

## 2024-05-09 RX ORDER — MORPHINE SULFATE 2 MG/ML
2 INJECTION, SOLUTION INTRAMUSCULAR; INTRAVENOUS
Status: COMPLETED | OUTPATIENT
Start: 2024-05-09 | End: 2024-05-09

## 2024-05-09 RX ADMIN — MORPHINE SULFATE 2 MG: 2 INJECTION, SOLUTION INTRAMUSCULAR; INTRAVENOUS at 09:05

## 2024-05-09 RX ADMIN — AMLODIPINE BESYLATE 5 MG: 5 TABLET ORAL at 09:05

## 2024-05-09 RX ADMIN — ONDANSETRON 4 MG: 2 INJECTION INTRAMUSCULAR; INTRAVENOUS at 09:05

## 2024-05-09 NOTE — FIRST PROVIDER EVALUATION
Emergency Department TeleTriage Encounter Note      CHIEF COMPLAINT    Chief Complaint   Patient presents with    Chest Pain     Left sided CP radiating to left arm that started last night. Denies . Endorses nausea and h/a.Sent by Brunilda Pascual NP     Nausea    Headache       VITAL SIGNS   Initial Vitals [24 1436]   BP Pulse Resp Temp SpO2   (!) 156/76 71 20 98.3 °F (36.8 °C) 97 %      MAP       --            ALLERGIES    Review of patient's allergies indicates:   Allergen Reactions    Lyrica [pregabalin] Swelling    Sulfa (sulfonamide antibiotics) Swelling    Toradol [ketorolac] Swelling       PROVIDER TRIAGE NOTE  73 year old female presents to the ER with complaints of chest pain into L arm with headache and elevated BP since last night. Hx of angina. PCP told her to come to the ER. Reports mild CP currently. Improved from yesterday.    AAOx3, respirations even and non- labored, stable vitals, normal coloration of skin, sitting upright in triage chair, appears in no acute distress.          ORDERS  Labs Reviewed - No data to display    ED Orders (720h ago, onward)      None              Virtual Visit Note: The provider triage portion of this emergency department evaluation and documentation was performed via [employer], a HIPAA-compliant telemedicine application, in concert with a tele-presenter in the room. A face to face patient evaluation with one of my colleagues will occur once the patient is placed in an emergency department room.      DISCLAIMER: This note was prepared with [email or web address] voice recognition transcription software. Garbled syntax, mangled pronouns, and other bizarre constructions may be attributed to that software system.

## 2024-05-09 NOTE — Clinical Note
Radial flush given by . Radial flush consists of: 2.5 mg verapamil, 3,000 units heparin, 200 mcg nitroglycerin, and 5 cc NS flush.

## 2024-05-10 ENCOUNTER — CLINICAL SUPPORT (OUTPATIENT)
Dept: CARDIOLOGY | Facility: HOSPITAL | Age: 74
DRG: 287 | End: 2024-05-10
Attending: INTERNAL MEDICINE
Payer: MEDICARE

## 2024-05-10 VITALS — WEIGHT: 195 LBS | BODY MASS INDEX: 33.29 KG/M2 | HEIGHT: 64 IN

## 2024-05-10 PROBLEM — I16.0 HYPERTENSIVE URGENCY: Status: ACTIVE | Noted: 2024-05-10

## 2024-05-10 PROBLEM — G89.29 CHRONIC RIGHT FLANK PAIN: Status: ACTIVE | Noted: 2024-05-10

## 2024-05-10 PROBLEM — R10.9 CHRONIC RIGHT FLANK PAIN: Status: ACTIVE | Noted: 2024-05-10

## 2024-05-10 LAB
ALBUMIN SERPL BCP-MCNC: 3.8 G/DL (ref 3.5–5.2)
ALP SERPL-CCNC: 41 U/L (ref 55–135)
ALT SERPL W/O P-5'-P-CCNC: 25 U/L (ref 10–44)
ANION GAP SERPL CALC-SCNC: 4 MMOL/L (ref 8–16)
AORTIC ROOT ANNULUS: 2.7 CM
AORTIC VALVE CUSP SEPERATION: 1.9 CM
AST SERPL-CCNC: 34 U/L (ref 10–40)
AV INDEX (PROSTH): 0.79
AV MEAN GRADIENT: 9 MMHG
AV PEAK GRADIENT: 16 MMHG
AV VALVE AREA BY VELOCITY RATIO: 2.54 CM²
AV VALVE AREA: 2.49 CM²
AV VELOCITY RATIO: 0.81
BASOPHILS # BLD AUTO: 0.04 K/UL (ref 0–0.2)
BASOPHILS NFR BLD: 0.4 % (ref 0–1.9)
BILIRUB SERPL-MCNC: 0.8 MG/DL (ref 0.1–1)
BSA FOR ECHO PROCEDURE: 2 M2
BUN SERPL-MCNC: 17 MG/DL (ref 8–23)
CALCIUM SERPL-MCNC: 8.5 MG/DL (ref 8.7–10.5)
CHLORIDE SERPL-SCNC: 108 MMOL/L (ref 95–110)
CO2 SERPL-SCNC: 29 MMOL/L (ref 23–29)
CREAT SERPL-MCNC: 1 MG/DL (ref 0.5–1.4)
CV ECHO LV RWT: 0.6 CM
DIFFERENTIAL METHOD BLD: ABNORMAL
DOP CALC AO PEAK VEL: 2.03 M/S
DOP CALC AO VTI: 43.1 CM
DOP CALC LVOT AREA: 3.1 CM2
DOP CALC LVOT DIAMETER: 2 CM
DOP CALC LVOT PEAK VEL: 1.64 M/S
DOP CALC LVOT STROKE VOLUME: 107.39 CM3
DOP CALC MV VTI: 48.7 CM
DOP CALCLVOT PEAK VEL VTI: 34.2 CM
E WAVE DECELERATION TIME: 257 MSEC
E/A RATIO: 1.11
E/E' RATIO: 10.95 M/S
ECHO LV POSTERIOR WALL: 1.23 CM (ref 0.6–1.1)
EOSINOPHIL # BLD AUTO: 0.2 K/UL (ref 0–0.5)
EOSINOPHIL NFR BLD: 2.4 % (ref 0–8)
ERYTHROCYTE [DISTWIDTH] IN BLOOD BY AUTOMATED COUNT: 14 % (ref 11.5–14.5)
EST. GFR  (NO RACE VARIABLE): 59.9 ML/MIN/1.73 M^2
ESTIMATED AVG GLUCOSE: 100 MG/DL (ref 68–131)
FRACTIONAL SHORTENING: 35 % (ref 28–44)
GLUCOSE SERPL-MCNC: 127 MG/DL (ref 70–110)
HBA1C MFR BLD: 5.1 % (ref 4.5–6.2)
HCT VFR BLD AUTO: 32.2 % (ref 37–48.5)
HGB BLD-MCNC: 10.8 G/DL (ref 12–16)
IMM GRANULOCYTES # BLD AUTO: 0.03 K/UL (ref 0–0.04)
IMM GRANULOCYTES NFR BLD AUTO: 0.3 % (ref 0–0.5)
INTERVENTRICULAR SEPTUM: 1.4 CM (ref 0.6–1.1)
IVC DIAMETER: 1.57 CM
LEFT INTERNAL DIMENSION IN SYSTOLE: 2.67 CM (ref 2.1–4)
LEFT VENTRICLE DIASTOLIC VOLUME INDEX: 38.04 ML/M2
LEFT VENTRICLE DIASTOLIC VOLUME: 73.79 ML
LEFT VENTRICLE MASS INDEX: 101 G/M2
LEFT VENTRICLE SYSTOLIC VOLUME INDEX: 13.5 ML/M2
LEFT VENTRICLE SYSTOLIC VOLUME: 26.28 ML
LEFT VENTRICULAR INTERNAL DIMENSION IN DIASTOLE: 4.09 CM (ref 3.5–6)
LEFT VENTRICULAR MASS: 196.16 G
LV LATERAL E/E' RATIO: 10.45 M/S
LV SEPTAL E/E' RATIO: 11.5 M/S
LVOT MG: 5 MMHG
LVOT MV: 1.01 CM/S
LYMPHOCYTES # BLD AUTO: 1.9 K/UL (ref 1–4.8)
LYMPHOCYTES NFR BLD: 19.6 % (ref 18–48)
MCH RBC QN AUTO: 30.9 PG (ref 27–31)
MCHC RBC AUTO-ENTMCNC: 33.5 G/DL (ref 32–36)
MCV RBC AUTO: 92 FL (ref 82–98)
MONOCYTES # BLD AUTO: 0.8 K/UL (ref 0.3–1)
MONOCYTES NFR BLD: 8.2 % (ref 4–15)
MV MEAN GRADIENT: 4 MMHG
MV PEAK A VEL: 1.04 M/S
MV PEAK E VEL: 1.15 M/S
MV PEAK GRADIENT: 7 MMHG
MV STENOSIS PRESSURE HALF TIME: 113 MS
MV VALVE AREA BY CONTINUITY EQUATION: 2.21 CM2
MV VALVE AREA P 1/2 METHOD: 1.95 CM2
NEUTROPHILS # BLD AUTO: 6.5 K/UL (ref 1.8–7.7)
NEUTROPHILS NFR BLD: 69.1 % (ref 38–73)
NRBC BLD-RTO: 0 /100 WBC
PISA MRMAX VEL: 5.17 M/S
PISA TR MAX VEL: 2.96 M/S
PLATELET # BLD AUTO: 147 K/UL (ref 150–450)
PMV BLD AUTO: 10.4 FL (ref 9.2–12.9)
POTASSIUM SERPL-SCNC: 3.4 MMOL/L (ref 3.5–5.1)
PROT SERPL-MCNC: 6.1 G/DL (ref 6–8.4)
PV MV: 1.03 M/S
PV PEAK GRADIENT: 9 MMHG
PV PEAK VELOCITY: 1.54 M/S
RA PRESSURE ESTIMATED: 3 MMHG
RBC # BLD AUTO: 3.5 M/UL (ref 4–5.4)
RV TB RVSP: 6 MMHG
RV TISSUE DOPPLER FREE WALL SYSTOLIC VELOCITY 1 (APICAL 4 CHAMBER VIEW): 18.2 CM/S
SODIUM SERPL-SCNC: 141 MMOL/L (ref 136–145)
TDI LATERAL: 0.11 M/S
TDI SEPTAL: 0.1 M/S
TDI: 0.11 M/S
TR MAX PG: 35 MMHG
TROPONIN I SERPL HS-MCNC: 4.1 PG/ML (ref 0–14.9)
TROPONIN I SERPL HS-MCNC: 4.2 PG/ML (ref 0–14.9)
TROPONIN I SERPL HS-MCNC: 4.5 PG/ML (ref 0–14.9)
TSH SERPL DL<=0.005 MIU/L-ACNC: 2.48 UIU/ML (ref 0.34–5.6)
TV REST PULMONARY ARTERY PRESSURE: 38 MMHG
WBC # BLD AUTO: 9.43 K/UL (ref 3.9–12.7)
Z-SCORE OF LEFT VENTRICULAR DIMENSION IN END DIASTOLE: -2.98
Z-SCORE OF LEFT VENTRICULAR DIMENSION IN END SYSTOLE: -1.89

## 2024-05-10 PROCEDURE — 84443 ASSAY THYROID STIM HORMONE: CPT | Performed by: INTERNAL MEDICINE

## 2024-05-10 PROCEDURE — 21400001 HC TELEMETRY ROOM

## 2024-05-10 PROCEDURE — 63600175 PHARM REV CODE 636 W HCPCS: Performed by: INTERNAL MEDICINE

## 2024-05-10 PROCEDURE — 99223 1ST HOSP IP/OBS HIGH 75: CPT | Mod: ,,, | Performed by: INTERNAL MEDICINE

## 2024-05-10 PROCEDURE — 83036 HEMOGLOBIN GLYCOSYLATED A1C: CPT | Performed by: INTERNAL MEDICINE

## 2024-05-10 PROCEDURE — 84484 ASSAY OF TROPONIN QUANT: CPT | Performed by: INTERNAL MEDICINE

## 2024-05-10 PROCEDURE — 36415 COLL VENOUS BLD VENIPUNCTURE: CPT | Performed by: INTERNAL MEDICINE

## 2024-05-10 PROCEDURE — 93306 TTE W/DOPPLER COMPLETE: CPT | Mod: 26,,, | Performed by: STUDENT IN AN ORGANIZED HEALTH CARE EDUCATION/TRAINING PROGRAM

## 2024-05-10 PROCEDURE — 96375 TX/PRO/DX INJ NEW DRUG ADDON: CPT

## 2024-05-10 PROCEDURE — 93010 ELECTROCARDIOGRAM REPORT: CPT | Mod: ,,, | Performed by: GENERAL PRACTICE

## 2024-05-10 PROCEDURE — 93005 ELECTROCARDIOGRAM TRACING: CPT | Performed by: GENERAL PRACTICE

## 2024-05-10 PROCEDURE — 25000003 PHARM REV CODE 250: Performed by: HOSPITALIST

## 2024-05-10 PROCEDURE — 94761 N-INVAS EAR/PLS OXIMETRY MLT: CPT

## 2024-05-10 PROCEDURE — 80053 COMPREHEN METABOLIC PANEL: CPT | Performed by: INTERNAL MEDICINE

## 2024-05-10 PROCEDURE — 93306 TTE W/DOPPLER COMPLETE: CPT

## 2024-05-10 PROCEDURE — 63600175 PHARM REV CODE 636 W HCPCS: Performed by: EMERGENCY MEDICINE

## 2024-05-10 PROCEDURE — 25000003 PHARM REV CODE 250: Performed by: INTERNAL MEDICINE

## 2024-05-10 PROCEDURE — 85025 COMPLETE CBC W/AUTO DIFF WBC: CPT | Performed by: INTERNAL MEDICINE

## 2024-05-10 PROCEDURE — 25000003 PHARM REV CODE 250: Performed by: NURSE PRACTITIONER

## 2024-05-10 PROCEDURE — 99900031 HC PATIENT EDUCATION (STAT)

## 2024-05-10 RX ORDER — CYCLOSPORINE 0.5 MG/ML
1 EMULSION OPHTHALMIC EVERY 12 HOURS
COMMUNITY
Start: 2024-04-12

## 2024-05-10 RX ORDER — SODIUM CHLORIDE 9 MG/ML
INJECTION, SOLUTION INTRAVENOUS CONTINUOUS
Status: DISCONTINUED | OUTPATIENT
Start: 2024-05-10 | End: 2024-05-12

## 2024-05-10 RX ORDER — HYDROCHLOROTHIAZIDE 12.5 MG/1
12.5 TABLET ORAL DAILY
Status: DISCONTINUED | OUTPATIENT
Start: 2024-05-10 | End: 2024-05-13

## 2024-05-10 RX ORDER — AMLODIPINE BESYLATE 5 MG/1
5 TABLET ORAL 2 TIMES DAILY
Status: DISCONTINUED | OUTPATIENT
Start: 2024-05-10 | End: 2024-05-14 | Stop reason: HOSPADM

## 2024-05-10 RX ORDER — PANTOPRAZOLE SODIUM 40 MG/1
40 TABLET, DELAYED RELEASE ORAL DAILY
COMMUNITY
Start: 2024-02-16

## 2024-05-10 RX ORDER — IPRATROPIUM BROMIDE AND ALBUTEROL SULFATE 2.5; .5 MG/3ML; MG/3ML
3 SOLUTION RESPIRATORY (INHALATION) EVERY 6 HOURS PRN
COMMUNITY
Start: 2024-01-04

## 2024-05-10 RX ORDER — LISINOPRIL 20 MG/1
40 TABLET ORAL DAILY
Status: DISCONTINUED | OUTPATIENT
Start: 2024-05-10 | End: 2024-05-14 | Stop reason: HOSPADM

## 2024-05-10 RX ORDER — TALC
6 POWDER (GRAM) TOPICAL NIGHTLY PRN
Status: DISCONTINUED | OUTPATIENT
Start: 2024-05-10 | End: 2024-05-14 | Stop reason: HOSPADM

## 2024-05-10 RX ORDER — ISOSORBIDE MONONITRATE 30 MG/1
30 TABLET, EXTENDED RELEASE ORAL DAILY
Status: DISCONTINUED | OUTPATIENT
Start: 2024-05-10 | End: 2024-05-14

## 2024-05-10 RX ORDER — SODIUM CHLORIDE 0.9 % (FLUSH) 0.9 %
10 SYRINGE (ML) INJECTION
Status: DISCONTINUED | OUTPATIENT
Start: 2024-05-10 | End: 2024-05-14 | Stop reason: HOSPADM

## 2024-05-10 RX ORDER — HEPARIN SODIUM 5000 [USP'U]/ML
5000 INJECTION, SOLUTION INTRAVENOUS; SUBCUTANEOUS EVERY 8 HOURS
Status: DISCONTINUED | OUTPATIENT
Start: 2024-05-10 | End: 2024-05-14 | Stop reason: HOSPADM

## 2024-05-10 RX ORDER — AMLODIPINE BESYLATE 5 MG/1
5 TABLET ORAL NIGHTLY
COMMUNITY
Start: 2024-05-09

## 2024-05-10 RX ORDER — ONDANSETRON HYDROCHLORIDE 2 MG/ML
4 INJECTION, SOLUTION INTRAVENOUS EVERY 8 HOURS PRN
Status: DISCONTINUED | OUTPATIENT
Start: 2024-05-10 | End: 2024-05-14 | Stop reason: HOSPADM

## 2024-05-10 RX ORDER — LEVOTHYROXINE SODIUM 25 UG/1
75 TABLET ORAL
Status: DISCONTINUED | OUTPATIENT
Start: 2024-05-10 | End: 2024-05-14 | Stop reason: HOSPADM

## 2024-05-10 RX ORDER — ACETAMINOPHEN 325 MG/1
650 TABLET ORAL EVERY 8 HOURS PRN
Status: DISCONTINUED | OUTPATIENT
Start: 2024-05-10 | End: 2024-05-14 | Stop reason: HOSPADM

## 2024-05-10 RX ORDER — HYDROCODONE BITARTRATE AND ACETAMINOPHEN 5; 325 MG/1; MG/1
1 TABLET ORAL EVERY 4 HOURS PRN
Status: DISCONTINUED | OUTPATIENT
Start: 2024-05-10 | End: 2024-05-14 | Stop reason: HOSPADM

## 2024-05-10 RX ORDER — POLYETHYLENE GLYCOL 3350 17 G/17G
17 POWDER, FOR SOLUTION ORAL DAILY
Status: DISCONTINUED | OUTPATIENT
Start: 2024-05-10 | End: 2024-05-14 | Stop reason: HOSPADM

## 2024-05-10 RX ORDER — HYDRALAZINE HYDROCHLORIDE 20 MG/ML
10 INJECTION INTRAMUSCULAR; INTRAVENOUS
Status: COMPLETED | OUTPATIENT
Start: 2024-05-10 | End: 2024-05-10

## 2024-05-10 RX ORDER — PRAMIPEXOLE DIHYDROCHLORIDE 0.5 MG/1
0.5 TABLET ORAL NIGHTLY
Status: DISCONTINUED | OUTPATIENT
Start: 2024-05-10 | End: 2024-05-14 | Stop reason: HOSPADM

## 2024-05-10 RX ORDER — DULOXETIN HYDROCHLORIDE 30 MG/1
60 CAPSULE, DELAYED RELEASE ORAL NIGHTLY
Status: DISCONTINUED | OUTPATIENT
Start: 2024-05-10 | End: 2024-05-14 | Stop reason: HOSPADM

## 2024-05-10 RX ORDER — ATORVASTATIN CALCIUM 40 MG/1
40 TABLET, FILM COATED ORAL DAILY
Status: DISCONTINUED | OUTPATIENT
Start: 2024-05-11 | End: 2024-05-14 | Stop reason: HOSPADM

## 2024-05-10 RX ORDER — DULOXETIN HYDROCHLORIDE 60 MG/1
60 CAPSULE, DELAYED RELEASE ORAL DAILY
COMMUNITY

## 2024-05-10 RX ORDER — AMOXICILLIN 250 MG
1 CAPSULE ORAL 2 TIMES DAILY
Status: DISCONTINUED | OUTPATIENT
Start: 2024-05-10 | End: 2024-05-14 | Stop reason: HOSPADM

## 2024-05-10 RX ORDER — RANOLAZINE 500 MG/1
500 TABLET, EXTENDED RELEASE ORAL 2 TIMES DAILY
Status: DISCONTINUED | OUTPATIENT
Start: 2024-05-10 | End: 2024-05-14

## 2024-05-10 RX ADMIN — PRAMIPEXOLE DIHYDROCHLORIDE 0.5 MG: 0.5 TABLET ORAL at 09:05

## 2024-05-10 RX ADMIN — SENNOSIDES AND DOCUSATE SODIUM 1 TABLET: 50; 8.6 TABLET ORAL at 09:05

## 2024-05-10 RX ADMIN — HYDRALAZINE HYDROCHLORIDE 10 MG: 20 INJECTION INTRAMUSCULAR; INTRAVENOUS at 01:05

## 2024-05-10 RX ADMIN — ISOSORBIDE MONONITRATE 30 MG: 30 TABLET, EXTENDED RELEASE ORAL at 04:05

## 2024-05-10 RX ADMIN — DULOXETINE HYDROCHLORIDE 60 MG: 30 CAPSULE, DELAYED RELEASE ORAL at 09:05

## 2024-05-10 RX ADMIN — HEPARIN SODIUM 5000 UNITS: 5000 INJECTION INTRAVENOUS; SUBCUTANEOUS at 06:05

## 2024-05-10 RX ADMIN — HEPARIN SODIUM 5000 UNITS: 5000 INJECTION INTRAVENOUS; SUBCUTANEOUS at 09:05

## 2024-05-10 RX ADMIN — AMLODIPINE BESYLATE 5 MG: 5 TABLET ORAL at 09:05

## 2024-05-10 RX ADMIN — RANOLAZINE 500 MG: 500 TABLET, FILM COATED, EXTENDED RELEASE ORAL at 09:05

## 2024-05-10 RX ADMIN — LISINOPRIL 40 MG: 20 TABLET ORAL at 09:05

## 2024-05-10 RX ADMIN — HYDROCODONE BITARTRATE AND ACETAMINOPHEN 1 TABLET: 5; 325 TABLET ORAL at 07:05

## 2024-05-10 NOTE — PLAN OF CARE
Critical access hospital  Initial Discharge Assessment    Assessment completed at bedside with Pt, and all information on FaceSheet confirmed, including demographics, PCP, pharmacy and insurance. Pt has not addressed advance directives. She currently lives with her spouse in [place]. Her PCP is Mitch Marquez MD, and last appointment was four months ago. Her preferred pharmacy is [email or web address]. She denies any HH/HD/Blood Thinners but does use a CPAP. For transportation to appointments, she usually drives herself but her spouse will transport her back home at discharge. She denies any recent hospitalizations. Plan for discharge is to return home. Case Management to continue to follow for discharge planning needs.          Primary Care Provider: Mitch Marquez MD    Admission Diagnosis: Chest pain, unspecified type [R07.9]    Admission Date: 5/9/2024  Expected Discharge Date: 5/11/2024    Transition of Care Barriers: (P) None    Payor: HUMANA MANAGED MEDICARE / Plan: HUMANA MEDICARE PPO / Product Type: Medicare Advantage /     Extended Emergency Contact Information  Primary Emergency Contact: Ramón Alicea  Address: [address] [place] [address] [place] [address] [facility]  Home Phone: [phone]  Mobile Phone: [phone]  Relation: Spouse  Secondary Emergency Contact: Yeni Thibodeaux   [place]  Mobile Phone: [phone]  Relation: Daughter    Discharge Plan A: (P) Home with family  Discharge Plan B: (P) Home with family      [id number] - [place] [place] - [address] AT [facility]  [address]  [place] [address]  Phone: [phone] Fax: [phone]      Initial Assessment (most recent)       Adult Discharge Assessment - 05/10/24 1047          Discharge Assessment    Assessment Type Discharge Planning Assessment (P)      Confirmed/corrected address, phone number and insurance Yes (P)      Confirmed Demographics Correct on Facesheet (P)      Source of  Information patient (P)      When was your last doctors appointment? -- (P)    four months    Does patient/caregiver understand observation status Yes (P)      Reason For Admission Chest pian (P)      People in Home spouse (P)      Do you expect to return to your current living situation? Yes (P)      Do you have help at home or someone to help you manage your care at home? Yes (P)      Who are your caregiver(s) and their phone number(s)? Ramón Alicea (Spouse)  [phone] (Mobile) (P)      Prior to hospitilization cognitive status: Alert/Oriented (P)      Current cognitive status: Alert/Oriented (P)      Walking or Climbing Stairs Difficulty no (P)      Dressing/Bathing Difficulty no (P)      Home Accessibility wheelchair accessible (P)      Home Layout Able to live on 1st floor (P)      Equipment Currently Used at Home CPAP (P)      Readmission within 30 days? No (P)      Patient currently being followed by outpatient case management? No (P)      Do you currently have service(s) that help you manage your care at home? No (P)      Do you take prescription medications? Yes (P)      Do you have prescription coverage? Yes (P)      Coverage HU[employer] MANAGED MEDICARE - HU[employer]NA MEDICARE PPO (P)      Do you have any problems affording any of your prescribed medications? No (P)      Is the patient taking medications as prescribed? yes (P)      Who is going to help you get home at discharge? NixonRamón (Spouse)  [phone] (Mobile) (P)      How do you get to doctors appointments? car, drives self (P)      Are you on dialysis? No (P)      Do you take coumadin? No (P)      Discharge Plan A Home with family (P)      Discharge Plan B Home with family (P)      DME Needed Upon Discharge  none (P)      Discharge Plan discussed with: Patient (P)      Transition of Care Barriers None (P)

## 2024-05-10 NOTE — CONSULTS
[facility]  Department of Cardiology  Consult Note      PATIENT NAME: Darshana Alicea    MRN: [id number]  TODAY'S DATE: 05/10/2024  ADMIT DATE: 5/9/2024                          CONSULT REQUESTED BY: Jessica Ponce MD    SUBJECTIVE     PRINCIPAL PROBLEM: Chest pain    HPI:    Patient is a 73-year-old female who presented to the emergency room with complaints of substernal chest discomfort radiating to her left shoulder.  She also had some shortness of breath with exertion.  Patient noted to have reproducible chest pain on evaluation.  She has had a negative stress test last year and follows with Cardiology outpatient.  Patient had an angiogram in 2007 which showed small vessels but patent coronaries at that time.       REASON FOR CONSULT:  From Hospitalist H&P: Ms. Alicea  is a 73 y.o. female with PMH significant for PMH HTN, LORENZO, HLD, angina, hypothyroidism, kidney stones who is being evaluated by the [facility] service after developing intermittent substernal chest pressure rated 2/10 in severity radiating to her left shoulder and arm since the previous night. Pt denied orthopnea or lower extremity swelling. She admits to some shortness of breath on exertion.  She also complained of right lower flank pain due to her history of kidney stones. She denies any urinary frequency, urgency, burning or retention. Patient admits to headache but denies visual changes. She had been prescribed Nitroglycerin for angina but did not take it because she feels it would worsen her headache. She has elevated BP but takes amlodipine/benazepril 5/40mg in the morning and her PCP suggested she take Amlodiipine 5mg at night as well. She had a stress test in October of 2023 with EF of 74%.  No wall motion abnormalities noted on rest or stress images.  Stress test negative for ischemia by EKG criteria.  Slight decreased uptake noted in the inferior apical wall both rest. She denies any focal weakness, numbness tingling or  paresthesias.  Denies any abdominal pain.  Denies melena hematochezia or any GI bleeding.  Denies diarrhea or constipation.  Patient denies headache or visual changes.  Denies any cough or cold symptoms.  Denies dysuria, frequency or urgency.  Denies any other problems or complaints.     Pt denies history of afib, denies personal hx of blood clots. She lives with her  and performs all ADLs independently. Pt denies alcohol and denies recreational drug use, denies smoking.         Review of patient's allergies indicates:   Allergen Reactions    Lyrica [pregabalin] Swelling    Sulfa (sulfonamide antibiotics) Swelling    Toradol [ketorolac] Swelling       Past Medical History:   Diagnosis Date    Anemia     Colon polyp     Hypothyroidism 12/10/2023    Kidney stone     Kidney stones 10/29/2012    Thyroid disease      Past Surgical History:   Procedure Laterality Date    BLADDER SURGERY      CHOLECYSTECTOMY      CYSTOSCOPY      HYSTERECTOMY      OOPHORECTOMY      tonsillectomy   1964     Social History     Tobacco Use    Smoking status: Former     Current packs/day: 0.00     Types: Cigarettes     Start date: 1971     Quit date: 2001     Years since quittin.4   Substance Use Topics    Alcohol use: No    Drug use: No        REVIEW OF SYSTEMS  Per HPI    OBJECTIVE     VITAL SIGNS (Most Recent)  Temp: 98.5 °F (36.9 °C) (05/10/24 111)  Pulse: (!) 56 (05/10/24 111)  Resp: 16 (05/10/24 1119)  BP: (!) 146/75 (05/10/24 111)  SpO2: 97 % (05/10/24 111)    VENTILATION STATUS  Resp: 16 (05/10/24 1119)  SpO2: 97 % (05/10/24 1119)           I & O (Last 24H):  Intake/Output Summary (Last 24 hours) at 5/10/2024 1400  Last data filed at 5/10/2024 1255  Gross per 24 hour   Intake 480 ml   Output 500 ml   Net -20 ml       WEIGHTS  Wt Readings from Last 1 Encounters:   05/10/24 0257 88.6 kg (195 lb 6.4 oz)   24 1436 86.2 kg (190 lb)       PHYSICAL EXAM  CONSTITUTIONAL: No fever, no chills  HEENT: Normocephalic,  atraumatic,pupils reactive to light                 NECK:  No JVD no carotid bruit  CVS: S1S2+, RRR  LUNGS: Clear  ABDOMEN: Soft, NT, BS+  EXTREMITIES: No cyanosis, edema  : No medina catheter  NEURO: AAO X 3  PSY: Normal affect      HOME MEDICATIONS:  No current facility-administered medications on file prior to encounter.     Current Outpatient Medications on File Prior to Encounter   Medication Sig Dispense Refill    albuterol-ipratropium (DUO-NEB) 2.5 mg-0.5 mg/3 mL nebulizer solution Inhale 3 mLs into the lungs every 6 (six) hours as needed.      amLODIPine (NORVASC) 5 MG tablet Take 5 mg by mouth every evening.      beta-carotene,A,-vits C,E/mins (OCUVITE ORAL) Take 1 tablet by mouth once daily.      ciclopirox (PENLAC) 8 % Soln Apply 1 drop topically nightly.      DULoxetine (CYMBALTA) 60 MG capsule Take 60 mg by mouth once daily.      levothyroxine (SYNTHROID) 75 MCG tablet Take 75 mcg by mouth once daily.      nitroGLYCERIN (NITROSTAT) 0.4 MG SL tablet Place 0.4 mg under the tongue every 5 (five) minutes as needed for Chest pain.      pramipexole (MIRAPEX) 0.5 MG tablet Take 0.5 mg by mouth every evening.      RESTASIS 0.05 % ophthalmic emulsion Apply 1 drop to eye every 12 (twelve) hours.      rosuvastatin (CRESTOR) 20 MG tablet Take 1 tablet by mouth once daily.      ergocalciferol (ERGOCALCIFEROL) 50,000 unit Cap Take 50,000 Units by mouth every 7 days. SATURDAYS      hydroCHLOROthiazide (MICROZIDE) 12.5 mg capsule Take 12.5 mg by mouth once daily. (Patient not taking: Reported on 5/10/2024)      pantoprazole (PROTONIX) 40 MG tablet Take 40 mg by mouth once daily. (Patient not taking: Reported on 5/10/2024)      solifenacin (VESICARE) 10 MG tablet Take 1 tablet (10 mg total) by mouth once daily. (Patient not taking: Reported on 5/10/2024) 30 tablet 11    traZODone (DESYREL) 50 MG tablet Take 50 mg by mouth every evening. (Patient not taking: Reported on 5/10/2024)      [DISCONTINUED]  "amLODIPine-benazepriL (LOTREL) 5-40 mg per capsule Take 1 capsule by mouth once daily.      [DISCONTINUED] ondansetron (ZOFRAN-ODT) 4 MG TbDL Take 1 tablet (4 mg total) by mouth every 6 (six) hours as needed (N/V). 20 tablet 0    [DISCONTINUED] promethazine (PHENERGAN) 25 MG tablet Take 1 tablet (25 mg total) by mouth every 6 (six) hours as needed for Nausea. 20 tablet 0       SCHEDULED MEDS:   amLODIPine  5 mg Oral BID    heparin (porcine)  5,000 Units Subcutaneous Q8H    hydroCHLOROthiazide  12.5 mg Oral Daily    levothyroxine  75 mcg Oral Before breakfast    lisinopriL  40 mg Oral Daily    polyethylene glycol  17 g Oral Daily    ranolazine  500 mg Oral BID    senna-docusate 8.6-50 mg  1 tablet Oral BID       CONTINUOUS INFUSIONS:   sodium chloride 0.9%   Intravenous Continuous           PRN MEDS:  Current Facility-Administered Medications:     acetaminophen, 650 mg, Oral, Q8H PRN    acetaminophen, 650 mg, Oral, Q8H PRN    HYDROcodone-acetaminophen, 1 tablet, Oral, Q4H PRN    melatonin, 6 mg, Oral, Nightly PRN    ondansetron, 4 mg, Intravenous, Q8H PRN    sodium chloride 0.9%, 10 mL, Intravenous, PRN    LABS AND DIAGNOSTICS     CBC LAST 3 DAYS  Recent Labs   Lab 05/09/24  1646 05/10/24  0531   WBC 7.15 9.43   RBC 3.65* 3.50*   HGB 11.2* 10.8*   HCT 33.5* 32.2*   MCV 92 92   MCH 30.7 30.9   MCHC 33.4 33.5   RDW 13.7 14.0    147*   MPV 10.2 10.4   GRAN 61.2  4.4 69.1  6.5   LYMPH 24.1  1.7 19.6  1.9   MONO 11.2  0.8 8.2  0.8   BASO 0.03 0.04   NRBC 0 0       COAGULATION LAST 3 DAYS  No results for input(s): "LABPT", "INR", "APTT" in the last 168 hours.    CHEMISTRY LAST 3 DAYS  Recent Labs   Lab 05/09/24  1646 05/10/24  0531    141   K 3.7 3.4*    108   CO2 28 29   ANIONGAP 6* 4*   BUN 14 17   CREATININE 0.9 1.0   GLU 78 127*   CALCIUM 8.7 8.5*   ALBUMIN 4.3 3.8   PROT 6.8 6.1   ALKPHOS 43* 41*   ALT 13 25   AST 15 34   BILITOT 0.8 0.8       CARDIAC PROFILE LAST 3 DAYS  Recent Labs   Lab " "05/09/24  2114 05/10/24  0229 05/10/24  0531   BNP 31  --   --    TROPONINIHS 3.1 4.5 4.1  4.2       ENDOCRINE LAST 3 DAYS  Recent Labs   Lab 05/10/24  0531   TSH 2.476       LAST ARTERIAL BLOOD GAS  ABG  No results for input(s): "PH", "PO2", "PCO2", "HCO3", "BE" in the last 168 hours.    LAST 7 DAYS MICROBIOLOGY   Microbiology Results (last 7 days)       ** No results found for the last 168 hours. **            MOST RECENT IMAGING  X-Ray Chest PA And Lateral  Narrative: EXAMINATION:  XR CHEST PA AND LATERAL    CLINICAL HISTORY:  chest pain; Chest pain, unspecified    COMPARISON:  None available    FINDINGS:  Cardiac silhouette size is within normal limits.  Lungs are normally expanded and clear.  No pleural fluid or pneumothorax.  No acute osseous abnormality.  Status post cervical ACDF.  Impression: No acute pulmonary process.    Electronically signed by: Mariano Gibson  Date:    05/09/2024  Time:    14:59      ECHOCARDIOGRAM RESULTS (last 5)  No results found for this or any previous visit.      CURRENT/PREVIOUS VISIT EKG  Results for orders placed or performed during the hospital encounter of 05/09/24   EKG 12-lead    Collection Time: 05/10/24 11:54 AM   Result Value Ref Range    QRS Duration 84 ms    [facility] QTC Calculation 432 ms    Narrative    Test Reason : R07.9,    Vent. Rate : 069 BPM     Atrial Rate : 069 BPM     P-R Int : 168 ms          QRS Dur : 084 ms      QT Int : 404 ms       P-R-T Axes : 078 061 075 degrees     QTc Int : 432 ms    Normal sinus rhythm  Normal ECG  When compared with ECG of 09-MAY-2024 14:30,  No significant change was found    Referred By: [email or web address]   SELF           Confirmed By:            ASSESSMENT/PLAN:     Active Hospital Problems    Diagnosis    *Chest pain    Chronic right flank pain    Hypertensive urgency    Hypothyroidism    Stage 3 chronic kidney disease    Kidney stones       ASSESSMENT & PLAN:     Chest pain  HTN urgency  CKD      RECOMMENDATIONS:    Troponin levels have " been negative thus far, and her chest pain is atypical.  Chest pain is reproducible with palpation.  Discussed with her the option for medical management versus possible angiogram as she has had ongoing chest pain albeit atypical.  Patient would prefer to be discharged home and follow-up with her primary cardiologist next week.  Would like to have her walk around the unit and see how she does with exertion.  Recommend maximizing medical therapy.  Continue amlodipine 5 mg p.o. b.i.d., hydrochlorothiazide 12.5 mg p.o. daily, lisinopril 40 mg p.o. daily, Ranexa 500 mg p.o. b.i.d...  Heart rate is in the 60s primarily with occasionally dropping down into the 50s.  Will have to hold off on adding any beta-blockers.  Can add Imdur 30 mg p.o. daily.  Okay to discharge home from cardiac standpoint if she has a close follow-up with her primary cardiologist next week and has no concerning symptoms with exertion.  Otherwise can keep her here over the weekend and plan for angiogram on Monday.        Myah Silver NP  Date of Service: 05/10/2024  2:00 PM

## 2024-05-10 NOTE — NURSING
Patient c/o left sided chest pain.  Describes as squeezing and increased with movement.  Upon palpating left of sternum, pt guarded from pain.  Meds per emar

## 2024-05-10 NOTE — H&P
[facility]  History & Physical    Patient Name: Darshana Alicea  MRN: [id number]  Patient Class: IP- Inpatient  Admission Date: 2024  Attending Physician: Jessica Ponce MD   Primary Care Provider: Mitch Marquez MD         Patient information was obtained from patient and ER records.     Subjective:     Principal Problem:Chest pain    Chief Complaint:   Chief Complaint   Patient presents with    Chest Pain     Left sided CP radiating to left arm that started last night. Denies . Endorses nausea and h/a.Sent by Brunilda Pascaul NP     Nausea    Headache        HPI: Ms. Alicea  is a 73 y.o. female with PMH significant for PMH HTN, LORENZO, HLD, angina, hypothyroidism, kidney stones who is being evaluated by the Hospital Medicine service after developing intermittent substernal chest pressure rated 2/10 in severity radiating to her left shoulder and arm since the previous night. Pt denied orthopnea or lower extremity swelling. She admits to some shortness of breath on exertion.  She also complained of right lower flank pain due to her history of kidney stones. She denies any urinary frequency, urgency, burning or retention. Patient admits to headache but denies visual changes. She had been prescribed Nitroglycerin for angina but did not take it because she feels it would worsen her headache. She has elevated BP but takes amlodipine/benazepril 5/40mg in the morning and her PCP suggested she take Amlodiipine 5mg at night as well. She had a stress test in 2023 with EF of 74%.  No wall motion abnormalities noted on rest or stress images.  Stress test negative for ischemia by EKG criteria.  Slight decreased uptake noted in the inferior apical wall both rest. She denies any focal weakness, numbness tingling or paresthesias.  Denies any abdominal pain.  Denies melena hematochezia or any GI bleeding.  Denies diarrhea or constipation.  Patient denies headache or visual changes.   Denies any cough or cold symptoms.  Denies dysuria, frequency or urgency.  Denies any other problems or complaints.    Pt denies history of afib, denies personal hx of blood clots. She lives with her  and performs all ADLs independently. Pt denies alcohol and denies recreational drug use, denies smoking.      Past Medical History:   Diagnosis Date    Anemia     Colon polyp     Hypothyroidism 12/10/2023    Kidney stone     Kidney stones 10/29/2012    Thyroid disease        Past Surgical History:   Procedure Laterality Date    BLADDER SURGERY      CHOLECYSTECTOMY      CYSTOSCOPY      HYSTERECTOMY      OOPHORECTOMY      tonsillectomy   1964       Review of patient's allergies indicates:   Allergen Reactions    Lyrica [pregabalin] Swelling    Sulfa (sulfonamide antibiotics) Swelling    Toradol [ketorolac] Swelling       No current facility-administered medications on file prior to encounter.     Current Outpatient Medications on File Prior to Encounter   Medication Sig    amLODIPine-benazepriL (LOTREL) 5-40 mg per capsule Take 1 capsule by mouth once daily.    ciclopirox (PENLAC) 8 % Soln Apply 1 drop topically nightly.    ergocalciferol (ERGOCALCIFEROL) 50,000 unit Cap Take 50,000 Units by mouth every 7 days. SATURDAYS    hydroCHLOROthiazide (MICROZIDE) 12.5 mg capsule Take 12.5 mg by mouth once daily.    levothyroxine (SYNTHROID) 75 MCG tablet Take 75 mcg by mouth once daily.    nitroGLYCERIN (NITROSTAT) 0.4 MG SL tablet Place 0.4 mg under the tongue every 5 (five) minutes as needed for Chest pain.    ondansetron (ZOFRAN-ODT) 4 MG TbDL Take 1 tablet (4 mg total) by mouth every 6 (six) hours as needed (N/V).    pramipexole (MIRAPEX) 0.5 MG tablet Take 0.5 mg by mouth every evening.    promethazine (PHENERGAN) 25 MG tablet Take 1 tablet (25 mg total) by mouth every 6 (six) hours as needed for Nausea. (Patient not taking: Reported on 12/9/2023)    rosuvastatin (CRESTOR) 20 MG tablet Take 1 tablet by mouth once  daily.    solifenacin (VESICARE) 10 MG tablet Take 1 tablet (10 mg total) by mouth once daily.    traZODone (DESYREL) 50 MG tablet Take 50 mg by mouth every evening.     Family History       Problem Relation (Age of Onset)    Cancer Father, Paternal Grandfather    Heart disease Maternal Grandfather          Tobacco Use    Smoking status: Former     Current packs/day: 0.00     Types: Cigarettes     Start date: 1971     Quit date: 2001     Years since quittin.4    Smokeless tobacco: Not on file   Substance and Sexual Activity    Alcohol use: No    Drug use: No    Sexual activity: Not on file     Review of Systems   Constitutional:  Negative for chills, fever and unexpected weight change.   HENT:  Negative for ear pain, hearing loss, sneezing and sore throat.    Eyes:  Negative for discharge.   Respiratory:  Positive for chest tightness and shortness of breath. Negative for cough and wheezing.    Cardiovascular:  Positive for chest pain. Negative for palpitations and leg swelling.   Gastrointestinal:  Negative for abdominal distention, anal bleeding, blood in stool, constipation, nausea, rectal pain and vomiting.   Endocrine: Negative for cold intolerance and heat intolerance.   Genitourinary:  Negative for difficulty urinating, flank pain, frequency, pelvic pain, urgency, vaginal bleeding, vaginal discharge and vaginal pain.   Musculoskeletal:  Negative for back pain, myalgias and neck pain.   Neurological:  Negative for dizziness, tremors, seizures, syncope, speech difficulty, weakness, numbness and headaches.   Hematological:  Negative for adenopathy. Does not bruise/bleed easily.   Psychiatric/Behavioral:  Negative for behavioral problems, confusion, hallucinations, sleep disturbance and suicidal ideas. The patient is not nervous/anxious.      Objective:     Vital Signs (Most Recent):  Temp: 98 °F (36.7 °C) (05/10/24 0717)  Pulse: 72 (05/10/24 0816)  Resp: 16 (05/10/24 0816)  BP: (!) 163/72 (05/10/24  0717)  SpO2: 98 % (05/10/24 0816) Vital Signs (24h Range):  Temp:  [97.9 °F (36.6 °C)-98.3 °F (36.8 °C)] 98 °F (36.7 °C)  Pulse:  [56-78] 72  Resp:  [16-20] 16  SpO2:  [94 %-100 %] 98 %  BP: (145-183)/(65-79) 163/72     Weight: 88.6 kg (195 lb 6.4 oz)  Body mass index is 33.02 kg/m².     Physical Exam  Constitutional:       Appearance: Normal appearance.   HENT:      Head: Normocephalic and atraumatic.      Nose: Nose normal.   Eyes:      Extraocular Movements: Extraocular movements intact.      Pupils: Pupils are equal, round, and reactive to light.   Cardiovascular:      Rate and Rhythm: Normal rate and regular rhythm.      Pulses: Normal pulses.      Heart sounds: Normal heart sounds.   Pulmonary:      Effort: Pulmonary effort is normal.      Breath sounds: Normal breath sounds.   Abdominal:      General: Abdomen is flat. Bowel sounds are normal.      Palpations: Abdomen is soft.   Musculoskeletal:         General: Normal range of motion.      Cervical back: Normal range of motion.   Skin:     General: Skin is warm and dry.      Capillary Refill: Capillary refill takes less than 2 seconds.   Neurological:      General: No focal deficit present.      Mental Status: She is alert and oriented to person, place, and time. Mental status is at baseline.   Psychiatric:         Mood and Affect: Mood normal.              CRANIAL NERVES     CN III, IV, VI   Pupils are equal, round, and reactive to light.       Significant Labs: All pertinent labs within the past 24 hours have been reviewed.    Significant Imaging: I have reviewed all pertinent imaging results/findings within the past 24 hours.  Assessment/Plan:     * Chest pain  Chest pain R/O ACS  Unstable angina  -- Start Ranolazine 500mg BID  --Pain control PRN  --2D echo.  --EKG.  --Cardiac enzymes.  --TSH.   -- Cardiology consult      Hypertensive urgency  Patient has a current diagnosis of hypertensive urgency (without evidence of end organ damage) which is  uncontrolled.  Latest blood pressure and vitals reviewed-   Temp:  [97.9 °F (36.6 °C)-98.3 °F (36.8 °C)]   Pulse:  [56-78]   Resp:  [16-20]   BP: (145-183)/(65-79)   SpO2:  [94 %-100 %] .   Patient currently off IV antihypertensives.   Home meds for hypertension were reviewed and noted below.   Hypertension Medications               amLODIPine-benazepriL (LOTREL) 5-40 mg per capsule Take 1 capsule by mouth once daily.    hydroCHLOROthiazide (MICROZIDE) 12.5 mg capsule Take 12.5 mg by mouth once daily.    nitroGLYCERIN (NITROSTAT) 0.4 MG SL tablet Place 0.4 mg under the tongue every 5 (five) minutes as needed for Chest pain.            Medication adjustment for hospital antihypertensives is as follows- Resume home meds    Will aim for controlled BP reduction by medications noted above. Monitor and mitigate end organ damage as indicated.    Chronic right flank pain  Patient states she has a kidney stone that she is waiting to pass  Start IV hydration      Hypothyroidism  Continue Levothyroxine 75mcg daily      Stage 3 chronic kidney disease  Creatine stable for now. BMP reviewed- noted Estimated Creatinine Clearance: 55.4 mL/min (based on SCr of 1 mg/dL). according to latest data. Based on current GFR, CKD stage is stage 3 - GFR 30-59.  Monitor UOP and serial BMP and adjust therapy as needed. Renally dose meds. Avoid nephrotoxic medications and procedures.    Kidney stones  Start IV fluid hydration 75cc of normal saline/hr        VTE Risk Mitigation (From admission, onward)           Ordered     heparin (porcine) injection 5,000 Units  Every 8 hours         05/10/24 0116     IP VTE HIGH RISK PATIENT  Once         05/10/24 0116     Place sequential compression device  Until discontinued         05/10/24 0116                                    Jeff Braga MD  Department of Hospital Medicine  [facility]

## 2024-05-10 NOTE — SUBJECTIVE & OBJECTIVE
Past Medical History:   Diagnosis Date    Anemia     Colon polyp     Hypothyroidism 12/10/2023    Kidney stone     Kidney stones 10/29/2012    Thyroid disease        Past Surgical History:   Procedure Laterality Date    BLADDER SURGERY      CHOLECYSTECTOMY      CYSTOSCOPY      HYSTERECTOMY      OOPHORECTOMY      tonsillectomy   1964       Review of patient's allergies indicates:   Allergen Reactions    Lyrica [pregabalin] Swelling    Sulfa (sulfonamide antibiotics) Swelling    Toradol [ketorolac] Swelling       No current facility-administered medications on file prior to encounter.     Current Outpatient Medications on File Prior to Encounter   Medication Sig    amLODIPine-benazepriL (LOTREL) 5-40 mg per capsule Take 1 capsule by mouth once daily.    ciclopirox (PENLAC) 8 % Soln Apply 1 drop topically nightly.    ergocalciferol (ERGOCALCIFEROL) 50,000 unit Cap Take 50,000 Units by mouth every 7 days. SATURDAYS    hydroCHLOROthiazide (MICROZIDE) 12.5 mg capsule Take 12.5 mg by mouth once daily.    levothyroxine (SYNTHROID) 75 MCG tablet Take 75 mcg by mouth once daily.    nitroGLYCERIN (NITROSTAT) 0.4 MG SL tablet Place 0.4 mg under the tongue every 5 (five) minutes as needed for Chest pain.    ondansetron (ZOFRAN-ODT) 4 MG TbDL Take 1 tablet (4 mg total) by mouth every 6 (six) hours as needed (N/V).    pramipexole (MIRAPEX) 0.5 MG tablet Take 0.5 mg by mouth every evening.    promethazine (PHENERGAN) 25 MG tablet Take 1 tablet (25 mg total) by mouth every 6 (six) hours as needed for Nausea. (Patient not taking: Reported on 12/9/2023)    rosuvastatin (CRESTOR) 20 MG tablet Take 1 tablet by mouth once daily.    solifenacin (VESICARE) 10 MG tablet Take 1 tablet (10 mg total) by mouth once daily.    traZODone (DESYREL) 50 MG tablet Take 50 mg by mouth every evening.     Family History       Problem Relation (Age of Onset)    Cancer Father, Paternal Grandfather    Heart disease Maternal Grandfather          Tobacco Use     Smoking status: Former     Current packs/day: 0.00     Types: Cigarettes     Start date: 1971     Quit date: 2001     Years since quittin.4    Smokeless tobacco: Not on file   Substance and Sexual Activity    Alcohol use: No    Drug use: No    Sexual activity: Not on file     Review of Systems   Constitutional:  Negative for chills, fever and unexpected weight change.   HENT:  Negative for ear pain, hearing loss, sneezing and sore throat.    Eyes:  Negative for discharge.   Respiratory:  Positive for chest tightness and shortness of breath. Negative for cough and wheezing.    Cardiovascular:  Positive for chest pain. Negative for palpitations and leg swelling.   Gastrointestinal:  Negative for abdominal distention, anal bleeding, blood in stool, constipation, nausea, rectal pain and vomiting.   Endocrine: Negative for cold intolerance and heat intolerance.   Genitourinary:  Negative for difficulty urinating, flank pain, frequency, pelvic pain, urgency, vaginal bleeding, vaginal discharge and vaginal pain.   Musculoskeletal:  Negative for back pain, myalgias and neck pain.   Neurological:  Negative for dizziness, tremors, seizures, syncope, speech difficulty, weakness, numbness and headaches.   Hematological:  Negative for adenopathy. Does not bruise/bleed easily.   Psychiatric/Behavioral:  Negative for behavioral problems, confusion, hallucinations, sleep disturbance and suicidal ideas. The patient is not nervous/anxious.      Objective:     Vital Signs (Most Recent):  Temp: 98 °F (36.7 °C) (05/10/24 0717)  Pulse: 72 (05/10/24 0816)  Resp: 16 (05/10/24 0816)  BP: (!) 163/72 (05/10/24 0717)  SpO2: 98 % (05/10/24 0816) Vital Signs (24h Range):  Temp:  [97.9 °F (36.6 °C)-98.3 °F (36.8 °C)] 98 °F (36.7 °C)  Pulse:  [56-78] 72  Resp:  [16-20] 16  SpO2:  [94 %-100 %] 98 %  BP: (145-183)/(65-79) 163/72     Weight: 88.6 kg (195 lb 6.4 oz)  Body mass index is 33.02 kg/m².     Physical Exam  Constitutional:        Appearance: Normal appearance.   HENT:      Head: Normocephalic and atraumatic.      Nose: Nose normal.   Eyes:      Extraocular Movements: Extraocular movements intact.      Pupils: Pupils are equal, round, and reactive to light.   Cardiovascular:      Rate and Rhythm: Normal rate and regular rhythm.      Pulses: Normal pulses.      Heart sounds: Normal heart sounds.   Pulmonary:      Effort: Pulmonary effort is normal.      Breath sounds: Normal breath sounds.   Abdominal:      General: Abdomen is flat. Bowel sounds are normal.      Palpations: Abdomen is soft.   Musculoskeletal:         General: Normal range of motion.      Cervical back: Normal range of motion.   Skin:     General: Skin is warm and dry.      Capillary Refill: Capillary refill takes less than 2 seconds.   Neurological:      General: No focal deficit present.      Mental Status: She is alert and oriented to person, place, and time. Mental status is at baseline.   Psychiatric:         Mood and Affect: Mood normal.              CRANIAL NERVES     CN III, IV, VI   Pupils are equal, round, and reactive to light.       Significant Labs: All pertinent labs within the past 24 hours have been reviewed.    Significant Imaging: I have reviewed all pertinent imaging results/findings within the past 24 hours.

## 2024-05-10 NOTE — PHARMACY MED REC
"Admission Medication History     The home medication history was taken by Jessica Monahan.    You may go to "Admission" then "Reconcile Home Medications" tabs to review and/or act upon these items.     The home medication list has been updated by the Pharmacy department.   Please read ALL comments highlighted in yellow.   Please address this information as you see fit.    Feel free to contact us if you have any questions or require assistance.        Medications listed below were obtained from: Patient/family and Analytic software- [employer]  No current facility-administered medications on file prior to encounter.     Current Outpatient Medications on File Prior to Encounter   Medication Sig Dispense Refill    albuterol-ipratropium (DUO-NEB) 2.5 mg-0.5 mg/3 mL nebulizer solution Inhale 3 mLs into the lungs every 6 (six) hours as needed.      amLODIPine (NORVASC) 5 MG tablet Take 5 mg by mouth every evening.      beta-carotene,A,-vits C,E/mins (OCUVITE ORAL) Take 1 tablet by mouth once daily.      ciclopirox (PENLAC) 8 % Soln Apply 1 drop topically nightly.      DULoxetine (CYMBALTA) 60 MG capsule Take 60 mg by mouth once daily.      levothyroxine (SYNTHROID) 75 MCG tablet Take 75 mcg by mouth once daily.      nitroGLYCERIN (NITROSTAT) 0.4 MG SL tablet Place 0.4 mg under the tongue every 5 (five) minutes as needed for Chest pain.      pramipexole (MIRAPEX) 0.5 MG tablet Take 0.5 mg by mouth every evening.      RESTASIS 0.05 % ophthalmic emulsion Apply 1 drop to eye every 12 (twelve) hours.      rosuvastatin (CRESTOR) 20 MG tablet Take 1 tablet by mouth once daily.      ergocalciferol (ERGOCALCIFEROL) 50,000 unit Cap Take 50,000 Units by mouth every 7 days. SATURDAYS      hydroCHLOROthiazide (MICROZIDE) 12.5 mg capsule Take 12.5 mg by mouth once daily. (Patient not taking: Reported on 5/10/2024)      pantoprazole (PROTONIX) 40 MG tablet Take 40 mg by mouth once daily. (Patient not taking: Reported on 5/10/2024)      " solifenacin (VESICARE) 10 MG tablet Take 1 tablet (10 mg total) by mouth once daily. (Patient not taking: Reported on 5/10/2024) 30 tablet 11    traZODone (DESYREL) 50 MG tablet Take 50 mg by mouth every evening. (Patient not taking: Reported on 5/10/2024)      [DISCONTINUED] amLODIPine-benazepriL (LOTREL) 5-40 mg per capsule Take 1 capsule by mouth once daily.      [DISCONTINUED] ondansetron (ZOFRAN-ODT) 4 MG TbDL Take 1 tablet (4 mg total) by mouth every 6 (six) hours as needed (N/V). 20 tablet 0    [DISCONTINUED] promethazine (PHENERGAN) 25 MG tablet Take 1 tablet (25 mg total) by mouth every 6 (six) hours as needed for Nausea. 20 tablet 0       Potential issues to be addressed PRIOR TO DISCHARGE  Patient reported not taking the following medications: (Hydrochlorothiazide, Pantoprazole, Vesicare & Trazodone). These medications remain on the home medication list. Please address accordingly.     Jessica Monahan  EXT 1923                  .

## 2024-05-10 NOTE — ED PROVIDER NOTES
Encounter Date: 2024       History     Chief Complaint   Patient presents with    Chest Pain     Left sided CP radiating to left arm that started last night. Denies . Endorses nausea and h/a.Sent by Brunilda Pascual NP     Nausea    Headache     HPI  Patient with PMH HTN, HLD, angina, hypothyroidism, kidney stones presents to ED with intermittent substernal chest pressure radiating to her left shoulder and arm since last night.  She states she has had difficulty sleeping due to pain, but also because of sleep apnea which has been a chronic issue and she is awaiting to receive her CPAP.  She denies any orthopnea or lower extremity edema.  Reports mild shortness of breath with exertion.  She denies any fever, cough, cold or recent illness.  She complains of some mild intermittent right lower flank pain that has been chronic for a couple of weeks which she attributes to kidney stones.  She denies any urinary complaints.  Denies bowel changes.  States she is prescribed nitroglycerin for angina but had a headache so she did not take any of this last night.  She currently rates her symptoms at 2/10.  She can not identify any exacerbating or alleviating factors.  Her blood pressure has been running high over the last 1 week.  Her MD suggested adding amlodipine 5 mg at night which was due to start today.  She also takes combo amlodipine/benazepril 5/40mg in the morning.  Per chart review, had stress test in 2023 with EF of 74%.  No wall motion abnormalities noted on rest or stress images.  Stress test negative for ischemia by EKG criteria.  Slight decreased uptake noted in the inferior apical wall both rest and stress images.  More pronounced on rest images and stress.  Most likely representing apical thinning versus small scar without evidence of reversibility.  Review of patient's allergies indicates:   Allergen Reactions    Lyrica [pregabalin] Swelling    Sulfa (sulfonamide antibiotics) Swelling     Toradol [ketorolac] Swelling     Past Medical History:   Diagnosis Date    Anemia     Colon polyp     Hypothyroidism 12/10/2023    Kidney stone     Kidney stones 10/29/2012    Thyroid disease      Past Surgical History:   Procedure Laterality Date    BLADDER SURGERY      CHOLECYSTECTOMY      CYSTOSCOPY      HYSTERECTOMY      OOPHORECTOMY      tonsillectomy   1964     Family History   Problem Relation Name Age of Onset    Cancer Father      Cancer Paternal Grandfather      Heart disease Maternal Grandfather       Social History     Tobacco Use    Smoking status: Former     Current packs/day: 0.00     Types: Cigarettes     Start date: 1971     Quit date: 2001     Years since quittin.4   Substance Use Topics    Alcohol use: No    Drug use: No     Review of Systems   Constitutional:  Negative for fever.   HENT:  Negative for sore throat.    Respiratory:  Positive for shortness of breath.    Cardiovascular:  Positive for chest pain. Negative for leg swelling.   Gastrointestinal:  Negative for nausea.   Genitourinary:  Positive for flank pain. Negative for dysuria.   Musculoskeletal:  Negative for back pain.   Skin:  Negative for rash.   Neurological:  Negative for weakness.   Hematological:  Does not bruise/bleed easily.       Physical Exam     Initial Vitals [24 1436]   BP Pulse Resp Temp SpO2   (!) 156/76 71 20 98.3 °F (36.8 °C) 97 %      MAP       --         Physical Exam    Nursing note and vitals reviewed.  Constitutional: She appears well-developed and well-nourished. No distress.   HENT:   Head: Normocephalic and atraumatic.   Mouth/Throat: Oropharynx is clear and moist.   Eyes: EOM are normal. Pupils are equal, round, and reactive to light.   Neck: Neck supple.   Normal range of motion.  Cardiovascular:  Normal rate and regular rhythm.           Pulmonary/Chest: Breath sounds normal. No respiratory distress.   Abdominal: Abdomen is soft. There is no abdominal tenderness.   No CVA tenderness  There is no rebound and no guarding.   Musculoskeletal:         General: No tenderness or edema. Normal range of motion.      Cervical back: Normal range of motion and neck supple.     Neurological: She is alert and oriented to person, place, and time. She has normal strength. No cranial nerve deficit. GCS score is 15. GCS eye subscore is 4. GCS verbal subscore is 5. GCS motor subscore is 6.   Skin: Skin is warm and dry. Capillary refill takes less than 2 seconds. No rash noted.   Psychiatric: She has a normal mood and affect. Thought content normal.         ED Course   Procedures  Labs Reviewed   CBC W/ AUTO DIFFERENTIAL - Abnormal; Notable for the following components:       Result Value    RBC 3.65 (*)     Hemoglobin 11.2 (*)     Hematocrit 33.5 (*)     All other components within normal limits   COMPREHENSIVE METABOLIC PANEL - Abnormal; Notable for the following components:    Alkaline Phosphatase 43 (*)     Anion Gap 6 (*)     All other components within normal limits   URINALYSIS, REFLEX TO URINE CULTURE - Abnormal; Notable for the following components:    Urobilinogen, UA 2.0-3.0 (*)     All other components within normal limits    Narrative:     Specimen Source->Urine   TROPONIN I HIGH SENSITIVITY   TROPONIN I HIGH SENSITIVITY   B-TYPE NATRIURETIC PEPTIDE   HEMOGLOBIN A1C   CBC W/ AUTO DIFFERENTIAL   COMPREHENSIVE METABOLIC PANEL   TROPONIN I HIGH SENSITIVITY     EKG Readings: (Independently Interpreted)   Rhythm: Normal Sinus Rhythm. Ectopy: No Ectopy. Conduction: Normal. ST Segments: Normal ST Segments. T Waves: Normal. Clinical Impression: Normal Sinus Rhythm     ECG Results              EKG 12-lead (In process)        Collection Time Result Time QRS Duration OHS QTC Calculation    05/09/24 14:30:48 05/09/24 14:49:15 82 428                     In process by Interface, Lab In [facility] (05/09/24 14:49:19)                   Narrative:    Test Reason : R07.9,    Vent. Rate : 074 BPM     Atrial Rate : 074 BPM      P-R Int : 160 ms          QRS Dur : 082 ms      QT Int : 386 ms       P-R-T Axes : 070 042 059 degrees     QTc Int : 428 ms    Normal sinus rhythm  Cannot rule out Anterior infarct ,age undetermined  Abnormal ECG  When compared with ECG of 10-DEC-2023 16:29,  No significant change was found    Referred By: [email or web address]   SELF           Confirmed By:                                   Imaging Results              X-Ray Chest PA And Lateral (Final result)  Result time 05/09/24 14:59:40   Procedure changed from X-Ray Chest 1 View     Final result by Mariano Gibson MD (05/09/24 14:59:40)                   Impression:      No acute pulmonary process.      Electronically signed by: Mariano Gibson  Date:    05/09/2024  Time:    14:59               Narrative:    EXAMINATION:  XR CHEST PA AND LATERAL    CLINICAL HISTORY:  chest pain; Chest pain, unspecified    COMPARISON:  None available    FINDINGS:  Cardiac silhouette size is within normal limits.  Lungs are normally expanded and clear.  No pleural fluid or pneumothorax.  No acute osseous abnormality.  Status post cervical ACDF.                                    X-Rays:   Independently Interpreted Readings:   Other Readings:  Chest x-ray independently reviewed by me.  Agree with radiology read of no acute process.    Medications   sodium chloride 0.9% flush 10 mL (has no administration in time range)   melatonin tablet 6 mg (has no administration in time range)   heparin (porcine) injection 5,000 Units (has no administration in time range)   acetaminophen tablet 650 mg (has no administration in time range)   HYDROcodone-acetaminophen 5-325 mg per tablet 1 tablet (has no administration in time range)   polyethylene glycol packet 17 g (has no administration in time range)   senna-docusate 8.6-50 mg per tablet 1 tablet (has no administration in time range)   ondansetron injection 4 mg (has no administration in time range)   acetaminophen tablet 650 mg (has no administration  in time range)   morphine injection 2 mg (2 mg Intravenous Given 5/9/24 2149)   ondansetron injection 4 mg (4 mg Intravenous Given 5/9/24 2149)   amLODIPine tablet 5 mg (5 mg Oral Given 5/9/24 2149)   hydrALAZINE injection 10 mg (10 mg Intravenous Given 5/10/24 0112)     Medical Decision Making  Patient presents to ED as above.  She is hypertensive with otherwise stable vitals.  EKG without evidence acute ischemia or arrhythmia.  Differential includes but not limited to ACS, hypertensive urgency/emergency, dissection, PE, CHF, pneumonia.  All labs reviewed by me and significant for normal WBC count, stable renal function, negative troponin, normal BNP.  UA negative for UTI.  She was treated with low-dose morphine for her pain and given oral amlodipine with little change in her blood pressure.  She was then given 10 mg of IV hydralazine with some improvement.  In light of her age, elevated blood pressure and heart score of 4 will admit to the hospitalist for further management and workup.    Amount and/or Complexity of Data Reviewed  External Data Reviewed: labs, radiology and notes.     Details: Patient with negative cardiac stress test in October of 2023.  Labs: ordered. Decision-making details documented in ED Course.     Details: CBC, CMP, MG, PT/INR, HS TROP, BNP, UA  Radiology: ordered and independent interpretation performed. Decision-making details documented in ED Course.  ECG/medicine tests: ordered and independent interpretation performed. Decision-making details documented in ED Course.    Risk  Prescription drug management.  Decision regarding hospitalization.      Additional MDM:   Heart Score:    History:          Moderately suspicious.  ECG:             Normal  Age:               >65 years  Risk factors: 1-2 risk factors  Troponin:       Less than or equal to normal limit  Heart Score = 4                                       Clinical Impression:  Final diagnoses:  [R07.9] Chest pain  [R07.9] Chest  pain, unspecified type (Primary)  [I16.0] Hypertensive urgency          ED Disposition Condition    Admit                 Tonya Villarreal MD  05/10/24 [id number]

## 2024-05-10 NOTE — HPI
Ms. Alicea  is a 73 y.o. female with PMH significant for PMH HTN, LORENZO, HLD, angina, hypothyroidism, kidney stones who is being evaluated by the Hospital Medicine service after developing intermittent substernal chest pressure rated 2/10 in severity radiating to her left shoulder and arm since the previous night. Pt denied orthopnea or lower extremity swelling. She admits to some shortness of breath on exertion.  She also complained of right lower flank pain due to her history of kidney stones. She denies any urinary frequency, urgency, burning or retention. Patient admits to headache but denies visual changes. She had been prescribed Nitroglycerin for angina but did not take it because she feels it would worsen her headache. She has elevated BP but takes amlodipine/benazepril 5/40mg in the morning and her PCP suggested she take Amlodiipine 5mg at night as well. She had a stress test in October of 2023 with EF of 74%.  No wall motion abnormalities noted on rest or stress images.  Stress test negative for ischemia by EKG criteria.  Slight decreased uptake noted in the inferior apical wall both rest. She denies any focal weakness, numbness tingling or paresthesias.  Denies any abdominal pain.  Denies melena hematochezia or any GI bleeding.  Denies diarrhea or constipation.  Patient denies headache or visual changes.  Denies any cough or cold symptoms.  Denies dysuria, frequency or urgency.  Denies any other problems or complaints.    Pt denies history of afib, denies personal hx of blood clots. She lives with her  and performs all ADLs independently. Pt denies alcohol and denies recreational drug use, denies smoking.

## 2024-05-10 NOTE — CARE UPDATE
Patient seen and examined.  Continues to have intermittent chest pain.  Troponins have been negative since admission.  Awaiting Cardiology consultation, echocardiogram.  Agree with plan of care as delineated in H and P

## 2024-05-11 LAB
ALBUMIN SERPL BCP-MCNC: 3.8 G/DL (ref 3.5–5.2)
ALP SERPL-CCNC: 36 U/L (ref 55–135)
ALT SERPL W/O P-5'-P-CCNC: 18 U/L (ref 10–44)
ANION GAP SERPL CALC-SCNC: 4 MMOL/L (ref 8–16)
AST SERPL-CCNC: 16 U/L (ref 10–40)
BASOPHILS # BLD AUTO: 0.02 K/UL (ref 0–0.2)
BASOPHILS NFR BLD: 0.2 % (ref 0–1.9)
BILIRUB SERPL-MCNC: 0.9 MG/DL (ref 0.1–1)
BUN SERPL-MCNC: 20 MG/DL (ref 8–23)
CALCIUM SERPL-MCNC: 8.9 MG/DL (ref 8.7–10.5)
CHLORIDE SERPL-SCNC: 108 MMOL/L (ref 95–110)
CO2 SERPL-SCNC: 27 MMOL/L (ref 23–29)
CREAT SERPL-MCNC: 1 MG/DL (ref 0.5–1.4)
DIFFERENTIAL METHOD BLD: ABNORMAL
EOSINOPHIL # BLD AUTO: 0.2 K/UL (ref 0–0.5)
EOSINOPHIL NFR BLD: 2.3 % (ref 0–8)
ERYTHROCYTE [DISTWIDTH] IN BLOOD BY AUTOMATED COUNT: 14 % (ref 11.5–14.5)
EST. GFR  (NO RACE VARIABLE): 59.9 ML/MIN/1.73 M^2
GLUCOSE SERPL-MCNC: 110 MG/DL (ref 70–110)
HCT VFR BLD AUTO: 31 % (ref 37–48.5)
HGB BLD-MCNC: 10.3 G/DL (ref 12–16)
IMM GRANULOCYTES # BLD AUTO: 0.02 K/UL (ref 0–0.04)
IMM GRANULOCYTES NFR BLD AUTO: 0.2 % (ref 0–0.5)
LYMPHOCYTES # BLD AUTO: 1.7 K/UL (ref 1–4.8)
LYMPHOCYTES NFR BLD: 20.7 % (ref 18–48)
MCH RBC QN AUTO: 30.4 PG (ref 27–31)
MCHC RBC AUTO-ENTMCNC: 33.2 G/DL (ref 32–36)
MCV RBC AUTO: 91 FL (ref 82–98)
MONOCYTES # BLD AUTO: 0.7 K/UL (ref 0.3–1)
MONOCYTES NFR BLD: 8.3 % (ref 4–15)
NEUTROPHILS # BLD AUTO: 5.6 K/UL (ref 1.8–7.7)
NEUTROPHILS NFR BLD: 68.3 % (ref 38–73)
NRBC BLD-RTO: 0 /100 WBC
PLATELET # BLD AUTO: 168 K/UL (ref 150–450)
PMV BLD AUTO: 10.2 FL (ref 9.2–12.9)
POTASSIUM SERPL-SCNC: 3.9 MMOL/L (ref 3.5–5.1)
PROT SERPL-MCNC: 6.2 G/DL (ref 6–8.4)
RBC # BLD AUTO: 3.39 M/UL (ref 4–5.4)
SODIUM SERPL-SCNC: 139 MMOL/L (ref 136–145)
WBC # BLD AUTO: 8.17 K/UL (ref 3.9–12.7)

## 2024-05-11 PROCEDURE — 94799 UNLISTED PULMONARY SVC/PX: CPT

## 2024-05-11 PROCEDURE — 99406 BEHAV CHNG SMOKING 3-10 MIN: CPT

## 2024-05-11 PROCEDURE — 80053 COMPREHEN METABOLIC PANEL: CPT | Performed by: INTERNAL MEDICINE

## 2024-05-11 PROCEDURE — 99233 SBSQ HOSP IP/OBS HIGH 50: CPT | Mod: ,,, | Performed by: GENERAL PRACTICE

## 2024-05-11 PROCEDURE — 85025 COMPLETE CBC W/AUTO DIFF WBC: CPT | Performed by: INTERNAL MEDICINE

## 2024-05-11 PROCEDURE — 94660 CPAP INITIATION&MGMT: CPT

## 2024-05-11 PROCEDURE — 36415 COLL VENOUS BLD VENIPUNCTURE: CPT | Performed by: INTERNAL MEDICINE

## 2024-05-11 PROCEDURE — 63600175 PHARM REV CODE 636 W HCPCS: Performed by: INTERNAL MEDICINE

## 2024-05-11 PROCEDURE — 25000003 PHARM REV CODE 250: Performed by: NURSE PRACTITIONER

## 2024-05-11 PROCEDURE — 99900035 HC TECH TIME PER 15 MIN (STAT)

## 2024-05-11 PROCEDURE — 25000003 PHARM REV CODE 250: Performed by: INTERNAL MEDICINE

## 2024-05-11 PROCEDURE — 21400001 HC TELEMETRY ROOM

## 2024-05-11 PROCEDURE — 94761 N-INVAS EAR/PLS OXIMETRY MLT: CPT

## 2024-05-11 PROCEDURE — 99900031 HC PATIENT EDUCATION (STAT)

## 2024-05-11 PROCEDURE — 25000003 PHARM REV CODE 250: Performed by: HOSPITALIST

## 2024-05-11 RX ORDER — SODIUM CHLORIDE 0.9 % (FLUSH) 0.9 %
2 SYRINGE (ML) INJECTION
Status: DISCONTINUED | OUTPATIENT
Start: 2024-05-11 | End: 2024-05-14 | Stop reason: HOSPADM

## 2024-05-11 RX ORDER — DIPHENHYDRAMINE HCL 25 MG
50 CAPSULE ORAL
Status: DISCONTINUED | OUTPATIENT
Start: 2024-05-11 | End: 2024-05-14 | Stop reason: HOSPADM

## 2024-05-11 RX ADMIN — HEPARIN SODIUM 5000 UNITS: 5000 INJECTION INTRAVENOUS; SUBCUTANEOUS at 02:05

## 2024-05-11 RX ADMIN — DULOXETINE HYDROCHLORIDE 60 MG: 30 CAPSULE, DELAYED RELEASE ORAL at 09:05

## 2024-05-11 RX ADMIN — HYDROCODONE BITARTRATE AND ACETAMINOPHEN 1 TABLET: 5; 325 TABLET ORAL at 09:05

## 2024-05-11 RX ADMIN — RANOLAZINE 500 MG: 500 TABLET, FILM COATED, EXTENDED RELEASE ORAL at 09:05

## 2024-05-11 RX ADMIN — PRAMIPEXOLE DIHYDROCHLORIDE 0.5 MG: 0.5 TABLET ORAL at 09:05

## 2024-05-11 RX ADMIN — ATORVASTATIN CALCIUM 40 MG: 40 TABLET, FILM COATED ORAL at 08:05

## 2024-05-11 RX ADMIN — HEPARIN SODIUM 5000 UNITS: 5000 INJECTION INTRAVENOUS; SUBCUTANEOUS at 05:05

## 2024-05-11 RX ADMIN — ISOSORBIDE MONONITRATE 30 MG: 30 TABLET, EXTENDED RELEASE ORAL at 08:05

## 2024-05-11 RX ADMIN — LEVOTHYROXINE SODIUM 75 MCG: 0.03 TABLET ORAL at 05:05

## 2024-05-11 RX ADMIN — SENNOSIDES AND DOCUSATE SODIUM 1 TABLET: 50; 8.6 TABLET ORAL at 09:05

## 2024-05-11 RX ADMIN — HEPARIN SODIUM 5000 UNITS: 5000 INJECTION INTRAVENOUS; SUBCUTANEOUS at 09:05

## 2024-05-11 RX ADMIN — AMLODIPINE BESYLATE 5 MG: 5 TABLET ORAL at 09:05

## 2024-05-11 RX ADMIN — LISINOPRIL 40 MG: 20 TABLET ORAL at 08:05

## 2024-05-11 RX ADMIN — HYDROCODONE BITARTRATE AND ACETAMINOPHEN 1 TABLET: 5; 325 TABLET ORAL at 05:05

## 2024-05-11 RX ADMIN — RANOLAZINE 500 MG: 500 TABLET, FILM COATED, EXTENDED RELEASE ORAL at 08:05

## 2024-05-11 RX ADMIN — SENNOSIDES AND DOCUSATE SODIUM 1 TABLET: 50; 8.6 TABLET ORAL at 08:05

## 2024-05-11 RX ADMIN — AMLODIPINE BESYLATE 5 MG: 5 TABLET ORAL at 08:05

## 2024-05-11 RX ADMIN — HYDROCHLOROTHIAZIDE 12.5 MG: 12.5 TABLET ORAL at 08:05

## 2024-05-11 NOTE — PLAN OF CARE
Problem: Adult Inpatient Plan of Care  Goal: Plan of Care Review  Outcome: Progressing  Goal: Patient-Specific Goal (Individualized)  Outcome: Progressing  Goal: Absence of Hospital-Acquired Illness or Injury  Outcome: Progressing  Goal: Optimal Comfort and Wellbeing  Outcome: Progressing  Goal: Readiness for Transition of Care  Outcome: Progressing     Problem: Fall Injury Risk  Goal: Absence of Fall and Fall-Related Injury  Outcome: Progressing     Problem: Chest Pain  Goal: Resolution of Chest Pain Symptoms  Outcome: Progressing

## 2024-05-11 NOTE — PROGRESS NOTES
[facility]  Department of Cardiology  Consult Note      PATIENT NAME: Darshana Alicea    MRN: [id number]  TODAY'S DATE: 05/11/2024  ADMIT DATE: 5/9/2024                          CONSULT REQUESTED BY: Isidro Galeana MD    SUBJECTIVE     PRINCIPAL PROBLEM: Chest pain    INTERVAL HISTORY  5/11/24  BP improved  Episodes of CP intermittently  Episode of dyspnea lasting 5 min yesterday  Had angiogram 2007 that was reportedly OK    HPI:    Patient is a 73-year-old female who presented to the emergency room with complaints of substernal chest discomfort radiating to her left shoulder.  She also had some shortness of breath with exertion.  Patient noted to have reproducible chest pain on evaluation.  She has had a negative stress test last year and follows with Cardiology outpatient.  Patient had an angiogram in 2007 which showed small vessels but patent coronaries at that time.       REASON FOR CONSULT:  From Hospitalist H&P: Ms. Alicea  is a 73 y.o. female with PMH significant for PMH HTN, LORENZO, HLD, angina, hypothyroidism, kidney stones who is being evaluated by the Hospital Medicine service after developing intermittent substernal chest pressure rated 2/10 in severity radiating to her left shoulder and arm since the previous night. Pt denied orthopnea or lower extremity swelling. She admits to some shortness of breath on exertion.  She also complained of right lower flank pain due to her history of kidney stones. She denies any urinary frequency, urgency, burning or retention. Patient admits to headache but denies visual changes. She had been prescribed Nitroglycerin for angina but did not take it because she feels it would worsen her headache. She has elevated BP but takes amlodipine/benazepril 5/40mg in the morning and her PCP suggested she take Amlodiipine 5mg at night as well. She had a stress test in October of 2023 with EF of 74%.  No wall motion abnormalities noted on rest or stress images.  Stress test  negative for ischemia by EKG criteria.  Slight decreased uptake noted in the inferior apical wall both rest. She denies any focal weakness, numbness tingling or paresthesias.  Denies any abdominal pain.  Denies melena hematochezia or any GI bleeding.  Denies diarrhea or constipation.  Patient denies headache or visual changes.  Denies any cough or cold symptoms.  Denies dysuria, frequency or urgency.  Denies any other problems or complaints.     Pt denies history of afib, denies personal hx of blood clots. She lives with her  and performs all ADLs independently. Pt denies alcohol and denies recreational drug use, denies smoking.         Review of patient's allergies indicates:   Allergen Reactions    Lyrica [pregabalin] Swelling    Sulfa (sulfonamide antibiotics) Swelling    Toradol [ketorolac] Swelling       Past Medical History:   Diagnosis Date    Anemia     Colon polyp     Hypothyroidism 12/10/2023    Kidney stone     Kidney stones 10/29/2012    Thyroid disease      Past Surgical History:   Procedure Laterality Date    BLADDER SURGERY      CHOLECYSTECTOMY      CYSTOSCOPY      HYSTERECTOMY      OOPHORECTOMY      tonsillectomy   1964     Social History     Tobacco Use    Smoking status: Former     Current packs/day: 0.00     Types: Cigarettes     Start date: 1971     Quit date: 2001     Years since quittin.4   Substance Use Topics    Alcohol use: No    Drug use: No        REVIEW OF SYSTEMS  Per HPI    OBJECTIVE     VITAL SIGNS (Most Recent)  Temp: 97.6 °F (36.4 °C) (24 1058)  Pulse: 61 (24 1058)  Resp: 16 (24 1058)  BP: (!) 104/51 (24 1058)  SpO2: 95 % (24 1058)    VENTILATION STATUS  Resp: 16 (24 1058)  SpO2: 95 % (24 1058)           I & O (Last 24H):  Intake/Output Summary (Last 24 hours) at 2024 1357  Last data filed at 2024 1130  Gross per 24 hour   Intake 1440 ml   Output 500 ml   Net 940 ml       WEIGHTS  Wt Readings from Last 1  Encounters:   05/10/24 0257 88.6 kg (195 lb 6.4 oz)   05/09/24 1436 86.2 kg (190 lb)       PHYSICAL EXAM  CONSTITUTIONAL: resting comfortably in bed;   HEENT: Normocephalic, atraumatic,pupils reactive to light                 NECK:  No JVD no carotid bruit  CVS: S1S2+, RRR  LUNGS: Clear  ABDOMEN: Soft, NT, BS+  EXTREMITIES: No cyanosis, edema  : No medina catheter  NEURO: AAO X 3  PSY: Normal affect      HOME MEDICATIONS:  No current facility-administered medications on file prior to encounter.     Current Outpatient Medications on File Prior to Encounter   Medication Sig Dispense Refill    albuterol-ipratropium (DUO-NEB) 2.5 mg-0.5 mg/3 mL nebulizer solution Inhale 3 mLs into the lungs every 6 (six) hours as needed.      amLODIPine (NORVASC) 5 MG tablet Take 5 mg by mouth every evening.      beta-carotene,A,-vits C,E/mins (OCUVITE ORAL) Take 1 tablet by mouth once daily.      ciclopirox (PENLAC) 8 % Soln Apply 1 drop topically nightly.      DULoxetine (CYMBALTA) 60 MG capsule Take 60 mg by mouth once daily.      levothyroxine (SYNTHROID) 75 MCG tablet Take 75 mcg by mouth once daily.      nitroGLYCERIN (NITROSTAT) 0.4 MG SL tablet Place 0.4 mg under the tongue every 5 (five) minutes as needed for Chest pain.      pramipexole (MIRAPEX) 0.5 MG tablet Take 0.5 mg by mouth every evening.      RESTASIS 0.05 % ophthalmic emulsion Apply 1 drop to eye every 12 (twelve) hours.      rosuvastatin (CRESTOR) 20 MG tablet Take 1 tablet by mouth once daily.      ergocalciferol (ERGOCALCIFEROL) 50,000 unit Cap Take 50,000 Units by mouth every 7 days. SATURDAYS      hydroCHLOROthiazide (MICROZIDE) 12.5 mg capsule Take 12.5 mg by mouth once daily. (Patient not taking: Reported on 5/10/2024)      pantoprazole (PROTONIX) 40 MG tablet Take 40 mg by mouth once daily. (Patient not taking: Reported on 5/10/2024)      solifenacin (VESICARE) 10 MG tablet Take 1 tablet (10 mg total) by mouth once daily. (Patient not taking: Reported on  "5/10/2024) 30 tablet 11    traZODone (DESYREL) 50 MG tablet Take 50 mg by mouth every evening. (Patient not taking: Reported on 5/10/2024)         SCHEDULED MEDS:   amLODIPine  5 mg Oral BID    atorvastatin  40 mg Oral Daily    DULoxetine  60 mg Oral QHS    heparin (porcine)  5,000 Units Subcutaneous Q8H    hydroCHLOROthiazide  12.5 mg Oral Daily    isosorbide mononitrate  30 mg Oral Daily    levothyroxine  75 mcg Oral Before breakfast    lisinopriL  40 mg Oral Daily    polyethylene glycol  17 g Oral Daily    pramipexole  0.5 mg Oral QHS    ranolazine  500 mg Oral BID    senna-docusate 8.6-50 mg  1 tablet Oral BID       CONTINUOUS INFUSIONS:   sodium chloride 0.9%   Intravenous Continuous           PRN MEDS:  Current Facility-Administered Medications:     acetaminophen, 650 mg, Oral, Q8H PRN    acetaminophen, 650 mg, Oral, Q8H PRN    diphenhydrAMINE, 50 mg, Oral, On Call Procedure    HYDROcodone-acetaminophen, 1 tablet, Oral, Q4H PRN    melatonin, 6 mg, Oral, Nightly PRN    ondansetron, 4 mg, Intravenous, Q8H PRN    sodium chloride 0.9%, 10 mL, Intravenous, PRN    sodium chloride 0.9%, 2 mL, Intravenous, PRN    LABS AND DIAGNOSTICS     CBC LAST 3 DAYS  Recent Labs   Lab 05/09/24  1646 05/10/24  0531 05/11/24  0506   WBC 7.15 9.43 8.17   RBC 3.65* 3.50* 3.39*   HGB 11.2* 10.8* 10.3*   HCT 33.5* 32.2* 31.0*   MCV 92 92 91   MCH 30.7 30.9 30.4   MCHC 33.4 33.5 33.2   RDW 13.7 14.0 14.0    147* 168   MPV 10.2 10.4 10.2   GRAN 61.2  4.4 69.1  6.5 68.3  5.6   LYMPH 24.1  1.7 19.6  1.9 20.7  1.7   MONO 11.2  0.8 8.2  0.8 8.3  0.7   BASO 0.03 0.04 0.02   NRBC 0 0 0       COAGULATION LAST 3 DAYS  No results for input(s): "LABPT", "INR", "APTT" in the last 168 hours.    CHEMISTRY LAST 3 DAYS  Recent Labs   Lab 05/09/24  1646 05/10/24  0531 05/11/24  0506    141 139   K 3.7 3.4* 3.9    108 108   CO2 28 29 27   ANIONGAP 6* 4* 4*   BUN 14 17 20   CREATININE 0.9 1.0 1.0   GLU 78 127* 110   CALCIUM 8.7 " "8.5* 8.9   ALBUMIN 4.3 3.8 3.8   PROT 6.8 6.1 6.2   ALKPHOS 43* 41* 36*   ALT 13 25 18   AST 15 34 16   BILITOT 0.8 0.8 0.9       CARDIAC PROFILE LAST 3 DAYS  Recent Labs   Lab 05/09/24  2114 05/10/24  0229 05/10/24  0531   BNP 31  --   --    TROPONINIHS 3.1 4.5 4.1  4.2       ENDOCRINE LAST 3 DAYS  Recent Labs   Lab 05/10/24  0531   TSH 2.476       LAST ARTERIAL BLOOD GAS  ABG  No results for input(s): "PH", "PO2", "PCO2", "HCO3", "BE" in the last 168 hours.    LAST 7 DAYS MICROBIOLOGY   Microbiology Results (last 7 days)       ** No results found for the last 168 hours. **            MOST RECENT IMAGING  Echo    Left Ventricle: The left ventricle is normal in size. Moderately   increased wall thickness. There is moderate concentric hypertrophy. Normal   wall motion. There is normal systolic function with a visually estimated   ejection fraction of 60 - 65%. There is normal diastolic function.    Right Ventricle: Normal right ventricular cavity size. Systolic   function is normal.    Mitral Valve: There is mild regurgitation.    Tricuspid Valve: There is mild regurgitation.    IVC/SVC: Normal venous pressure at 3 mmHg.      ECHOCARDIOGRAM RESULTS (last 5)  No results found for this or any previous visit.      CURRENT/PREVIOUS VISIT EKG  Results for orders placed or performed during the hospital encounter of 05/09/24   EKG 12-lead    Collection Time: 05/10/24 11:54 AM   Result Value Ref Range    QRS Duration 84 ms    OHS QTC Calculation 432 ms    Narrative    Test Reason : R07.9,    Vent. Rate : 069 BPM     Atrial Rate : 069 BPM     P-R Int : 168 ms          QRS Dur : 084 ms      QT Int : 404 ms       P-R-T Axes : 078 061 075 degrees     QTc Int : 432 ms    Normal sinus rhythm  Normal ECG  When compared with ECG of 09-MAY-2024 14:30,  No significant change was found    Referred By: [email or web address]   SELF           Confirmed By:            ASSESSMENT/PLAN:     Active Hospital Problems    Diagnosis    *Chest pain    " Chronic right flank pain    Hypertensive urgency    Hypothyroidism    Stage 3 chronic kidney disease    Kidney stones       ASSESSMENT & PLAN:     Chest pain  HTN urgency  CKD      RECOMMENDATIONS:    HS Troponin levels have been negative   Continue amlodipine 5 mg p.o. b.i.d., hydrochlorothiazide 12.5 mg p.o. daily, lisinopril 40 mg p.o. daily,   Heart rate is in the 60s primarily with occasionally dropping down into the 50s, hold off on adding any beta-blockers.  Continue Imdur 30 mg p.o. daily.Ranexa 500 mg p.o. b.i.d.  Add aldactone 25 mg daily  Will proceed with angiogram on Monday with Dr Baeza.   Discussed with patient the risks and benefits of the procedure including, but not limited to, the following 1:1000 risk of Heart attack, stroke and death with 3-5% Risk of bleeding, vessel damage, and the need for emergent CABG Surgery.  All questions have been answered to patient's satisfaction.  Informed consent obtained Will keep her nothing by mouth post midnight on Kvng night and proceed with the coronary angiography possible PCI Monday morning.    7.   Add anemia panel    Tsering Clark NP  Date of Service: 05/11/2024  2:00 PM    I have personally interviewed and examined the patient, I have reviewed the Nurse Practitioner's history and physical, assessment, and plan. I have personally evaluated the patient at bedside and agree with the findings and made appropriate changes as necessary in recommendations.    PATIENT IS REQUESTING HEART CATHETERIZATION.  I THINK MUCH FOR PROBLEM IS HYPERTENSIVE AND LABILE HYPERTENSION.  WE ADDED ALDACTONE TO HER REGIMEN  PLAN FOR HEART CATHETERIZATION MONDAY  IF IT IS NEGATIVE WE SHOULD BE ABLE TO DROP THE IMDUR AND RANEXA AND SIMPLIFY HER HYPERTENSION REGIMEN      Dr. Norma MD  Department of Cardiology  [facility]  Date of service: 5/11/24

## 2024-05-11 NOTE — SUBJECTIVE & OBJECTIVE
Interval History:  Patient seen and examined.  No acute events overnight.  Reports that she does not feel well today.  She is currently complaining of chest pain which is radiating to her left jaw.  It is rated 2/10 scale.  She states it feels like heaviness.  Patient tells me that she was seen yesterday by Cardiology team and was offered angiogram versus going home.  She is opted for angiogram, although cardiology notes states patient to be discharged home.  I have asked her nurse to reach out to Cardiology team to make them aware.      Review of Systems   Constitutional:  Positive for activity change.   Respiratory:  Positive for shortness of breath.    Cardiovascular:  Positive for chest pain (radiation to left jaw).   Musculoskeletal:  Positive for myalgias.   Neurological:  Positive for headaches.     Objective:     Vital Signs (Most Recent):  Temp: 97.9 °F (36.6 °C) (05/11/24 0729)  Pulse: 62 (05/11/24 0729)  Resp: 18 (05/11/24 0729)  BP: (!) 140/58 (05/11/24 0729)  SpO2: 95 % (05/11/24 0729) Vital Signs (24h Range):  Temp:  [97.9 °F (36.6 °C)-98.5 °F (36.9 °C)] 97.9 °F (36.6 °C)  Pulse:  [56-66] 62  Resp:  [16-18] 18  SpO2:  [95 %-97 %] 95 %  BP: (128-146)/(58-75) 140/58     Weight: 88.6 kg (195 lb 6.4 oz)  Body mass index is 33.02 kg/m².    Intake/Output Summary (Last 24 hours) at 5/11/2024 1018  Last data filed at 5/11/2024 0804  Gross per 24 hour   Intake 1560 ml   Output 700 ml   Net 860 ml         Physical Exam  Constitutional:       General: She is not in acute distress.     Appearance: She is not ill-appearing, toxic-appearing or diaphoretic.   Cardiovascular:      Rate and Rhythm: Normal rate and regular rhythm.      Pulses: Normal pulses.      Heart sounds: Normal heart sounds.   Pulmonary:      Effort: Pulmonary effort is normal. No respiratory distress.      Breath sounds: Normal breath sounds.   Abdominal:      General: Bowel sounds are normal. There is no distension.      Palpations: Abdomen is  soft.      Tenderness: There is no abdominal tenderness.   Skin:     General: Skin is warm and dry.      Capillary Refill: Capillary refill takes less than 2 seconds.      Coloration: Skin is not jaundiced or pale.   Neurological:      Mental Status: She is alert and oriented to person, place, and time. Mental status is at baseline.             Significant Labs: All pertinent labs within the past 24 hours have been reviewed.  CBC:   Recent Labs   Lab 05/09/24  1646 05/10/24  0531 05/11/24  0506   WBC 7.15 9.43 8.17   HGB 11.2* 10.8* 10.3*   HCT 33.5* 32.2* 31.0*    147* 168     CMP:   Recent Labs   Lab 05/09/24  1646 05/10/24  0531 05/11/24  0506    141 139   K 3.7 3.4* 3.9    108 108   CO2 28 29 27   GLU 78 127* 110   BUN 14 17 20   CREATININE 0.9 1.0 1.0   CALCIUM 8.7 8.5* 8.9   PROT 6.8 6.1 6.2   ALBUMIN 4.3 3.8 3.8   BILITOT 0.8 0.8 0.9   ALKPHOS 43* 41* 36*   AST 15 34 16   ALT 13 25 18   ANIONGAP 6* 4* 4*       Significant Imaging: I have reviewed all pertinent imaging results/findings within the past 24 hours.

## 2024-05-11 NOTE — PROGRESS NOTES
[facility] Medicine  Progress Note    Patient Name: Darshana Alicea  MRN: [id number]  Patient Class: IP- Inpatient   Admission Date: 5/9/2024  Length of Stay: 1 days  Attending Physician: Isidro Galeana MD  Primary Care Provider: Mitch Marquez MD        Subjective:     Principal Problem:Chest pain        HPI:  Ms. Alicea  is a 73 y.o. female with PMH significant for PMH HTN, LORENZO, HLD, angina, hypothyroidism, kidney stones who is being evaluated by the Hospital Medicine service after developing intermittent substernal chest pressure rated 2/10 in severity radiating to her left shoulder and arm since the previous night. Pt denied orthopnea or lower extremity swelling. She admits to some shortness of breath on exertion.  She also complained of right lower flank pain due to her history of kidney stones. She denies any urinary frequency, urgency, burning or retention. Patient admits to headache but denies visual changes. She had been prescribed Nitroglycerin for angina but did not take it because she feels it would worsen her headache. She has elevated BP but takes amlodipine/benazepril 5/40mg in the morning and her PCP suggested she take Amlodiipine 5mg at night as well. She had a stress test in October of 2023 with EF of 74%.  No wall motion abnormalities noted on rest or stress images.  Stress test negative for ischemia by EKG criteria.  Slight decreased uptake noted in the inferior apical wall both rest. She denies any focal weakness, numbness tingling or paresthesias.  Denies any abdominal pain.  Denies melena hematochezia or any GI bleeding.  Denies diarrhea or constipation.  Patient denies headache or visual changes.  Denies any cough or cold symptoms.  Denies dysuria, frequency or urgency.  Denies any other problems or complaints.    Pt denies history of afib, denies personal hx of blood clots. She lives with her  and performs all ADLs independently. Pt denies alcohol and denies  recreational drug use, denies smoking.      Overview/Hospital Course:  No notes on file    Interval History:  Patient seen and examined.  No acute events overnight.  Reports that she does not feel well today.  She is currently complaining of chest pain which is radiating to her left jaw.  It is rated 2/10 scale.  She states it feels like heaviness.  Patient tells me that she was seen yesterday by Cardiology team and was offered angiogram versus going home.  She is opted for angiogram, although cardiology notes states patient to be discharged home.  I have asked her nurse to reach out to Cardiology team to make them aware.      Review of Systems   Constitutional:  Positive for activity change.   Respiratory:  Positive for shortness of breath.    Cardiovascular:  Positive for chest pain (radiation to left jaw).   Musculoskeletal:  Positive for myalgias.   Neurological:  Positive for headaches.     Objective:     Vital Signs (Most Recent):  Temp: 97.9 °F (36.6 °C) (05/11/24 0729)  Pulse: 62 (05/11/24 0729)  Resp: 18 (05/11/24 0729)  BP: (!) 140/58 (05/11/24 0729)  SpO2: 95 % (05/11/24 0729) Vital Signs (24h Range):  Temp:  [97.9 °F (36.6 °C)-98.5 °F (36.9 °C)] 97.9 °F (36.6 °C)  Pulse:  [56-66] 62  Resp:  [16-18] 18  SpO2:  [95 %-97 %] 95 %  BP: (128-146)/(58-75) 140/58     Weight: 88.6 kg (195 lb 6.4 oz)  Body mass index is 33.02 kg/m².    Intake/Output Summary (Last 24 hours) at 5/11/2024 1018  Last data filed at 5/11/2024 0804  Gross per 24 hour   Intake 1560 ml   Output 700 ml   Net 860 ml         Physical Exam  Constitutional:       General: She is not in acute distress.     Appearance: She is not ill-appearing, toxic-appearing or diaphoretic.   Cardiovascular:      Rate and Rhythm: Normal rate and regular rhythm.      Pulses: Normal pulses.      Heart sounds: Normal heart sounds.   Pulmonary:      Effort: Pulmonary effort is normal. No respiratory distress.      Breath sounds: Normal breath sounds.   Abdominal:       General: Bowel sounds are normal. There is no distension.      Palpations: Abdomen is soft.      Tenderness: There is no abdominal tenderness.   Skin:     General: Skin is warm and dry.      Capillary Refill: Capillary refill takes less than 2 seconds.      Coloration: Skin is not jaundiced or pale.   Neurological:      Mental Status: She is alert and oriented to person, place, and time. Mental status is at baseline.             Significant Labs: All pertinent labs within the past 24 hours have been reviewed.  CBC:   Recent Labs   Lab 05/09/24  1646 05/10/24  0531 05/11/24  0506   WBC 7.15 9.43 8.17   HGB 11.2* 10.8* 10.3*   HCT 33.5* 32.2* 31.0*    147* 168     CMP:   Recent Labs   Lab 05/09/24  1646 05/10/24  0531 05/11/24  0506    141 139   K 3.7 3.4* 3.9    108 108   CO2 28 29 27   GLU 78 127* 110   BUN 14 17 20   CREATININE 0.9 1.0 1.0   CALCIUM 8.7 8.5* 8.9   PROT 6.8 6.1 6.2   ALBUMIN 4.3 3.8 3.8   BILITOT 0.8 0.8 0.9   ALKPHOS 43* 41* 36*   AST 15 34 16   ALT 13 25 18   ANIONGAP 6* 4* 4*       Significant Imaging: I have reviewed all pertinent imaging results/findings within the past 24 hours.    Assessment/Plan:      * Chest pain  Chest pain R/O ACS  Unstable angina  --Start Ranolazine 500mg BID  --Pain control PRN  --2D echo.  --EKG.  --Cardiac enzymes.  --TSH.   --Cardiology consult - appreciate recommendations   --continue amlodipine, HCTZ, lisinopril, Ranexa, and Imdur  --await recommendations regarding angiogram      Hypertensive urgency  Patient has a current diagnosis of hypertensive urgency (without evidence of end organ damage) which is controlled.  Latest blood pressure and vitals reviewed-   Temp:  [97.9 °F (36.6 °C)-98.5 °F (36.9 °C)]   Pulse:  [56-66]   Resp:  [16-18]   BP: (128-146)/(58-75)   SpO2:  [95 %-97 %] .   Patient currently off IV antihypertensives.   Home meds for hypertension were reviewed and noted below.   Hypertension Medications                amLODIPine-benazepriL (LOTREL) 5-40 mg per capsule Take 1 capsule by mouth once daily.    hydroCHLOROthiazide (MICROZIDE) 12.5 mg capsule Take 12.5 mg by mouth once daily.    nitroGLYCERIN (NITROSTAT) 0.4 MG SL tablet Place 0.4 mg under the tongue every 5 (five) minutes as needed for Chest pain.            Medication adjustment for hospital antihypertensives is as follows- Resume home meds-Ranexa, lisinopril and Imdur added    Will aim for controlled BP reduction by medications noted above. Monitor and mitigate end organ damage as indicated.    Chronic right flank pain  Patient states she has a kidney stone that she is waiting to pass  Start IV hydration      Hypothyroidism  Continue Levothyroxine 75mcg daily      Stage 3 chronic kidney disease  Creatine stable for now. BMP reviewed- noted Estimated Creatinine Clearance: 55.4 mL/min (based on SCr of 1 mg/dL). according to latest data. Based on current GFR, CKD stage is stage 3 - GFR 30-59.  Monitor UOP and serial BMP and adjust therapy as needed. Renally dose meds. Avoid nephrotoxic medications and procedures.    Kidney stones  Start IV fluid hydration 75cc of normal saline/hr        VTE Risk Mitigation (From admission, onward)           Ordered     heparin (porcine) injection 5,000 Units  Every 8 hours         05/10/24 0116     IP VTE HIGH RISK PATIENT  Once         05/10/24 0116     Place sequential compression device  Until discontinued         05/10/24 0116                    Discharge Planning   LILA: 5/11/2024     Code Status: Full Code   Is the patient medically ready for discharge?:     Reason for patient still in hospital (select all that apply): Patient trending condition and Treatment  Discharge Plan A: Home with family                  Briseida Kirkland NP  Department of Hospital Medicine   [facility]

## 2024-05-12 LAB
ALBUMIN SERPL BCP-MCNC: 4 G/DL (ref 3.5–5.2)
ALP SERPL-CCNC: 40 U/L (ref 55–135)
ALT SERPL W/O P-5'-P-CCNC: 17 U/L (ref 10–44)
ANION GAP SERPL CALC-SCNC: 8 MMOL/L (ref 8–16)
AST SERPL-CCNC: 16 U/L (ref 10–40)
BASOPHILS # BLD AUTO: 0.02 K/UL (ref 0–0.2)
BASOPHILS NFR BLD: 0.3 % (ref 0–1.9)
BILIRUB SERPL-MCNC: 0.8 MG/DL (ref 0.1–1)
BUN SERPL-MCNC: 23 MG/DL (ref 8–23)
CALCIUM SERPL-MCNC: 9.1 MG/DL (ref 8.7–10.5)
CHLORIDE SERPL-SCNC: 104 MMOL/L (ref 95–110)
CO2 SERPL-SCNC: 27 MMOL/L (ref 23–29)
CREAT SERPL-MCNC: 1.1 MG/DL (ref 0.5–1.4)
DIFFERENTIAL METHOD BLD: ABNORMAL
EOSINOPHIL # BLD AUTO: 0.2 K/UL (ref 0–0.5)
EOSINOPHIL NFR BLD: 2.6 % (ref 0–8)
ERYTHROCYTE [DISTWIDTH] IN BLOOD BY AUTOMATED COUNT: 13.9 % (ref 11.5–14.5)
EST. GFR  (NO RACE VARIABLE): 53.4 ML/MIN/1.73 M^2
GLUCOSE SERPL-MCNC: 115 MG/DL (ref 70–110)
HCT VFR BLD AUTO: 33.5 % (ref 37–48.5)
HGB BLD-MCNC: 11.2 G/DL (ref 12–16)
IMM GRANULOCYTES # BLD AUTO: 0.03 K/UL (ref 0–0.04)
IMM GRANULOCYTES NFR BLD AUTO: 0.4 % (ref 0–0.5)
LYMPHOCYTES # BLD AUTO: 2.4 K/UL (ref 1–4.8)
LYMPHOCYTES NFR BLD: 30.6 % (ref 18–48)
MCH RBC QN AUTO: 30.7 PG (ref 27–31)
MCHC RBC AUTO-ENTMCNC: 33.4 G/DL (ref 32–36)
MCV RBC AUTO: 92 FL (ref 82–98)
MONOCYTES # BLD AUTO: 0.8 K/UL (ref 0.3–1)
MONOCYTES NFR BLD: 9.9 % (ref 4–15)
NEUTROPHILS # BLD AUTO: 4.5 K/UL (ref 1.8–7.7)
NEUTROPHILS NFR BLD: 56.2 % (ref 38–73)
NRBC BLD-RTO: 0 /100 WBC
PLATELET # BLD AUTO: 186 K/UL (ref 150–450)
PMV BLD AUTO: 10.2 FL (ref 9.2–12.9)
POTASSIUM SERPL-SCNC: 4 MMOL/L (ref 3.5–5.1)
PROT SERPL-MCNC: 6.8 G/DL (ref 6–8.4)
RBC # BLD AUTO: 3.65 M/UL (ref 4–5.4)
SODIUM SERPL-SCNC: 139 MMOL/L (ref 136–145)
WBC # BLD AUTO: 7.95 K/UL (ref 3.9–12.7)

## 2024-05-12 PROCEDURE — 25000003 PHARM REV CODE 250: Performed by: HOSPITALIST

## 2024-05-12 PROCEDURE — 25000003 PHARM REV CODE 250: Performed by: NURSE PRACTITIONER

## 2024-05-12 PROCEDURE — 27100171 HC OXYGEN HIGH FLOW UP TO 24 HOURS

## 2024-05-12 PROCEDURE — 99900035 HC TECH TIME PER 15 MIN (STAT)

## 2024-05-12 PROCEDURE — 99900031 HC PATIENT EDUCATION (STAT)

## 2024-05-12 PROCEDURE — 94760 N-INVAS EAR/PLS OXIMETRY 1: CPT

## 2024-05-12 PROCEDURE — 21400001 HC TELEMETRY ROOM

## 2024-05-12 PROCEDURE — 36415 COLL VENOUS BLD VENIPUNCTURE: CPT | Performed by: INTERNAL MEDICINE

## 2024-05-12 PROCEDURE — 63600175 PHARM REV CODE 636 W HCPCS: Performed by: INTERNAL MEDICINE

## 2024-05-12 PROCEDURE — 80053 COMPREHEN METABOLIC PANEL: CPT | Performed by: INTERNAL MEDICINE

## 2024-05-12 PROCEDURE — 85025 COMPLETE CBC W/AUTO DIFF WBC: CPT | Performed by: INTERNAL MEDICINE

## 2024-05-12 PROCEDURE — 25000003 PHARM REV CODE 250: Performed by: INTERNAL MEDICINE

## 2024-05-12 RX ORDER — SPIRONOLACTONE 25 MG/1
25 TABLET ORAL DAILY
Status: DISCONTINUED | OUTPATIENT
Start: 2024-05-12 | End: 2024-05-13

## 2024-05-12 RX ORDER — CALCIUM CARBONATE 200(500)MG
1000 TABLET,CHEWABLE ORAL 3 TIMES DAILY PRN
Status: DISCONTINUED | OUTPATIENT
Start: 2024-05-12 | End: 2024-05-14 | Stop reason: HOSPADM

## 2024-05-12 RX ORDER — ALUMINUM HYDROXIDE, MAGNESIUM HYDROXIDE, AND SIMETHICONE 1200; 120; 1200 MG/30ML; MG/30ML; MG/30ML
30 SUSPENSION ORAL
Status: DISCONTINUED | OUTPATIENT
Start: 2024-05-12 | End: 2024-05-14 | Stop reason: HOSPADM

## 2024-05-12 RX ADMIN — CALCIUM CARBONATE 1000 MG: 500 TABLET, CHEWABLE ORAL at 02:05

## 2024-05-12 RX ADMIN — Medication 6 MG: at 09:05

## 2024-05-12 RX ADMIN — SENNOSIDES AND DOCUSATE SODIUM 1 TABLET: 50; 8.6 TABLET ORAL at 09:05

## 2024-05-12 RX ADMIN — ATORVASTATIN CALCIUM 40 MG: 40 TABLET, FILM COATED ORAL at 08:05

## 2024-05-12 RX ADMIN — ALUMINUM HYDROXIDE, MAGNESIUM HYDROXIDE, AND SIMETHICONE 30 ML: 200; 200; 20 SUSPENSION ORAL at 03:05

## 2024-05-12 RX ADMIN — HYDROCODONE BITARTRATE AND ACETAMINOPHEN 1 TABLET: 5; 325 TABLET ORAL at 09:05

## 2024-05-12 RX ADMIN — DULOXETINE HYDROCHLORIDE 60 MG: 30 CAPSULE, DELAYED RELEASE ORAL at 09:05

## 2024-05-12 RX ADMIN — LISINOPRIL 40 MG: 20 TABLET ORAL at 08:05

## 2024-05-12 RX ADMIN — SPIRONOLACTONE 25 MG: 25 TABLET ORAL at 11:05

## 2024-05-12 RX ADMIN — AMLODIPINE BESYLATE 5 MG: 5 TABLET ORAL at 09:05

## 2024-05-12 RX ADMIN — LEVOTHYROXINE SODIUM 75 MCG: 0.03 TABLET ORAL at 05:05

## 2024-05-12 RX ADMIN — HEPARIN SODIUM 5000 UNITS: 5000 INJECTION INTRAVENOUS; SUBCUTANEOUS at 05:05

## 2024-05-12 RX ADMIN — AMLODIPINE BESYLATE 5 MG: 5 TABLET ORAL at 08:05

## 2024-05-12 RX ADMIN — RANOLAZINE 500 MG: 500 TABLET, FILM COATED, EXTENDED RELEASE ORAL at 08:05

## 2024-05-12 RX ADMIN — RANOLAZINE 500 MG: 500 TABLET, FILM COATED, EXTENDED RELEASE ORAL at 09:05

## 2024-05-12 RX ADMIN — HYDROCHLOROTHIAZIDE 12.5 MG: 12.5 TABLET ORAL at 08:05

## 2024-05-12 RX ADMIN — ALUMINUM HYDROXIDE, MAGNESIUM HYDROXIDE, AND SIMETHICONE 30 ML: 200; 200; 20 SUSPENSION ORAL at 09:05

## 2024-05-12 RX ADMIN — ISOSORBIDE MONONITRATE 30 MG: 30 TABLET, EXTENDED RELEASE ORAL at 08:05

## 2024-05-12 RX ADMIN — HEPARIN SODIUM 5000 UNITS: 5000 INJECTION INTRAVENOUS; SUBCUTANEOUS at 01:05

## 2024-05-12 RX ADMIN — PRAMIPEXOLE DIHYDROCHLORIDE 0.5 MG: 0.5 TABLET ORAL at 09:05

## 2024-05-12 RX ADMIN — SENNOSIDES AND DOCUSATE SODIUM 1 TABLET: 50; 8.6 TABLET ORAL at 08:05

## 2024-05-12 NOTE — CARE UPDATE
05/11/24 1556   Patient Assessment/Suction   Level of Consciousness (AVPU) alert   Respiratory Effort Normal;Unlabored   Expansion/Accessory Muscles/Retractions no use of accessory muscles;no retractions;expansion symmetric   All Lung Fields Breath Sounds Anterior:   JHOAN Breath Sounds clear   RUL Breath Sounds clear   Rhythm/Pattern, Respiratory unlabored;depth regular;pattern regular   Cough Frequency no cough   PRE-TX-O2   Device (Oxygen Therapy) room air   SpO2 99 %   Pulse Oximetry Type Intermittent   $ Pulse Oximetry - Multiple Charge Pulse Oximetry - Multiple   Pulse 66   Resp 17   Education   $ Education 15 min;DME Oxygen  (SATS)   Tobacco Cessation Intervention   Do you use any type of tobacco product? No   $ Smoking Cessation Charges Smoking Cessation - Intermediate (Non-CTTS)   Respiratory Evaluation   $ Care Plan Tech Time 15 min   $ Eval/Re-eval Charges Evaluation   Evaluation For New Orders   Admitting Diagnosis CHEST PAIN   Pulmonary Diagnosis SLEEP APNEA   Home Oxygen   Has Home Oxygen? No   Home Aerosol, MDI, DPI, and Other Treatments/Therapies   Home Respiratory Therapy Per Patient/Review of Chart No   Oxygen Care Plan   Oxygen Care Plan Per Protocol   SPO2 Goal (%) 92% non-cardiac   Rationale SpO2 is <92% (Non-cardiac Pt.);Chest pain   Other Oxygen CPAP  (DUE TO SLEEP APNEA)   Bronchodilator Care Plan   Rationale No Rationale found   Atelectasis Care Plan   Rationale No Rational Found   Airway Clearance Care Plan   Rationale Other

## 2024-05-12 NOTE — PROGRESS NOTES
[facility] Medicine  Progress Note    Patient Name: Darshana Alicea  MRN: [id number]  Patient Class: IP- Inpatient   Admission Date: 5/9/2024  Length of Stay: 2 days  Attending Physician: Isidro Galeana MD  Primary Care Provider: Mitch Marquez MD        Subjective:     Principal Problem:Chest pain        HPI:  Ms. Alicea  is a 73 y.o. female with PMH significant for PMH HTN, LORENZO, HLD, angina, hypothyroidism, kidney stones who is being evaluated by the Hospital Medicine service after developing intermittent substernal chest pressure rated 2/10 in severity radiating to her left shoulder and arm since the previous night. Pt denied orthopnea or lower extremity swelling. She admits to some shortness of breath on exertion.  She also complained of right lower flank pain due to her history of kidney stones. She denies any urinary frequency, urgency, burning or retention. Patient admits to headache but denies visual changes. She had been prescribed Nitroglycerin for angina but did not take it because she feels it would worsen her headache. She has elevated BP but takes amlodipine/benazepril 5/40mg in the morning and her PCP suggested she take Amlodiipine 5mg at night as well. She had a stress test in October of 2023 with EF of 74%.  No wall motion abnormalities noted on rest or stress images.  Stress test negative for ischemia by EKG criteria.  Slight decreased uptake noted in the inferior apical wall both rest. She denies any focal weakness, numbness tingling or paresthesias.  Denies any abdominal pain.  Denies melena hematochezia or any GI bleeding.  Denies diarrhea or constipation.  Patient denies headache or visual changes.  Denies any cough or cold symptoms.  Denies dysuria, frequency or urgency.  Denies any other problems or complaints.    Pt denies history of afib, denies personal hx of blood clots. She lives with her  and performs all ADLs independently. Pt denies alcohol and denies  recreational drug use, denies smoking.      Overview/Hospital Course:  No notes on file    Interval History:  Patient seen and examined.  No acute events overnight.  Continues to have intermittent chest pain.  Is currently chest pain-free during my examination.  Added Aldactone today per Cardiology recommendations.  Plan of care discussed with patient and answered all questions.    Review of Systems   Constitutional:  Positive for activity change.   Respiratory:  Positive for shortness of breath.    Cardiovascular:  Positive for chest pain (radiation to left jaw).   Musculoskeletal:  Positive for myalgias.   Neurological:  Positive for headaches.     Objective:     Vital Signs (Most Recent):  Temp: 97.9 °F (36.6 °C) (05/12/24 0727)  Pulse: 65 (05/12/24 0838)  Resp: 17 (05/12/24 0838)  BP: (!) 162/72 (05/12/24 0820)  SpO2: 96 % (05/12/24 0838) Vital Signs (24h Range):  Temp:  [97.6 °F (36.4 °C)-98.3 °F (36.8 °C)] 97.9 °F (36.6 °C)  Pulse:  [61-74] 65  Resp:  [16-20] 17  SpO2:  [95 %-99 %] 96 %  BP: (100-162)/(51-72) 162/72     Weight: 88.6 kg (195 lb 6.4 oz)  Body mass index is 33.02 kg/m².    Intake/Output Summary (Last 24 hours) at 5/12/2024 1057  Last data filed at 5/12/2024 0800  Gross per 24 hour   Intake 360 ml   Output --   Net 360 ml         Physical Exam  Constitutional:       General: She is not in acute distress.     Appearance: She is not ill-appearing, toxic-appearing or diaphoretic.   Cardiovascular:      Rate and Rhythm: Normal rate and regular rhythm.      Pulses: Normal pulses.      Heart sounds: Normal heart sounds.   Pulmonary:      Effort: Pulmonary effort is normal. No respiratory distress.      Breath sounds: Normal breath sounds.   Abdominal:      General: Bowel sounds are normal. There is no distension.      Palpations: Abdomen is soft.      Tenderness: There is no abdominal tenderness.   Skin:     General: Skin is warm and dry.      Capillary Refill: Capillary refill takes less than 2  seconds.      Coloration: Skin is not jaundiced or pale.   Neurological:      Mental Status: She is alert and oriented to person, place, and time. Mental status is at baseline.             Significant Labs: All pertinent labs within the past 24 hours have been reviewed.  CBC:   Recent Labs   Lab 05/11/24  0506 05/12/24  0441   WBC 8.17 7.95   HGB 10.3* 11.2*   HCT 31.0* 33.5*    186     CMP:   Recent Labs   Lab 05/11/24  0506 05/12/24  0441    139   K 3.9 4.0    104   CO2 27 27    115*   BUN 20 23   CREATININE 1.0 1.1   CALCIUM 8.9 9.1   PROT 6.2 6.8   ALBUMIN 3.8 4.0   BILITOT 0.9 0.8   ALKPHOS 36* 40*   AST 16 16   ALT 18 17   ANIONGAP 4* 8       Significant Imaging: I have reviewed all pertinent imaging results/findings within the past 24 hours.    Assessment/Plan:      * Chest pain  Chest pain R/O ACS  Unstable angina  --Start Ranolazine 500mg BID  --Pain control PRN  --2D echo.  --EKG.  --Cardiac enzymes.  --TSH.   --Cardiology consult - appreciate recommendations   --continue amlodipine, HCTZ, lisinopril, Ranexa, and Imdur  --await recommendations regarding angiogram      Hypertensive urgency  Patient has a current diagnosis of hypertensive urgency (without evidence of end organ damage) which is controlled.  Latest blood pressure and vitals reviewed-   Temp:  [97.6 °F (36.4 °C)-98.3 °F (36.8 °C)]   Pulse:  [62-74]   Resp:  [17-20]   BP: (100-162)/(54-72)   SpO2:  [95 %-99 %] .   Patient currently off IV antihypertensives.   Home meds for hypertension were reviewed and noted below.   Hypertension Medications               amLODIPine-benazepriL (LOTREL) 5-40 mg per capsule Take 1 capsule by mouth once daily.    hydroCHLOROthiazide (MICROZIDE) 12.5 mg capsule Take 12.5 mg by mouth once daily.    nitroGLYCERIN (NITROSTAT) 0.4 MG SL tablet Place 0.4 mg under the tongue every 5 (five) minutes as needed for Chest pain.            Medication adjustment for hospital antihypertensives is as  follows- Resume home meds-Ranexa, lisinopril, Imdur and spironolactone added    Will aim for controlled BP reduction by medications noted above. Monitor and mitigate end organ damage as indicated.    Chronic right flank pain  Patient states she has a kidney stone that she is waiting to pass  Start IV hydration      Hypothyroidism  Continue Levothyroxine 75mcg daily      Stage 3 chronic kidney disease  Creatine stable for now. BMP reviewed- noted Estimated Creatinine Clearance: 55.4 mL/min (based on SCr of 1 mg/dL). according to latest data. Based on current GFR, CKD stage is stage 3 - GFR 30-59.  Monitor UOP and serial BMP and adjust therapy as needed. Renally dose meds. Avoid nephrotoxic medications and procedures.    Kidney stones  Start IV fluid hydration 75cc of normal saline/hr        VTE Risk Mitigation (From admission, onward)           Ordered     heparin (porcine) injection 5,000 Units  Every 8 hours         05/10/24 0116     IP VTE HIGH RISK PATIENT  Once         05/10/24 0116     Place sequential compression device  Until discontinued         05/10/24 0116                    Discharge Planning   LILA: 5/11/2024     Code Status: Full Code   Is the patient medically ready for discharge?:     Reason for patient still in hospital (select all that apply): Patient trending condition, Treatment, Imaging, and Consult recommendations  Discharge Plan A: Home with family                  Briseida Kirkland NP  Department of Hospital Medicine   [facility]

## 2024-05-12 NOTE — CARE UPDATE
05/12/24 0838   Patient Assessment/Suction   Level of Consciousness (AVPU) alert   Respiratory Effort Normal;Unlabored   Expansion/Accessory Muscles/Retractions no use of accessory muscles;no retractions;expansion symmetric   All Lung Fields Breath Sounds Anterior:   JHOAN Breath Sounds clear   RUL Breath Sounds clear   Rhythm/Pattern, Respiratory unlabored;depth regular   Cough Frequency no cough   Skin Integrity   $ Wound Care Tech Time 15 min   Area Observed Left;Right;Bridge of nose   Skin Appearance without discoloration   PRE-TX-O2   Device (Oxygen Therapy) room air   $ Is the patient on High Flow Oxygen? Yes   SpO2 96 %   Pulse Oximetry Type Intermittent   $ Pulse Oximetry - Single Charge Pulse Oximetry - Single   Pulse 65   Resp 17   Preset CPAP/BiPAP Settings   Mode Of Delivery CPAP;Standby   Equipment Type V60   Education   $ Education 15 min;Other (see comment)  (SATS)

## 2024-05-12 NOTE — SUBJECTIVE & OBJECTIVE
Interval History:  Patient seen and examined.  No acute events overnight.  Continues to have intermittent chest pain.  Is currently chest pain-free during my examination.  Added Aldactone today per Cardiology recommendations.  Plan of care discussed with patient and answered all questions.    Review of Systems   Constitutional:  Positive for activity change.   Respiratory:  Positive for shortness of breath.    Cardiovascular:  Positive for chest pain (radiation to left jaw).   Musculoskeletal:  Positive for myalgias.   Neurological:  Positive for headaches.     Objective:     Vital Signs (Most Recent):  Temp: 97.9 °F (36.6 °C) (05/12/24 0727)  Pulse: 65 (05/12/24 0838)  Resp: 17 (05/12/24 0838)  BP: (!) 162/72 (05/12/24 0820)  SpO2: 96 % (05/12/24 0838) Vital Signs (24h Range):  Temp:  [97.6 °F (36.4 °C)-98.3 °F (36.8 °C)] 97.9 °F (36.6 °C)  Pulse:  [61-74] 65  Resp:  [16-20] 17  SpO2:  [95 %-99 %] 96 %  BP: (100-162)/(51-72) 162/72     Weight: 88.6 kg (195 lb 6.4 oz)  Body mass index is 33.02 kg/m².    Intake/Output Summary (Last 24 hours) at 5/12/2024 1057  Last data filed at 5/12/2024 0800  Gross per 24 hour   Intake 360 ml   Output --   Net 360 ml         Physical Exam  Constitutional:       General: She is not in acute distress.     Appearance: She is not ill-appearing, toxic-appearing or diaphoretic.   Cardiovascular:      Rate and Rhythm: Normal rate and regular rhythm.      Pulses: Normal pulses.      Heart sounds: Normal heart sounds.   Pulmonary:      Effort: Pulmonary effort is normal. No respiratory distress.      Breath sounds: Normal breath sounds.   Abdominal:      General: Bowel sounds are normal. There is no distension.      Palpations: Abdomen is soft.      Tenderness: There is no abdominal tenderness.   Skin:     General: Skin is warm and dry.      Capillary Refill: Capillary refill takes less than 2 seconds.      Coloration: Skin is not jaundiced or pale.   Neurological:      Mental Status: She  is alert and oriented to person, place, and time. Mental status is at baseline.             Significant Labs: All pertinent labs within the past 24 hours have been reviewed.  CBC:   Recent Labs   Lab 05/11/24  0506 05/12/24  0441   WBC 8.17 7.95   HGB 10.3* 11.2*   HCT 31.0* 33.5*    186     CMP:   Recent Labs   Lab 05/11/24  0506 05/12/24  0441    139   K 3.9 4.0    104   CO2 27 27    115*   BUN 20 23   CREATININE 1.0 1.1   CALCIUM 8.9 9.1   PROT 6.2 6.8   ALBUMIN 3.8 4.0   BILITOT 0.9 0.8   ALKPHOS 36* 40*   AST 16 16   ALT 18 17   ANIONGAP 4* 8       Significant Imaging: I have reviewed all pertinent imaging results/findings within the past 24 hours.

## 2024-05-13 LAB
ALBUMIN SERPL BCP-MCNC: 4.7 G/DL (ref 3.5–5.2)
ALP SERPL-CCNC: 42 U/L (ref 55–135)
ALT SERPL W/O P-5'-P-CCNC: 29 U/L (ref 10–44)
ANION GAP SERPL CALC-SCNC: 9 MMOL/L (ref 8–16)
AST SERPL-CCNC: 27 U/L (ref 10–40)
BASOPHILS # BLD AUTO: 0.02 K/UL (ref 0–0.2)
BASOPHILS NFR BLD: 0.2 % (ref 0–1.9)
BILIRUB SERPL-MCNC: 0.7 MG/DL (ref 0.1–1)
BUN SERPL-MCNC: 25 MG/DL (ref 8–23)
CALCIUM SERPL-MCNC: 10.2 MG/DL (ref 8.7–10.5)
CHLORIDE SERPL-SCNC: 98 MMOL/L (ref 95–110)
CO2 SERPL-SCNC: 30 MMOL/L (ref 23–29)
CREAT SERPL-MCNC: 1.4 MG/DL (ref 0.5–1.4)
DIFFERENTIAL METHOD BLD: NORMAL
EOSINOPHIL # BLD AUTO: 0.2 K/UL (ref 0–0.5)
EOSINOPHIL NFR BLD: 2.3 % (ref 0–8)
ERYTHROCYTE [DISTWIDTH] IN BLOOD BY AUTOMATED COUNT: 14.1 % (ref 11.5–14.5)
EST. GFR  (NO RACE VARIABLE): 40 ML/MIN/1.73 M^2
GLUCOSE SERPL-MCNC: 130 MG/DL (ref 70–110)
HCT VFR BLD AUTO: 38.1 % (ref 37–48.5)
HGB BLD-MCNC: 12.6 G/DL (ref 12–16)
IMM GRANULOCYTES # BLD AUTO: 0.03 K/UL (ref 0–0.04)
IMM GRANULOCYTES NFR BLD AUTO: 0.3 % (ref 0–0.5)
LYMPHOCYTES # BLD AUTO: 2.9 K/UL (ref 1–4.8)
LYMPHOCYTES NFR BLD: 34.1 % (ref 18–48)
MCH RBC QN AUTO: 30.8 PG (ref 27–31)
MCHC RBC AUTO-ENTMCNC: 33.1 G/DL (ref 32–36)
MCV RBC AUTO: 93 FL (ref 82–98)
MONOCYTES # BLD AUTO: 0.7 K/UL (ref 0.3–1)
MONOCYTES NFR BLD: 7.9 % (ref 4–15)
NEUTROPHILS # BLD AUTO: 4.8 K/UL (ref 1.8–7.7)
NEUTROPHILS NFR BLD: 55.2 % (ref 38–73)
NRBC BLD-RTO: 0 /100 WBC
PLATELET # BLD AUTO: 249 K/UL (ref 150–450)
PMV BLD AUTO: 10 FL (ref 9.2–12.9)
POTASSIUM SERPL-SCNC: 4.4 MMOL/L (ref 3.5–5.1)
PROT SERPL-MCNC: 7.9 G/DL (ref 6–8.4)
RBC # BLD AUTO: 4.09 M/UL (ref 4–5.4)
SODIUM SERPL-SCNC: 137 MMOL/L (ref 136–145)
WBC # BLD AUTO: 8.63 K/UL (ref 3.9–12.7)

## 2024-05-13 PROCEDURE — 21400001 HC TELEMETRY ROOM

## 2024-05-13 PROCEDURE — 36415 COLL VENOUS BLD VENIPUNCTURE: CPT | Performed by: INTERNAL MEDICINE

## 2024-05-13 PROCEDURE — 80053 COMPREHEN METABOLIC PANEL: CPT | Performed by: INTERNAL MEDICINE

## 2024-05-13 PROCEDURE — 85025 COMPLETE CBC W/AUTO DIFF WBC: CPT | Performed by: INTERNAL MEDICINE

## 2024-05-13 PROCEDURE — 25000003 PHARM REV CODE 250: Performed by: INTERNAL MEDICINE

## 2024-05-13 PROCEDURE — 25000003 PHARM REV CODE 250: Performed by: NURSE PRACTITIONER

## 2024-05-13 PROCEDURE — 99900035 HC TECH TIME PER 15 MIN (STAT)

## 2024-05-13 PROCEDURE — 94761 N-INVAS EAR/PLS OXIMETRY MLT: CPT

## 2024-05-13 PROCEDURE — 25000003 PHARM REV CODE 250: Performed by: HOSPITALIST

## 2024-05-13 PROCEDURE — 25000003 PHARM REV CODE 250

## 2024-05-13 PROCEDURE — 99233 SBSQ HOSP IP/OBS HIGH 50: CPT | Mod: ,,, | Performed by: INTERNAL MEDICINE

## 2024-05-13 PROCEDURE — 63600175 PHARM REV CODE 636 W HCPCS: Performed by: INTERNAL MEDICINE

## 2024-05-13 PROCEDURE — 94660 CPAP INITIATION&MGMT: CPT

## 2024-05-13 PROCEDURE — 99900031 HC PATIENT EDUCATION (STAT)

## 2024-05-13 PROCEDURE — 94760 N-INVAS EAR/PLS OXIMETRY 1: CPT

## 2024-05-13 RX ORDER — SODIUM CHLORIDE 9 MG/ML
INJECTION, SOLUTION INTRAVENOUS CONTINUOUS
Status: DISCONTINUED | OUTPATIENT
Start: 2024-05-13 | End: 2024-05-14

## 2024-05-13 RX ORDER — LORAZEPAM 1 MG/1
1 TABLET ORAL EVERY 12 HOURS PRN
Status: DISCONTINUED | OUTPATIENT
Start: 2024-05-13 | End: 2024-05-14 | Stop reason: HOSPADM

## 2024-05-13 RX ADMIN — DULOXETINE HYDROCHLORIDE 60 MG: 30 CAPSULE, DELAYED RELEASE ORAL at 08:05

## 2024-05-13 RX ADMIN — ONDANSETRON 4 MG: 2 INJECTION INTRAMUSCULAR; INTRAVENOUS at 06:05

## 2024-05-13 RX ADMIN — LORAZEPAM 1 MG: 1 TABLET ORAL at 03:05

## 2024-05-13 RX ADMIN — PRAMIPEXOLE DIHYDROCHLORIDE 0.5 MG: 0.5 TABLET ORAL at 08:05

## 2024-05-13 RX ADMIN — SODIUM CHLORIDE: 9 INJECTION, SOLUTION INTRAVENOUS at 01:05

## 2024-05-13 RX ADMIN — ALUMINUM HYDROXIDE, MAGNESIUM HYDROXIDE, AND SIMETHICONE 30 ML: 200; 200; 20 SUSPENSION ORAL at 04:05

## 2024-05-13 RX ADMIN — RANOLAZINE 500 MG: 500 TABLET, FILM COATED, EXTENDED RELEASE ORAL at 08:05

## 2024-05-13 RX ADMIN — AMLODIPINE BESYLATE 5 MG: 5 TABLET ORAL at 08:05

## 2024-05-13 RX ADMIN — SENNOSIDES AND DOCUSATE SODIUM 1 TABLET: 50; 8.6 TABLET ORAL at 08:05

## 2024-05-13 RX ADMIN — POLYETHYLENE GLYCOL 3350 17 G: 17 POWDER, FOR SOLUTION ORAL at 04:05

## 2024-05-13 NOTE — CARE UPDATE
05/13/24 0900   Patient Assessment/Suction   Level of Consciousness (AVPU) alert   Respiratory Effort Unlabored   Expansion/Accessory Muscles/Retractions no use of accessory muscles   All Lung Fields Breath Sounds Anterior:;Lateral:;clear   Rhythm/Pattern, Respiratory assisted mechanically   Cough Frequency infrequent   Cough Type good;nonproductive   PRE-TX-O2   Device (Oxygen Therapy) room air   SpO2 (!) 94 %   Pulse Oximetry Type Intermittent   $ Pulse Oximetry - Multiple Charge Pulse Oximetry - Multiple   Preset CPAP/BiPAP Settings   Mode Of Delivery CPAP;Standby   $ CPAP/BiPAP Daily Charge BiPAP/CPAP Daily   Education   $ Education DME BiPAP;15 min

## 2024-05-13 NOTE — CARE UPDATE
05/12/24 2211   Patient Assessment/Suction   Level of Consciousness (AVPU) alert   Respiratory Effort Unlabored   Expansion/Accessory Muscles/Retractions expansion symmetric   All Lung Fields Breath Sounds clear   Rhythm/Pattern, Respiratory assisted mechanically   Cough Frequency infrequent   Cough Type good;nonproductive   Skin Integrity   $ Wound Care Tech Time 15 min   Area Observed Bridge of nose   Skin Appearance redness blanchable   PRE-TX-O2   Device (Oxygen Therapy) CPAP   $ Is the patient on Low Flow Oxygen? Yes   Oxygen Concentration (%) 21   SpO2 96 %   Pulse Oximetry Type Intermittent   $ Pulse Oximetry - Single Charge Pulse Oximetry - Single   Pulse 65   Resp 18   Ready to Wean/Extubation Screen   FIO2<=50 (chart decimal) 0.21   Preset CPAP/BiPAP Settings   Mode Of Delivery CPAP   $ Is patient using? Yes   Size of Mask Small   Sized Appropriately? Yes   Equipment Type V60   Airway Device Type small full face mask   CPAP (cm H2O) 5   Patient CPAP/BiPAP Settings   RR Total (Breaths/Min) 18   Tidal Volume (mL) 400   VE Minute Ventilation (L/min) 8 L/min   Peak Inspiratory Pressure (cm H2O) 8   TiTOT (%) 26   Total Leak (L/Min) 5   Patient Trigger - ST Mode Only (%) 99   CPAP/BiPAP Alarms   High Pressure (cm H2O) 40   Low Pressure (cm H2O) 3   High RR (breaths/min) 30   Low RR (breaths/min) 10   Apnea (Sec) 20   Education   $ Education DME Oxygen;15 min

## 2024-05-13 NOTE — SUBJECTIVE & OBJECTIVE
Interval History:  Patient seen and examined.  No acute events overnight.  Continues to have intermittent chest pain.  Is currently chest pain-free during my examination.  Patient is scheduled for heart catheterization today has been deferred until tomorrow.    Review of Systems   Constitutional:  Positive for activity change.   Respiratory:  Positive for shortness of breath.    Cardiovascular:  Positive for chest pain (radiation to left jaw).   Musculoskeletal:  Positive for myalgias.   Neurological:  Positive for headaches.     Objective:     Vital Signs (Most Recent):  Temp: 98.3 °F (36.8 °C) (05/13/24 0701)  Pulse: 68 (05/13/24 0701)  Resp: 16 (05/13/24 0701)  BP: (!) 110/56 (05/13/24 0701)  SpO2: (!) 94 % (05/13/24 0900) Vital Signs (24h Range):  Temp:  [97.5 °F (36.4 °C)-98.3 °F (36.8 °C)] 98.3 °F (36.8 °C)  Pulse:  [63-76] 68  Resp:  [16-18] 16  SpO2:  [94 %-98 %] 94 %  BP: (110-143)/(55-65) 110/56     Weight: 88.6 kg (195 lb 6.4 oz)  Body mass index is 33.02 kg/m².    Intake/Output Summary (Last 24 hours) at 5/13/2024 1146  Last data filed at 5/13/2024 0819  Gross per 24 hour   Intake 560 ml   Output --   Net 560 ml         Physical Exam  Constitutional:       General: She is not in acute distress.     Appearance: She is not ill-appearing, toxic-appearing or diaphoretic.   Cardiovascular:      Rate and Rhythm: Normal rate and regular rhythm.      Pulses: Normal pulses.      Heart sounds: Normal heart sounds.   Pulmonary:      Effort: Pulmonary effort is normal. No respiratory distress.      Breath sounds: Normal breath sounds.   Abdominal:      General: Bowel sounds are normal. There is no distension.      Palpations: Abdomen is soft.      Tenderness: There is no abdominal tenderness.   Skin:     General: Skin is warm and dry.      Capillary Refill: Capillary refill takes less than 2 seconds.      Coloration: Skin is not jaundiced or pale.   Neurological:      Mental Status: She is alert and oriented to  person, place, and time. Mental status is at baseline.             Significant Labs: All pertinent labs within the past 24 hours have been reviewed.  CBC:   Recent Labs   Lab 05/12/24  0441 05/13/24  0456   WBC 7.95 8.63   HGB 11.2* 12.6   HCT 33.5* 38.1    249     CMP:   Recent Labs   Lab 05/12/24  0441 05/13/24  0456    137   K 4.0 4.4    98   CO2 27 30*   * 130*   BUN 23 25*   CREATININE 1.1 1.4   CALCIUM 9.1 10.2   PROT 6.8 7.9   ALBUMIN 4.0 4.7   BILITOT 0.8 0.7   ALKPHOS 40* 42*   AST 16 27   ALT 17 29   ANIONGAP 8 9       Significant Imaging: I have reviewed all pertinent imaging results/findings within the past 24 hours.

## 2024-05-13 NOTE — PLAN OF CARE
[facility] re-scheduled until tomorrow, pending kidney function, per cardiology    CM following     05/13/24 1403   Discharge Reassessment   Assessment Type Discharge Planning Reassessment   Did the patient's condition or plan change since previous assessment? Yes   Post-Acute Status   Discharge Delays (!) Procedure Scheduling (IR, OR, Labs, Echo, Cath, Echo, EEG)

## 2024-05-13 NOTE — PROGRESS NOTES
[facility]  Department of Cardiology  Consult Note      PATIENT NAME: Darshana Alicea    MRN: [id number]  TODAY'S DATE: 05/13/2024  ADMIT DATE: 5/9/2024                          CONSULT REQUESTED BY: Isidro Galeana MD    SUBJECTIVE     PRINCIPAL PROBLEM: Chest pain    INTERVAL HISTORY  05/13/2024  Patient seen today resting comfortably in bed.  Discussed pushing left heart catheterization back to tomorrow due to her rising creatinine level.  Patient was agreeable.  Questions answered and support provided.    5/11/24  BP improved  Episodes of CP intermittently  Episode of dyspnea lasting 5 min yesterday  Had angiogram 2007 that was reportedly OK    HPI:    Patient is a 73-year-old female who presented to the emergency room with complaints of substernal chest discomfort radiating to her left shoulder.  She also had some shortness of breath with exertion.  Patient noted to have reproducible chest pain on evaluation.  She has had a negative stress test last year and follows with Cardiology outpatient.  Patient had an angiogram in 2007 which showed small vessels but patent coronaries at that time.       REASON FOR CONSULT:  From Hospitalist H&P: Ms. Alicea  is a 73 y.o. female with PMH significant for PMH HTN, LORENZO, HLD, angina, hypothyroidism, kidney stones who is being evaluated by the Hospital Medicine service after developing intermittent substernal chest pressure rated 2/10 in severity radiating to her left shoulder and arm since the previous night. Pt denied orthopnea or lower extremity swelling. She admits to some shortness of breath on exertion.  She also complained of right lower flank pain due to her history of kidney stones. She denies any urinary frequency, urgency, burning or retention. Patient admits to headache but denies visual changes. She had been prescribed Nitroglycerin for angina but did not take it because she feels it would worsen her headache. She has elevated BP but takes  amlodipine/benazepril 5/40mg in the morning and her PCP suggested she take Amlodiipine 5mg at night as well. She had a stress test in 2023 with EF of 74%.  No wall motion abnormalities noted on rest or stress images.  Stress test negative for ischemia by EKG criteria.  Slight decreased uptake noted in the inferior apical wall both rest. She denies any focal weakness, numbness tingling or paresthesias.  Denies any abdominal pain.  Denies melena hematochezia or any GI bleeding.  Denies diarrhea or constipation.  Patient denies headache or visual changes.  Denies any cough or cold symptoms.  Denies dysuria, frequency or urgency.  Denies any other problems or complaints.     Pt denies history of afib, denies personal hx of blood clots. She lives with her  and performs all ADLs independently. Pt denies alcohol and denies recreational drug use, denies smoking.         Review of patient's allergies indicates:   Allergen Reactions    Lyrica [pregabalin] Swelling    Sulfa (sulfonamide antibiotics) Swelling    Toradol [ketorolac] Swelling       Past Medical History:   Diagnosis Date    Anemia     Colon polyp     Hypothyroidism 12/10/2023    Kidney stone     Kidney stones 10/29/2012    Thyroid disease      Past Surgical History:   Procedure Laterality Date    BLADDER SURGERY      CHOLECYSTECTOMY      CYSTOSCOPY      HYSTERECTOMY      OOPHORECTOMY      tonsillectomy   1964     Social History     Tobacco Use    Smoking status: Former     Current packs/day: 0.00     Types: Cigarettes     Start date: 1971     Quit date: 2001     Years since quittin.4   Substance Use Topics    Alcohol use: No    Drug use: No        REVIEW OF SYSTEMS  Per HPI    OBJECTIVE     VITAL SIGNS (Most Recent)  Temp: 98.3 °F (36.8 °C) (24)  Pulse: 68 (24)  Resp: 16 (24)  BP: (!) 110/56 (24)  SpO2: (!) 94 % (24 0900)    VENTILATION STATUS  Resp: 16 (24)  SpO2: (!) 94  % (05/13/24 0900)  Oxygen Concentration (%):  [21] 21        I & O (Last 24H):  Intake/Output Summary (Last 24 hours) at 5/13/2024 1308  Last data filed at 5/13/2024 0819  Gross per 24 hour   Intake 360 ml   Output --   Net 360 ml       WEIGHTS  Wt Readings from Last 1 Encounters:   05/10/24 0257 88.6 kg (195 lb 6.4 oz)   05/09/24 1436 86.2 kg (190 lb)       PHYSICAL EXAM  CONSTITUTIONAL: resting comfortably in bed;   HEENT: Normocephalic, atraumatic,pupils reactive to light                 NECK:  No JVD no carotid bruit  CVS: S1S2+, RRR  LUNGS: Clear  ABDOMEN: Soft, NT, BS+  EXTREMITIES: No cyanosis, edema  : No medina catheter  NEURO: AAO X 3  PSY: Normal affect      HOME MEDICATIONS:  No current facility-administered medications on file prior to encounter.     Current Outpatient Medications on File Prior to Encounter   Medication Sig Dispense Refill    albuterol-ipratropium (DUO-NEB) 2.5 mg-0.5 mg/3 mL nebulizer solution Inhale 3 mLs into the lungs every 6 (six) hours as needed.      amLODIPine (NORVASC) 5 MG tablet Take 5 mg by mouth every evening.      beta-carotene,A,-vits C,E/mins (OCUVITE ORAL) Take 1 tablet by mouth once daily.      ciclopirox (PENLAC) 8 % Soln Apply 1 drop topically nightly.      DULoxetine (CYMBALTA) 60 MG capsule Take 60 mg by mouth once daily.      levothyroxine (SYNTHROID) 75 MCG tablet Take 75 mcg by mouth once daily.      nitroGLYCERIN (NITROSTAT) 0.4 MG SL tablet Place 0.4 mg under the tongue every 5 (five) minutes as needed for Chest pain.      pramipexole (MIRAPEX) 0.5 MG tablet Take 0.5 mg by mouth every evening.      RESTASIS 0.05 % ophthalmic emulsion Apply 1 drop to eye every 12 (twelve) hours.      rosuvastatin (CRESTOR) 20 MG tablet Take 1 tablet by mouth once daily.      ergocalciferol (ERGOCALCIFEROL) 50,000 unit Cap Take 50,000 Units by mouth every 7 days. SATURDAYS      hydroCHLOROthiazide (MICROZIDE) 12.5 mg capsule Take 12.5 mg by mouth once daily. (Patient not  taking: Reported on 5/10/2024)      pantoprazole (PROTONIX) 40 MG tablet Take 40 mg by mouth once daily. (Patient not taking: Reported on 5/10/2024)      solifenacin (VESICARE) 10 MG tablet Take 1 tablet (10 mg total) by mouth once daily. (Patient not taking: Reported on 5/10/2024) 30 tablet 11    traZODone (DESYREL) 50 MG tablet Take 50 mg by mouth every evening. (Patient not taking: Reported on 5/10/2024)         SCHEDULED MEDS:   aluminum-magnesium hydroxide-simethicone  30 mL Oral QID (AC & HS)    amLODIPine  5 mg Oral BID    atorvastatin  40 mg Oral Daily    DULoxetine  60 mg Oral QHS    heparin (porcine)  5,000 Units Subcutaneous Q8H    hydroCHLOROthiazide  12.5 mg Oral Daily    isosorbide mononitrate  30 mg Oral Daily    levothyroxine  75 mcg Oral Before breakfast    lisinopriL  40 mg Oral Daily    polyethylene glycol  17 g Oral Daily    pramipexole  0.5 mg Oral QHS    ranolazine  500 mg Oral BID    senna-docusate 8.6-50 mg  1 tablet Oral BID    spironolactone  25 mg Oral Daily       CONTINUOUS INFUSIONS:        PRN MEDS:  Current Facility-Administered Medications:     acetaminophen, 650 mg, Oral, Q8H PRN    acetaminophen, 650 mg, Oral, Q8H PRN    calcium carbonate, 1,000 mg, Oral, TID PRN    diphenhydrAMINE, 50 mg, Oral, On Call Procedure    HYDROcodone-acetaminophen, 1 tablet, Oral, Q4H PRN    melatonin, 6 mg, Oral, Nightly PRN    ondansetron, 4 mg, Intravenous, Q8H PRN    sodium chloride 0.9%, 10 mL, Intravenous, PRN    sodium chloride 0.9%, 2 mL, Intravenous, PRN    LABS AND DIAGNOSTICS     CBC LAST 3 DAYS  Recent Labs   Lab 05/11/24  0506 05/12/24  0441 05/13/24  0456   WBC 8.17 7.95 8.63   RBC 3.39* 3.65* 4.09   HGB 10.3* 11.2* 12.6   HCT 31.0* 33.5* 38.1   MCV 91 92 93   MCH 30.4 30.7 30.8   MCHC 33.2 33.4 33.1   RDW 14.0 13.9 14.1    186 249   MPV 10.2 10.2 10.0   GRAN 68.3  5.6 56.2  4.5 55.2  4.8   LYMPH 20.7  1.7 30.6  2.4 34.1  2.9   MONO 8.3  0.7 9.9  0.8 7.9  0.7   BASO 0.02 0.02  "0.02   NRBC 0 0 0       COAGULATION LAST 3 DAYS  No results for input(s): "LABPT", "INR", "APTT" in the last 168 hours.    CHEMISTRY LAST 3 DAYS  Recent Labs   Lab 05/11/24  0506 05/12/24  0441 05/13/24  0456    139 137   K 3.9 4.0 4.4    104 98   CO2 27 27 30*   ANIONGAP 4* 8 9   BUN 20 23 25*   CREATININE 1.0 1.1 1.4    115* 130*   CALCIUM 8.9 9.1 10.2   ALBUMIN 3.8 4.0 4.7   PROT 6.2 6.8 7.9   ALKPHOS 36* 40* 42*   ALT 18 17 29   AST 16 16 27   BILITOT 0.9 0.8 0.7       CARDIAC PROFILE LAST 3 DAYS  Recent Labs   Lab 05/09/24  2114 05/10/24  0229 05/10/24  0531   BNP 31  --   --    TROPONINIHS 3.1 4.5 4.1  4.2       ENDOCRINE LAST 3 DAYS  Recent Labs   Lab 05/10/24  0531   TSH 2.476       LAST ARTERIAL BLOOD GAS  ABG  No results for input(s): "PH", "PO2", "PCO2", "HCO3", "BE" in the last 168 hours.    LAST 7 DAYS MICROBIOLOGY   Microbiology Results (last 7 days)       ** No results found for the last 168 hours. **            MOST RECENT IMAGING  Echo    Left Ventricle: The left ventricle is normal in size. Moderately   increased wall thickness. There is moderate concentric hypertrophy. Normal   wall motion. There is normal systolic function with a visually estimated   ejection fraction of 60 - 65%. There is normal diastolic function.    Right Ventricle: Normal right ventricular cavity size. Systolic   function is normal.    Mitral Valve: There is mild regurgitation.    Tricuspid Valve: There is mild regurgitation.    IVC/SVC: Normal venous pressure at 3 mmHg.      ECHOCARDIOGRAM RESULTS (last 5)  No results found for this or any previous visit.      CURRENT/PREVIOUS VISIT EKG  Results for orders placed or performed during the hospital encounter of 05/09/24   EKG 12-lead    Collection Time: 05/10/24 11:54 AM   Result Value Ref Range    QRS Duration 84 ms    OHS QTC Calculation 432 ms    Narrative    Test Reason : R07.9,    Vent. Rate : 069 BPM     Atrial Rate : 069 BPM     P-R Int : 168 ms       "    QRS Dur : 084 ms      QT Int : 404 ms       P-R-T Axes : 078 061 075 degrees     QTc Int : 432 ms    Normal sinus rhythm  Normal ECG  When compared with ECG of 09-MAY-2024 14:30,  No significant change was found    Referred By: PENNY   SELF           Confirmed By:            ASSESSMENT/PLAN:     Active Hospital Problems    Diagnosis    *Chest pain    Chronic right flank pain    Hypertensive urgency    Hypothyroidism    Stage 3 chronic kidney disease    Kidney stones       ASSESSMENT & PLAN:     Chest pain  HERBERTH  HTN urgency  CKD      RECOMMENDATIONS:    Start NS @100 cc/hr.   Redraw BMP in the AM to reassess creatinine.  NPO tonight after midnight.  Advised the patient on the recommendation for angiogram with possible PCI. Discussed with patient the risks and benefits of angiogram with PCI. Risks include but are not limited to the followin:1000 risk of heart attack, stroke, emergency CABG, and death as well as a 3-5% risk of bleeding, vessel damage, and contrast-induce nephropathy.  All questions have been answered to patient's satisfaction, and patient has voiced the desire to proceed with the procedure.   We will plan for left heart catheterization tomorrow with Dr. Ramesh. Orders should already be in.   Will continue to follow at this time.       Enedina Rivera NP  Date of Service: 2024  2:00 PM    I have personally interviewed and examined the patient, I have reviewed the Nurse Practitioner's history and physical, assessment, and plan. I have personally evaluated the patient at bedside and agree with the findings and made appropriate changes as necessary in recommendations.  HERBERTH probably prerenal. IV hydration today. Cath tomorrow if Cr improved.   Hold HCTZ and aldactone.     ELIDA RAMESH M.D.  Department of Cardiology  Date of Service: 2024

## 2024-05-13 NOTE — PROGRESS NOTES
[facility] Medicine  Progress Note    Patient Name: Darshana Alicea  MRN: [id number]  Patient Class: IP- Inpatient   Admission Date: 5/9/2024  Length of Stay: 3 days  Attending Physician: Isidro Galeana MD  Primary Care Provider: Mitch Marquez MD        Subjective:     Principal Problem:Chest pain        HPI:  Ms. Alicea  is a 73 y.o. female with PMH significant for PMH HTN, LORENZO, HLD, angina, hypothyroidism, kidney stones who is being evaluated by the Hospital Medicine service after developing intermittent substernal chest pressure rated 2/10 in severity radiating to her left shoulder and arm since the previous night. Pt denied orthopnea or lower extremity swelling. She admits to some shortness of breath on exertion.  She also complained of right lower flank pain due to her history of kidney stones. She denies any urinary frequency, urgency, burning or retention. Patient admits to headache but denies visual changes. She had been prescribed Nitroglycerin for angina but did not take it because she feels it would worsen her headache. She has elevated BP but takes amlodipine/benazepril 5/40mg in the morning and her PCP suggested she take Amlodiipine 5mg at night as well. She had a stress test in October of 2023 with EF of 74%.  No wall motion abnormalities noted on rest or stress images.  Stress test negative for ischemia by EKG criteria.  Slight decreased uptake noted in the inferior apical wall both rest. She denies any focal weakness, numbness tingling or paresthesias.  Denies any abdominal pain.  Denies melena hematochezia or any GI bleeding.  Denies diarrhea or constipation.  Patient denies headache or visual changes.  Denies any cough or cold symptoms.  Denies dysuria, frequency or urgency.  Denies any other problems or complaints.    Pt denies history of afib, denies personal hx of blood clots. She lives with her  and performs all ADLs independently. Pt denies alcohol and denies  recreational drug use, denies smoking.      Overview/Hospital Course:  Patient was admitted with intermittent chest pain and uncontrolled HTN.  Cardiology was consulted.  She was initiated on Ranexa, Imdur and spironolactone.  Her EKG was nonischemic.  Her troponins were trended and remained negative.  Echocardiogram was obtained which revealed an EF of 60-65% with normal diastolic function.  She continued to have intermittent chest pain during her stay.  Patient was scheduled for left heart catheterization.    Interval History:  Patient seen and examined.  No acute events overnight.  Continues to have intermittent chest pain.  Is currently chest pain-free during my examination.  Patient is scheduled for heart catheterization today has been deferred until tomorrow.    Review of Systems   Constitutional:  Positive for activity change.   Respiratory:  Positive for shortness of breath.    Cardiovascular:  Positive for chest pain (radiation to left jaw).   Musculoskeletal:  Positive for myalgias.   Neurological:  Positive for headaches.     Objective:     Vital Signs (Most Recent):  Temp: 98.3 °F (36.8 °C) (05/13/24 0701)  Pulse: 68 (05/13/24 0701)  Resp: 16 (05/13/24 0701)  BP: (!) 110/56 (05/13/24 0701)  SpO2: (!) 94 % (05/13/24 0900) Vital Signs (24h Range):  Temp:  [97.5 °F (36.4 °C)-98.3 °F (36.8 °C)] 98.3 °F (36.8 °C)  Pulse:  [63-76] 68  Resp:  [16-18] 16  SpO2:  [94 %-98 %] 94 %  BP: (110-143)/(55-65) 110/56     Weight: 88.6 kg (195 lb 6.4 oz)  Body mass index is 33.02 kg/m².    Intake/Output Summary (Last 24 hours) at 5/13/2024 1146  Last data filed at 5/13/2024 0819  Gross per 24 hour   Intake 560 ml   Output --   Net 560 ml         Physical Exam  Constitutional:       General: She is not in acute distress.     Appearance: She is not ill-appearing, toxic-appearing or diaphoretic.   Cardiovascular:      Rate and Rhythm: Normal rate and regular rhythm.      Pulses: Normal pulses.      Heart sounds: Normal heart  sounds.   Pulmonary:      Effort: Pulmonary effort is normal. No respiratory distress.      Breath sounds: Normal breath sounds.   Abdominal:      General: Bowel sounds are normal. There is no distension.      Palpations: Abdomen is soft.      Tenderness: There is no abdominal tenderness.   Skin:     General: Skin is warm and dry.      Capillary Refill: Capillary refill takes less than 2 seconds.      Coloration: Skin is not jaundiced or pale.   Neurological:      Mental Status: She is alert and oriented to person, place, and time. Mental status is at baseline.             Significant Labs: All pertinent labs within the past 24 hours have been reviewed.  CBC:   Recent Labs   Lab 05/12/24 0441 05/13/24 0456   WBC 7.95 8.63   HGB 11.2* 12.6   HCT 33.5* 38.1    249     CMP:   Recent Labs   Lab 05/12/24 0441 05/13/24 0456    137   K 4.0 4.4    98   CO2 27 30*   * 130*   BUN 23 25*   CREATININE 1.1 1.4   CALCIUM 9.1 10.2   PROT 6.8 7.9   ALBUMIN 4.0 4.7   BILITOT 0.8 0.7   ALKPHOS 40* 42*   AST 16 27   ALT 17 29   ANIONGAP 8 9       Significant Imaging: I have reviewed all pertinent imaging results/findings within the past 24 hours.    Assessment/Plan:      * Chest pain  Chest pain R/O ACS  Unstable angina  --Start Ranolazine 500mg BID  --Pain control PRN  --2D echo.  --EKG.  --Cardiac enzymes.  --TSH.   --Cardiology consult - appreciate recommendations   --continue amlodipine, HCTZ, lisinopril, Ranexa, and Imdur  --[facility] scheduled for 5/14 - NPO after mn      Hypertensive urgency  Patient has a current diagnosis of hypertensive urgency (without evidence of end organ damage) which is controlled.  Latest blood pressure and vitals reviewed-   Temp:  [97.6 °F (36.4 °C)-98.3 °F (36.8 °C)]   Pulse:  [62-74]   Resp:  [17-20]   BP: (100-162)/(54-72)   SpO2:  [95 %-99 %] .   Patient currently off IV antihypertensives.   Home meds for hypertension were reviewed and noted below.   Hypertension  Medications               amLODIPine-benazepriL (LOTREL) 5-40 mg per capsule Take 1 capsule by mouth once daily.    hydroCHLOROthiazide (MICROZIDE) 12.5 mg capsule Take 12.5 mg by mouth once daily.    nitroGLYCERIN (NITROSTAT) 0.4 MG SL tablet Place 0.4 mg under the tongue every 5 (five) minutes as needed for Chest pain.            Medication adjustment for hospital antihypertensives is as follows- Resume home meds-Ranexa, lisinopril, Imdur and spironolactone added    Will aim for controlled BP reduction by medications noted above. Monitor and mitigate end organ damage as indicated.    Chronic right flank pain  Patient states she has a kidney stone that she is waiting to pass  Start IV hydration      Hypothyroidism  Continue Levothyroxine 75mcg daily      Stage 3 chronic kidney disease  Creatine stable for now. BMP reviewed- noted Estimated Creatinine Clearance: 55.4 mL/min (based on SCr of 1 mg/dL). according to latest data. Based on current GFR, CKD stage is stage 3 - GFR 30-59.  Monitor UOP and serial BMP and adjust therapy as needed. Renally dose meds. Avoid nephrotoxic medications and procedures.    Kidney stones  Start IV fluid hydration 75cc of normal saline/hr        VTE Risk Mitigation (From admission, onward)           Ordered     heparin (porcine) injection 5,000 Units  Every 8 hours         05/10/24 0116     IP VTE HIGH RISK PATIENT  Once         05/10/24 0116     Place sequential compression device  Until discontinued         05/10/24 0116                    Discharge Planning   LILA: 5/13/2024     Code Status: Full Code   Is the patient medically ready for discharge?:     Reason for patient still in hospital (select all that apply): Imaging and Consult recommendations  Discharge Plan A: Home with family                  Briseida Kirkland NP  Department of Hospital Medicine   [facility]

## 2024-05-14 VITALS
HEIGHT: 65 IN | DIASTOLIC BLOOD PRESSURE: 57 MMHG | WEIGHT: 195.38 LBS | OXYGEN SATURATION: 96 % | TEMPERATURE: 98 F | BODY MASS INDEX: 32.55 KG/M2 | SYSTOLIC BLOOD PRESSURE: 107 MMHG | HEART RATE: 72 BPM | RESPIRATION RATE: 18 BRPM

## 2024-05-14 LAB
ALBUMIN SERPL BCP-MCNC: 4.1 G/DL (ref 3.5–5.2)
ALP SERPL-CCNC: 38 U/L (ref 55–135)
ALT SERPL W/O P-5'-P-CCNC: 38 U/L (ref 10–44)
ANION GAP SERPL CALC-SCNC: 8 MMOL/L (ref 8–16)
AST SERPL-CCNC: 37 U/L (ref 10–40)
BASOPHILS # BLD AUTO: 0.03 K/UL (ref 0–0.2)
BASOPHILS NFR BLD: 0.4 % (ref 0–1.9)
BILIRUB SERPL-MCNC: 0.4 MG/DL (ref 0.1–1)
BUN SERPL-MCNC: 25 MG/DL (ref 8–23)
CALCIUM SERPL-MCNC: 9 MG/DL (ref 8.7–10.5)
CHLORIDE SERPL-SCNC: 104 MMOL/L (ref 95–110)
CO2 SERPL-SCNC: 26 MMOL/L (ref 23–29)
CREAT SERPL-MCNC: 1.1 MG/DL (ref 0.5–1.4)
DIFFERENTIAL METHOD BLD: ABNORMAL
EOSINOPHIL # BLD AUTO: 0.3 K/UL (ref 0–0.5)
EOSINOPHIL NFR BLD: 3.6 % (ref 0–8)
ERYTHROCYTE [DISTWIDTH] IN BLOOD BY AUTOMATED COUNT: 13.9 % (ref 11.5–14.5)
EST. GFR  (NO RACE VARIABLE): 53.4 ML/MIN/1.73 M^2
GLUCOSE SERPL-MCNC: 128 MG/DL (ref 70–110)
HCT VFR BLD AUTO: 34 % (ref 37–48.5)
HGB BLD-MCNC: 11.1 G/DL (ref 12–16)
IMM GRANULOCYTES # BLD AUTO: 0.04 K/UL (ref 0–0.04)
IMM GRANULOCYTES NFR BLD AUTO: 0.5 % (ref 0–0.5)
LYMPHOCYTES # BLD AUTO: 2 K/UL (ref 1–4.8)
LYMPHOCYTES NFR BLD: 26.4 % (ref 18–48)
MCH RBC QN AUTO: 30.3 PG (ref 27–31)
MCHC RBC AUTO-ENTMCNC: 32.6 G/DL (ref 32–36)
MCV RBC AUTO: 93 FL (ref 82–98)
MONOCYTES # BLD AUTO: 0.7 K/UL (ref 0.3–1)
MONOCYTES NFR BLD: 8.9 % (ref 4–15)
NEUTROPHILS # BLD AUTO: 4.5 K/UL (ref 1.8–7.7)
NEUTROPHILS NFR BLD: 60.2 % (ref 38–73)
NRBC BLD-RTO: 0 /100 WBC
PLATELET # BLD AUTO: 214 K/UL (ref 150–450)
PMV BLD AUTO: 10 FL (ref 9.2–12.9)
POTASSIUM SERPL-SCNC: 4.5 MMOL/L (ref 3.5–5.1)
PROT SERPL-MCNC: 6.7 G/DL (ref 6–8.4)
RBC # BLD AUTO: 3.66 M/UL (ref 4–5.4)
SODIUM SERPL-SCNC: 138 MMOL/L (ref 136–145)
WBC # BLD AUTO: 7.51 K/UL (ref 3.9–12.7)

## 2024-05-14 PROCEDURE — 36415 COLL VENOUS BLD VENIPUNCTURE: CPT | Performed by: INTERNAL MEDICINE

## 2024-05-14 PROCEDURE — 99152 MOD SED SAME PHYS/QHP 5/>YRS: CPT | Performed by: INTERNAL MEDICINE

## 2024-05-14 PROCEDURE — 63600175 PHARM REV CODE 636 W HCPCS: Performed by: INTERNAL MEDICINE

## 2024-05-14 PROCEDURE — 85025 COMPLETE CBC W/AUTO DIFF WBC: CPT | Performed by: INTERNAL MEDICINE

## 2024-05-14 PROCEDURE — C1769 GUIDE WIRE: HCPCS | Performed by: INTERNAL MEDICINE

## 2024-05-14 PROCEDURE — 99152 MOD SED SAME PHYS/QHP 5/>YRS: CPT | Mod: ,,, | Performed by: INTERNAL MEDICINE

## 2024-05-14 PROCEDURE — 25000003 PHARM REV CODE 250: Performed by: INTERNAL MEDICINE

## 2024-05-14 PROCEDURE — 93458 L HRT ARTERY/VENTRICLE ANGIO: CPT | Mod: 26,,, | Performed by: INTERNAL MEDICINE

## 2024-05-14 PROCEDURE — 25000003 PHARM REV CODE 250

## 2024-05-14 PROCEDURE — 93458 L HRT ARTERY/VENTRICLE ANGIO: CPT | Performed by: INTERNAL MEDICINE

## 2024-05-14 PROCEDURE — 25000003 PHARM REV CODE 250: Performed by: NURSE PRACTITIONER

## 2024-05-14 PROCEDURE — 25000003 PHARM REV CODE 250: Performed by: HOSPITALIST

## 2024-05-14 PROCEDURE — 25500020 PHARM REV CODE 255: Performed by: INTERNAL MEDICINE

## 2024-05-14 PROCEDURE — 94660 CPAP INITIATION&MGMT: CPT

## 2024-05-14 PROCEDURE — C1887 CATHETER, GUIDING: HCPCS | Performed by: INTERNAL MEDICINE

## 2024-05-14 PROCEDURE — B211YZZ FLUOROSCOPY OF MULTIPLE CORONARY ARTERIES USING OTHER CONTRAST: ICD-10-PCS | Performed by: INTERNAL MEDICINE

## 2024-05-14 PROCEDURE — C1894 INTRO/SHEATH, NON-LASER: HCPCS | Performed by: INTERNAL MEDICINE

## 2024-05-14 PROCEDURE — 80053 COMPREHEN METABOLIC PANEL: CPT | Performed by: INTERNAL MEDICINE

## 2024-05-14 PROCEDURE — 99900031 HC PATIENT EDUCATION (STAT)

## 2024-05-14 PROCEDURE — 4A023N7 MEASUREMENT OF CARDIAC SAMPLING AND PRESSURE, LEFT HEART, PERCUTANEOUS APPROACH: ICD-10-PCS | Performed by: INTERNAL MEDICINE

## 2024-05-14 RX ORDER — FENTANYL CITRATE 50 UG/ML
INJECTION, SOLUTION INTRAMUSCULAR; INTRAVENOUS
Status: DISCONTINUED | OUTPATIENT
Start: 2024-05-14 | End: 2024-05-14 | Stop reason: HOSPADM

## 2024-05-14 RX ORDER — LIDOCAINE HYDROCHLORIDE 10 MG/ML
INJECTION, SOLUTION EPIDURAL; INFILTRATION; INTRACAUDAL; PERINEURAL
Status: DISCONTINUED | OUTPATIENT
Start: 2024-05-14 | End: 2024-05-14 | Stop reason: HOSPADM

## 2024-05-14 RX ORDER — SODIUM CHLORIDE 9 MG/ML
INJECTION, SOLUTION INTRAVENOUS CONTINUOUS
Status: ACTIVE | OUTPATIENT
Start: 2024-05-14 | End: 2024-05-14

## 2024-05-14 RX ORDER — MIDAZOLAM HYDROCHLORIDE 2 MG/2ML
INJECTION, SOLUTION INTRAMUSCULAR; INTRAVENOUS
Status: DISCONTINUED | OUTPATIENT
Start: 2024-05-14 | End: 2024-05-14 | Stop reason: HOSPADM

## 2024-05-14 RX ORDER — IODIXANOL 320 MG/ML
INJECTION, SOLUTION INTRAVASCULAR
Status: DISCONTINUED | OUTPATIENT
Start: 2024-05-14 | End: 2024-05-14 | Stop reason: HOSPADM

## 2024-05-14 RX ADMIN — LISINOPRIL 40 MG: 20 TABLET ORAL at 08:05

## 2024-05-14 RX ADMIN — LEVOTHYROXINE SODIUM 75 MCG: 0.03 TABLET ORAL at 05:05

## 2024-05-14 RX ADMIN — POLYETHYLENE GLYCOL 3350 17 G: 17 POWDER, FOR SOLUTION ORAL at 11:05

## 2024-05-14 RX ADMIN — AMLODIPINE BESYLATE 5 MG: 5 TABLET ORAL at 08:05

## 2024-05-14 RX ADMIN — ATORVASTATIN CALCIUM 40 MG: 40 TABLET, FILM COATED ORAL at 08:05

## 2024-05-14 RX ADMIN — SODIUM CHLORIDE: 9 INJECTION, SOLUTION INTRAVENOUS at 05:05

## 2024-05-14 RX ADMIN — SENNOSIDES AND DOCUSATE SODIUM 1 TABLET: 50; 8.6 TABLET ORAL at 09:05

## 2024-05-14 NOTE — PLAN OF CARE
Discharge orders and chart reviewed. No other discharge needs noted at this time. Pt is clear for discharge from case management. Pt is discharging to home.    Patient to follow up with cardiology as previously scheduled - appointment added to AVS     05/14/24 4589   Final Note   Assessment Type Final Discharge Note   Anticipated Discharge Disposition Home   What phone number can be called within the next 1-3 days to see how you are doing after discharge? [phone]   Hospital Resources/Appts/Education Provided Appointments scheduled and added to AVS   Post-Acute Status   Discharge Delays None known at this time

## 2024-05-14 NOTE — PROGRESS NOTES
Critical access hospital Medicine  Progress Note    Patient Name: Darshana Alicea  MRN: [id number]  Patient Class: IP- Inpatient   Admission Date: 5/9/2024  Length of Stay: 4 days  Attending Physician: Isidro Galeana MD  Primary Care Provider: Mitch Marquez MD        Subjective:     Principal Problem:Chest pain        HPI:  Ms. Alicea  is a 73 y.o. female with PMH significant for PMH HTN, LORENZO, HLD, angina, hypothyroidism, kidney stones who is being evaluated by the Hospital Medicine service after developing intermittent substernal chest pressure rated 2/10 in severity radiating to her left shoulder and arm since the previous night. Pt denied orthopnea or lower extremity swelling. She admits to some shortness of breath on exertion.  She also complained of right lower flank pain due to her history of kidney stones. She denies any urinary frequency, urgency, burning or retention. Patient admits to headache but denies visual changes. She had been prescribed Nitroglycerin for angina but did not take it because she feels it would worsen her headache. She has elevated BP but takes amlodipine/benazepril 5/40mg in the morning and her PCP suggested she take Amlodiipine 5mg at night as well. She had a stress test in October of 2023 with EF of 74%.  No wall motion abnormalities noted on rest or stress images.  Stress test negative for ischemia by EKG criteria.  Slight decreased uptake noted in the inferior apical wall both rest. She denies any focal weakness, numbness tingling or paresthesias.  Denies any abdominal pain.  Denies melena hematochezia or any GI bleeding.  Denies diarrhea or constipation.  Patient denies headache or visual changes.  Denies any cough or cold symptoms.  Denies dysuria, frequency or urgency.  Denies any other problems or complaints.    Pt denies history of afib, denies personal hx of blood clots. She lives with her  and performs all ADLs independently. Pt denies alcohol and denies  recreational drug use, denies smoking.      Overview/Hospital Course:  Patient was admitted with intermittent chest pain and uncontrolled HTN.  Cardiology was consulted.  She was initiated on Ranexa, Imdur and spironolactone.  Her EKG was nonischemic.  Her troponins were trended and remained negative.  Echocardiogram was obtained which revealed an EF of 60-65% with normal diastolic function.  She continued to have intermittent chest pain during her stay.  Patient was scheduled for left heart catheterization.    Interval History: scheduled for C this morning.  Reports consistent mild chest pain which is unchanged.    Review of Systems   Constitutional:  Positive for activity change.   Respiratory:  Positive for shortness of breath.    Cardiovascular:  Positive for chest pain (radiation to left jaw).   Musculoskeletal:  Positive for myalgias.   Neurological:  Positive for headaches.     Objective:     Vital Signs (Most Recent):  Temp: 97.7 °F (36.5 °C) (05/14/24 0701)  Pulse: 73 (05/14/24 0701)  Resp: 17 (05/14/24 0701)  BP: (!) 150/79 (05/14/24 0859)  SpO2: 97 % (05/14/24 0701) Vital Signs (24h Range):  Temp:  [97.7 °F (36.5 °C)-98.5 °F (36.9 °C)] 97.7 °F (36.5 °C)  Pulse:  [70-80] 73  Resp:  [16-17] 17  SpO2:  [95 %-99 %] 97 %  BP: (116-150)/(60-79) 150/79     Weight: 88.6 kg (195 lb 6.4 oz)  Body mass index is 33.02 kg/m².    Intake/Output Summary (Last 24 hours) at 5/14/2024 1002  Last data filed at 5/14/2024 0758  Gross per 24 hour   Intake 480 ml   Output 1 ml   Net 479 ml         Physical Exam  Constitutional:       General: She is not in acute distress.     Appearance: She is not ill-appearing, toxic-appearing or diaphoretic.   Cardiovascular:      Rate and Rhythm: Normal rate and regular rhythm.      Pulses: Normal pulses.      Heart sounds: Normal heart sounds.   Pulmonary:      Effort: Pulmonary effort is normal. No respiratory distress.      Breath sounds: Normal breath sounds.   Abdominal:      General:  Bowel sounds are normal. There is no distension.      Palpations: Abdomen is soft.      Tenderness: There is no abdominal tenderness.   Skin:     General: Skin is warm and dry.      Capillary Refill: Capillary refill takes less than 2 seconds.      Coloration: Skin is not jaundiced or pale.   Neurological:      Mental Status: She is alert and oriented to person, place, and time. Mental status is at baseline.   Psychiatric:         Mood and Affect: Mood normal.         Behavior: Behavior normal.             Significant Labs: All pertinent labs within the past 24 hours have been reviewed.  CBC:   Recent Labs   Lab 05/13/24 0456 05/14/24 0459   WBC 8.63 7.51   HGB 12.6 11.1*   HCT 38.1 34.0*    214     CMP:   Recent Labs   Lab 05/13/24 0456 05/14/24 0459    138   K 4.4 4.5   CL 98 104   CO2 30* 26   * 128*   BUN 25* 25*   CREATININE 1.4 1.1   CALCIUM 10.2 9.0   PROT 7.9 6.7   ALBUMIN 4.7 4.1   BILITOT 0.7 0.4   ALKPHOS 42* 38*   AST 27 37   ALT 29 38   ANIONGAP 9 8       Significant Imaging: I have reviewed all pertinent imaging results/findings within the past 24 hours.    Assessment/Plan:      * Chest pain  Chest pain R/O ACS  Unstable angina  --Start Ranolazine 500mg BID  --Pain control PRN  --2D echo.  --EKG.  --Cardiac enzymes.  --TSH.   --Cardiology consult - appreciate recommendations   --continue amlodipine, HCTZ, lisinopril, Ranexa, and Imdur  --[facility] scheduled for 5/14 - NPO after mn      Hypertensive urgency  Patient has a current diagnosis of hypertensive urgency (without evidence of end organ damage) which is controlled.  Latest blood pressure and vitals reviewed-   Temp:  [97.7 °F (36.5 °C)-98.5 °F (36.9 °C)]   Pulse:  [70-80]   Resp:  [16-17]   BP: (116-150)/(60-79)   SpO2:  [95 %-99 %] .   Patient currently off IV antihypertensives.   Home meds for hypertension were reviewed and noted below.   Hypertension Medications               amLODIPine-benazepriL (LOTREL) 5-40 mg per capsule  Take 1 capsule by mouth once daily.    hydroCHLOROthiazide (MICROZIDE) 12.5 mg capsule Take 12.5 mg by mouth once daily.    nitroGLYCERIN (NITROSTAT) 0.4 MG SL tablet Place 0.4 mg under the tongue every 5 (five) minutes as needed for Chest pain.            Medication adjustment for hospital antihypertensives is as follows- Resume home meds-Ranexa, lisinopril, Imdur and spironolactone added    Will aim for controlled BP reduction by medications noted above. Monitor and mitigate end organ damage as indicated.    Chronic right flank pain  Patient states she has a kidney stone that she is waiting to pass  Start IV hydration  May have some constipation as well.  Reports no BM since Thursday.  Per nursing she has been refusing miralax.  Will try lactulose after procedure today as long as she is stable.      Hypothyroidism  Continue Levothyroxine 75mcg daily      Stage 3 chronic kidney disease  Creatine stable for now. BMP reviewed- noted Estimated Creatinine Clearance: 50.4 mL/min (based on SCr of 1.1 mg/dL). according to latest data. Based on current GFR, CKD stage is stage 3 - GFR 30-59.  Monitor UOP and serial BMP and adjust therapy as needed. Renally dose meds. Avoid nephrotoxic medications and procedures.    Kidney stones  Start IV fluid hydration 75cc of normal saline/hr        VTE Risk Mitigation (From admission, onward)           Ordered     heparin (porcine) injection 5,000 Units  Every 8 hours         05/10/24 0116     IP VTE HIGH RISK PATIENT  Once         05/10/24 0116     Place sequential compression device  Until discontinued         05/10/24 0116                    Discharge Planning   LILA: 5/14/2024     Code Status: Full Code   Is the patient medically ready for discharge?:     Reason for patient still in hospital (select all that apply): Patient trending condition, Treatment, and Consult recommendations  Discharge Plan A: Home with family   Discharge Delays: (!) Procedure Scheduling (IR, OR, Labs, Echo,  Cath, Echo, EEG)              Keren Johnson, JOSE F  Department of Hospital Medicine   [facility]

## 2024-05-14 NOTE — DISCHARGE SUMMARY
[facility] Hospital Medicine  Discharge Summary      Patient Name: Darshana Alicea  MRN: [id number]  NII: [id number]  Patient Class: IP- Inpatient  Admission Date: 5/9/2024  Hospital Length of Stay: 4 days  Discharge Date and Time: 5/14/2024  4:05 PM  Attending Physician: Natalie att. providers found   Discharging Provider: JOSE F Borrero  Primary Care Provider: Mitch Marquez MD    Primary Care Team: Networked reference to record PCT     HPI:   Ms. Alicea  is a 73 y.o. female with PMH significant for PMH HTN, LORENZO, HLD, angina, hypothyroidism, kidney stones who is being evaluated by the Hospital Medicine service after developing intermittent substernal chest pressure rated 2/10 in severity radiating to her left shoulder and arm since the previous night. Pt denied orthopnea or lower extremity swelling. She admits to some shortness of breath on exertion.  She also complained of right lower flank pain due to her history of kidney stones. She denies any urinary frequency, urgency, burning or retention. Patient admits to headache but denies visual changes. She had been prescribed Nitroglycerin for angina but did not take it because she feels it would worsen her headache. She has elevated BP but takes amlodipine/benazepril 5/40mg in the morning and her PCP suggested she take Amlodiipine 5mg at night as well. She had a stress test in October of 2023 with EF of 74%.  No wall motion abnormalities noted on rest or stress images.  Stress test negative for ischemia by EKG criteria.  Slight decreased uptake noted in the inferior apical wall both rest. She denies any focal weakness, numbness tingling or paresthesias.  Denies any abdominal pain.  Denies melena hematochezia or any GI bleeding.  Denies diarrhea or constipation.  Patient denies headache or visual changes.  Denies any cough or cold symptoms.  Denies dysuria, frequency or urgency.  Denies any other problems or complaints.    Pt denies history of afib,  denies personal hx of blood clots. She lives with her  and performs all ADLs independently. Pt denies alcohol and denies recreational drug use, denies smoking.      Procedure(s) (LRB):  Angiogram, Coronary, with Left Heart Cath (N/A)      Hospital Course:   Patient was admitted with intermittent chest pain and uncontrolled HTN.  Cardiology was consulted.  She was initiated on Ranexa, Imdur and spironolactone.  Her EKG was nonischemic.  Her troponins were trended and remained negative.  Echocardiogram was obtained which revealed an EF of 60-65% with normal diastolic function.  She continued to have intermittent chest pain during her stay.  Patient was scheduled for left heart catheterization.  On 5/14/24, she underwent left heart catheterization with no stent placement required.  Post-procedure she was doing well and was cleared for discharge from the standpoint of cardiology.  Appropriate follow up was arranged.  Discharge instructions were reviewed and return precautions discussed.  She was discharged home in stable condition.     Goals of Care Treatment Preferences:  Code Status: Full Code      Consults:   Consults (From admission, onward)          Status Ordering Provider     Inpatient consult to Cardiology  Once        Provider:  Jean Morrell MD    Completed NOAM JANE            No new Assessment & Plan notes have been filed under this hospital service since the last note was generated.  Service: Hospital Medicine    Final Active Diagnoses:    Diagnosis Date Noted POA    PRINCIPAL PROBLEM:  Chest pain [R07.9] 12/10/2023 Yes    Chronic right flank pain [R10.9, G89.29] 05/10/2024 Yes    Hypertensive urgency [I16.0] 05/10/2024 Yes    Hypothyroidism [E03.9] 12/10/2023 Yes    Stage 3 chronic kidney disease [N18.30] 09/16/2016 Yes    Kidney stones [N20.0] 10/29/2012 Yes      Problems Resolved During this Admission:       Discharged Condition: stable    Disposition: Home or Self Care    Follow Up:    Follow-up Information       Brunilda Pascual NP. Go on 5/23/2024.    Specialty: Internal Medicine  Why: follow up as previously scheduled on 1PM AT [facility]  Contact information:  [address]  [facility]  [place] [address]  [phone]               Mitch Marquez MD Follow up in 1 week(s).    Specialty: Family Medicine  Why: As needed  Contact information:  [address] [facility] [address] [facility] [address] [facility]  [address]  [place] [address]  [phone]                           Patient Instructions:      Diet Cardiac     Notify your health care provider if you experience any of the following:  temperature >100.4     Notify your health care provider if you experience any of the following:  persistent nausea and vomiting or diarrhea     Notify your health care provider if you experience any of the following:  severe uncontrolled pain     Notify your health care provider if you experience any of the following:  difficulty breathing or increased cough     Notify your health care provider if you experience any of the following:  severe persistent headache     Notify your health care provider if you experience any of the following:  persistent dizziness, light-headedness, or visual disturbances     Notify your health care provider if you experience any of the following:  increased confusion or weakness     Activity as tolerated       Significant Diagnostic Studies: Labs: All labs within the past 24 hours have been reviewed    Pending Diagnostic Studies:       Procedure Component Value Units Date/Time    Basic metabolic panel [[id number]]     Order Status: Sent Lab Status: No result     Specimen: Blood            Medications:  Reconciled Home Medications:      Medication List        CONTINUE taking these medications      albuterol-ipratropium 2.5 mg-0.5 mg/3 mL nebulizer solution  Commonly known as: DUO-NEB  Inhale 3 mLs into the lungs every 6 (six) hours as needed.     amLODIPine 5 MG tablet  Commonly known as:  NORVASC  Take 5 mg by mouth every evening.     ciclopirox 8 % Soln  Commonly known as: PENLAC  Apply 1 drop topically nightly.     DULoxetine 60 MG capsule  Commonly known as: CYMBALTA  Take 60 mg by mouth once daily.     ergocalciferol 50,000 unit Cap  Commonly known as: ERGOCALCIFEROL  Take 50,000 Units by mouth every 7 days. SATURDAYS     levothyroxine 75 MCG tablet  Commonly known as: SYNTHROID  Take 75 mcg by mouth once daily.     nitroGLYCERIN 0.4 MG SL tablet  Commonly known as: NITROSTAT  Place 0.4 mg under the tongue every 5 (five) minutes as needed for Chest pain.     OCUVITE ORAL  Take 1 tablet by mouth once daily.     pramipexole 0.5 MG tablet  Commonly known as: MIRAPEX  Take 0.5 mg by mouth every evening.     RESTASIS 0.05 % ophthalmic emulsion  Generic drug: cycloSPORINE  Apply 1 drop to eye every 12 (twelve) hours.     rosuvastatin 20 MG tablet  Commonly known as: CRESTOR  Take 1 tablet by mouth once daily.            STOP taking these medications      hydroCHLOROthiazide 12.5 mg capsule  Commonly known as: MICROZIDE            ASK your doctor about these medications      pantoprazole 40 MG tablet  Commonly known as: PROTONIX  Take 40 mg by mouth once daily.     solifenacin 10 MG tablet  Commonly known as: VESICARE  Take 1 tablet (10 mg total) by mouth once daily.     traZODone 50 MG tablet  Commonly known as: DESYREL  Take 50 mg by mouth every evening.              Indwelling Lines/Drains at time of discharge:   Lines/Drains/Airways       None                   Time spent on the discharge of patient: 36 minutes         JOSE F Borrero  Department of Hospital Medicine  [facility]

## 2024-05-14 NOTE — CARE UPDATE
05/14/24 1100   Patient Assessment/Suction   Respiratory Effort Unlabored;Normal   Expansion/Accessory Muscles/Retractions no use of accessory muscles   Rhythm/Pattern, Respiratory shortness of breath   Cough Frequency no cough   Cough Type good;nonproductive   PRE-TX-O2   Device (Oxygen Therapy) room air   SpO2 (!) 94 %   Pulse Oximetry Type Intermittent   Pulse 66   Resp 16   BP (!) 108/54   Preset CPAP/BiPAP Settings   Mode Of Delivery BiPAP;Standby  (Patient home unit)   $ CPAP/BiPAP Daily Charge BiPAP/CPAP Daily

## 2024-05-14 NOTE — PLAN OF CARE
Patient AAOx3. Ambulate with one person assist. NPO after MN d/t angiogram procedure. Tele in place. Condition stable.

## 2024-05-14 NOTE — NURSING
1045: Pt back in room from cath lab. Dressing to site CDI on right wrist.  Will continue to monitor per protocol. Primary RN Bill notified.   1130: Dr. Baeza notified to speak [facility].

## 2024-05-14 NOTE — ASSESSMENT & PLAN NOTE
Chest pain R/O ACS  Unstable angina  -- Start Ranolazine 500mg BID  --Pain control PRN  --2D echo.  --EKG.  --Cardiac enzymes.  --TSH.   -- Cardiology consult    
Chest pain R/O ACS  Unstable angina  --Start Ranolazine 500mg BID  --Pain control PRN  --2D echo.  --EKG.  --Cardiac enzymes.  --TSH.   --Cardiology consult - appreciate recommendations   --continue amlodipine, HCTZ, lisinopril, Ranexa, and Imdur  --[facility] scheduled for 5/14 - NPO after mn    
Chest pain R/O ACS  Unstable angina  --Start Ranolazine 500mg BID  --Pain control PRN  --2D echo.  --EKG.  --Cardiac enzymes.  --TSH.   --Cardiology consult - appreciate recommendations   --continue amlodipine, HCTZ, lisinopril, Ranexa, and Imdur  --[facility] scheduled for 5/14 - NPO after mn    
Chest pain R/O ACS  Unstable angina  --Start Ranolazine 500mg BID  --Pain control PRN  --2D echo.  --EKG.  --Cardiac enzymes.  --TSH.   --Cardiology consult - appreciate recommendations   --continue amlodipine, HCTZ, lisinopril, Ranexa, and Imdur  --await recommendations regarding angiogram    
Continue Levothyroxine 75mcg daily    
Creatine stable for now. BMP reviewed- noted Estimated Creatinine Clearance: 50.4 mL/min (based on SCr of 1.1 mg/dL). according to latest data. Based on current GFR, CKD stage is stage 3 - GFR 30-59.  Monitor UOP and serial BMP and adjust therapy as needed. Renally dose meds. Avoid nephrotoxic medications and procedures.  
Creatine stable for now. BMP reviewed- noted Estimated Creatinine Clearance: 55.4 mL/min (based on SCr of 1 mg/dL). according to latest data. Based on current GFR, CKD stage is stage 3 - GFR 30-59.  Monitor UOP and serial BMP and adjust therapy as needed. Renally dose meds. Avoid nephrotoxic medications and procedures.  
Patient has a current diagnosis of hypertensive urgency (without evidence of end organ damage) which is controlled.  Latest blood pressure and vitals reviewed-   Temp:  [97.6 °F (36.4 °C)-98.3 °F (36.8 °C)]   Pulse:  [62-74]   Resp:  [17-20]   BP: (100-162)/(54-72)   SpO2:  [95 %-99 %] .   Patient currently off IV antihypertensives.   Home meds for hypertension were reviewed and noted below.   Hypertension Medications               amLODIPine-benazepriL (LOTREL) 5-40 mg per capsule Take 1 capsule by mouth once daily.    hydroCHLOROthiazide (MICROZIDE) 12.5 mg capsule Take 12.5 mg by mouth once daily.    nitroGLYCERIN (NITROSTAT) 0.4 MG SL tablet Place 0.4 mg under the tongue every 5 (five) minutes as needed for Chest pain.            Medication adjustment for hospital antihypertensives is as follows- Resume home meds-Ranexa, lisinopril, Imdur and spironolactone added    Will aim for controlled BP reduction by medications noted above. Monitor and mitigate end organ damage as indicated.  
Patient has a current diagnosis of hypertensive urgency (without evidence of end organ damage) which is controlled.  Latest blood pressure and vitals reviewed-   Temp:  [97.7 °F (36.5 °C)-98.5 °F (36.9 °C)]   Pulse:  [70-80]   Resp:  [16-17]   BP: (116-150)/(60-79)   SpO2:  [95 %-99 %] .   Patient currently off IV antihypertensives.   Home meds for hypertension were reviewed and noted below.   Hypertension Medications               amLODIPine-benazepriL (LOTREL) 5-40 mg per capsule Take 1 capsule by mouth once daily.    hydroCHLOROthiazide (MICROZIDE) 12.5 mg capsule Take 12.5 mg by mouth once daily.    nitroGLYCERIN (NITROSTAT) 0.4 MG SL tablet Place 0.4 mg under the tongue every 5 (five) minutes as needed for Chest pain.            Medication adjustment for hospital antihypertensives is as follows- Resume home meds-Ranexa, lisinopril, Imdur and spironolactone added    Will aim for controlled BP reduction by medications noted above. Monitor and mitigate end organ damage as indicated.  
Patient has a current diagnosis of hypertensive urgency (without evidence of end organ damage) which is controlled.  Latest blood pressure and vitals reviewed-   Temp:  [97.9 °F (36.6 °C)-98.5 °F (36.9 °C)]   Pulse:  [56-66]   Resp:  [16-18]   BP: (128-146)/(58-75)   SpO2:  [95 %-97 %] .   Patient currently off IV antihypertensives.   Home meds for hypertension were reviewed and noted below.   Hypertension Medications               amLODIPine-benazepriL (LOTREL) 5-40 mg per capsule Take 1 capsule by mouth once daily.    hydroCHLOROthiazide (MICROZIDE) 12.5 mg capsule Take 12.5 mg by mouth once daily.    nitroGLYCERIN (NITROSTAT) 0.4 MG SL tablet Place 0.4 mg under the tongue every 5 (five) minutes as needed for Chest pain.            Medication adjustment for hospital antihypertensives is as follows- Resume home meds-Ranexa, lisinopril and Imdur added    Will aim for controlled BP reduction by medications noted above. Monitor and mitigate end organ damage as indicated.  
Patient has a current diagnosis of hypertensive urgency (without evidence of end organ damage) which is uncontrolled.  Latest blood pressure and vitals reviewed-   Temp:  [97.9 °F (36.6 °C)-98.3 °F (36.8 °C)]   Pulse:  [56-78]   Resp:  [16-20]   BP: (145-183)/(65-79)   SpO2:  [94 %-100 %] .   Patient currently off IV antihypertensives.   Home meds for hypertension were reviewed and noted below.   Hypertension Medications               amLODIPine-benazepriL (LOTREL) 5-40 mg per capsule Take 1 capsule by mouth once daily.    hydroCHLOROthiazide (MICROZIDE) 12.5 mg capsule Take 12.5 mg by mouth once daily.    nitroGLYCERIN (NITROSTAT) 0.4 MG SL tablet Place 0.4 mg under the tongue every 5 (five) minutes as needed for Chest pain.            Medication adjustment for hospital antihypertensives is as follows- Resume home meds    Will aim for controlled BP reduction by medications noted above. Monitor and mitigate end organ damage as indicated.  
Patient states she has a kidney stone that she is waiting to pass  Start IV hydration    
Patient states she has a kidney stone that she is waiting to pass  Start IV hydration    
Patient states she has a kidney stone that she is waiting to pass  Start IV hydration  May have some constipation as well.  Reports no BM since Thursday.  Per nursing she has been refusing miralax.  Will try lactulose after procedure today as long as she is stable.    
Start IV fluid hydration 75cc of normal saline/hr    
Self

## 2024-05-14 NOTE — SUBJECTIVE & OBJECTIVE
Interval History: scheduled for [facility] this morning.  Reports consistent mild chest pain which is unchanged.    Review of Systems   Constitutional:  Positive for activity change.   Respiratory:  Positive for shortness of breath.    Cardiovascular:  Positive for chest pain (radiation to left jaw).   Musculoskeletal:  Positive for myalgias.   Neurological:  Positive for headaches.     Objective:     Vital Signs (Most Recent):  Temp: 97.7 °F (36.5 °C) (05/14/24 0701)  Pulse: 73 (05/14/24 0701)  Resp: 17 (05/14/24 0701)  BP: (!) 150/79 (05/14/24 0859)  SpO2: 97 % (05/14/24 0701) Vital Signs (24h Range):  Temp:  [97.7 °F (36.5 °C)-98.5 °F (36.9 °C)] 97.7 °F (36.5 °C)  Pulse:  [70-80] 73  Resp:  [16-17] 17  SpO2:  [95 %-99 %] 97 %  BP: (116-150)/(60-79) 150/79     Weight: 88.6 kg (195 lb 6.4 oz)  Body mass index is 33.02 kg/m².    Intake/Output Summary (Last 24 hours) at 5/14/2024 1002  Last data filed at 5/14/2024 0758  Gross per 24 hour   Intake 480 ml   Output 1 ml   Net 479 ml         Physical Exam  Constitutional:       General: She is not in acute distress.     Appearance: She is not ill-appearing, toxic-appearing or diaphoretic.   Cardiovascular:      Rate and Rhythm: Normal rate and regular rhythm.      Pulses: Normal pulses.      Heart sounds: Normal heart sounds.   Pulmonary:      Effort: Pulmonary effort is normal. No respiratory distress.      Breath sounds: Normal breath sounds.   Abdominal:      General: Bowel sounds are normal. There is no distension.      Palpations: Abdomen is soft.      Tenderness: There is no abdominal tenderness.   Skin:     General: Skin is warm and dry.      Capillary Refill: Capillary refill takes less than 2 seconds.      Coloration: Skin is not jaundiced or pale.   Neurological:      Mental Status: She is alert and oriented to person, place, and time. Mental status is at baseline.   Psychiatric:         Mood and Affect: Mood normal.         Behavior: Behavior normal.              Significant Labs: All pertinent labs within the past 24 hours have been reviewed.  CBC:   Recent Labs   Lab 05/13/24  0456 05/14/24 0459   WBC 8.63 7.51   HGB 12.6 11.1*   HCT 38.1 34.0*    214     CMP:   Recent Labs   Lab 05/13/24  0456 05/14/24 0459    138   K 4.4 4.5   CL 98 104   CO2 30* 26   * 128*   BUN 25* 25*   CREATININE 1.4 1.1   CALCIUM 10.2 9.0   PROT 7.9 6.7   ALBUMIN 4.7 4.1   BILITOT 0.7 0.4   ALKPHOS 42* 38*   AST 27 37   ALT 29 38   ANIONGAP 9 8       Significant Imaging: I have reviewed all pertinent imaging results/findings within the past 24 hours.

## 2024-05-20 ENCOUNTER — PATIENT OUTREACH (OUTPATIENT)
Dept: ADMINISTRATIVE | Facility: CLINIC | Age: 74
End: 2024-05-20
Payer: MEDICARE

## 2024-05-20 RX ORDER — BUPROPION HYDROCHLORIDE 300 MG/1
300 TABLET ORAL DAILY
COMMUNITY

## 2024-05-20 RX ORDER — BUSPIRONE HYDROCHLORIDE 15 MG/1
15 TABLET ORAL 3 TIMES DAILY
COMMUNITY

## 2024-05-20 NOTE — PROGRESS NOTES
C3 nurse spoke with Darshana Alicea  for a TCC post hospital discharge follow up call. The patient states that she had a HOSFU with Mitch Marquez MD, a non-[facility] provider on 5/16/2024.

## 2024-05-28 ENCOUNTER — HOSPITAL ENCOUNTER (EMERGENCY)
Facility: HOSPITAL | Age: 74
Discharge: HOME | End: 2024-05-29
Attending: EMERGENCY MEDICINE
Payer: MEDICARE

## 2024-05-28 ENCOUNTER — NURSE TRIAGE (OUTPATIENT)
Dept: ADMINISTRATIVE | Facility: CLINIC | Age: 74
End: 2024-05-28
Payer: MEDICARE

## 2024-05-28 ENCOUNTER — TELEPHONE (OUTPATIENT)
Dept: UROLOGY | Facility: CLINIC | Age: 74
End: 2024-05-28
Payer: MEDICARE

## 2024-05-28 DIAGNOSIS — M54.50 ACUTE LEFT-SIDED LOW BACK PAIN WITHOUT SCIATICA: Primary | ICD-10-CM

## 2024-05-28 DIAGNOSIS — N39.0 URINARY TRACT INFECTION WITHOUT HEMATURIA, SITE UNSPECIFIED: ICD-10-CM

## 2024-05-28 LAB
ALBUMIN SERPL BCP-MCNC: 4.1 G/DL (ref 3.5–5.2)
ALP SERPL-CCNC: 43 U/L (ref 55–135)
ALT SERPL W/O P-5'-P-CCNC: 14 U/L (ref 10–44)
ANION GAP SERPL CALC-SCNC: 8 MMOL/L (ref 8–16)
AST SERPL-CCNC: 13 U/L (ref 10–40)
BASOPHILS # BLD AUTO: 0.03 K/UL (ref 0–0.2)
BASOPHILS NFR BLD: 0.4 % (ref 0–1.9)
BILIRUB SERPL-MCNC: 0.5 MG/DL (ref 0.1–1)
BUN SERPL-MCNC: 13 MG/DL (ref 8–23)
CALCIUM SERPL-MCNC: 8.8 MG/DL (ref 8.7–10.5)
CHLORIDE SERPL-SCNC: 109 MMOL/L (ref 95–110)
CO2 SERPL-SCNC: 27 MMOL/L (ref 23–29)
CREAT SERPL-MCNC: 1.1 MG/DL (ref 0.5–1.4)
DIFFERENTIAL METHOD BLD: ABNORMAL
EOSINOPHIL # BLD AUTO: 0.3 K/UL (ref 0–0.5)
EOSINOPHIL NFR BLD: 3.3 % (ref 0–8)
ERYTHROCYTE [DISTWIDTH] IN BLOOD BY AUTOMATED COUNT: 13.5 % (ref 11.5–14.5)
EST. GFR  (NO RACE VARIABLE): 53.4 ML/MIN/1.73 M^2
GLUCOSE SERPL-MCNC: 113 MG/DL (ref 70–110)
HCT VFR BLD AUTO: 31.1 % (ref 37–48.5)
HGB BLD-MCNC: 10.5 G/DL (ref 12–16)
IMM GRANULOCYTES # BLD AUTO: 0.03 K/UL (ref 0–0.04)
IMM GRANULOCYTES NFR BLD AUTO: 0.4 % (ref 0–0.5)
INFLUENZA A, MOLECULAR: NEGATIVE
INFLUENZA B, MOLECULAR: NEGATIVE
LYMPHOCYTES # BLD AUTO: 2 K/UL (ref 1–4.8)
LYMPHOCYTES NFR BLD: 26.2 % (ref 18–48)
MCH RBC QN AUTO: 31 PG (ref 27–31)
MCHC RBC AUTO-ENTMCNC: 33.8 G/DL (ref 32–36)
MCV RBC AUTO: 92 FL (ref 82–98)
MONOCYTES # BLD AUTO: 0.7 K/UL (ref 0.3–1)
MONOCYTES NFR BLD: 9.7 % (ref 4–15)
NEUTROPHILS # BLD AUTO: 4.5 K/UL (ref 1.8–7.7)
NEUTROPHILS NFR BLD: 60 % (ref 38–73)
NRBC BLD-RTO: 0 /100 WBC
PLATELET # BLD AUTO: 230 K/UL (ref 150–450)
PMV BLD AUTO: 10 FL (ref 9.2–12.9)
POTASSIUM SERPL-SCNC: 3 MMOL/L (ref 3.5–5.1)
PROT SERPL-MCNC: 6.8 G/DL (ref 6–8.4)
RBC # BLD AUTO: 3.39 M/UL (ref 4–5.4)
SARS-COV-2 RDRP RESP QL NAA+PROBE: NEGATIVE
SODIUM SERPL-SCNC: 144 MMOL/L (ref 136–145)
SPECIMEN SOURCE: NORMAL
WBC # BLD AUTO: 7.51 K/UL (ref 3.9–12.7)

## 2024-05-28 PROCEDURE — U0002 COVID-19 LAB TEST NON-CDC: HCPCS | Performed by: NURSE PRACTITIONER

## 2024-05-28 PROCEDURE — 87502 INFLUENZA DNA AMP PROBE: CPT | Performed by: NURSE PRACTITIONER

## 2024-05-28 PROCEDURE — 25000003 PHARM REV CODE 250: Performed by: EMERGENCY MEDICINE

## 2024-05-28 PROCEDURE — 81001 URINALYSIS AUTO W/SCOPE: CPT | Performed by: EMERGENCY MEDICINE

## 2024-05-28 PROCEDURE — 96375 TX/PRO/DX INJ NEW DRUG ADDON: CPT

## 2024-05-28 PROCEDURE — 80053 COMPREHEN METABOLIC PANEL: CPT | Performed by: NURSE PRACTITIONER

## 2024-05-28 PROCEDURE — 85025 COMPLETE CBC W/AUTO DIFF WBC: CPT | Performed by: NURSE PRACTITIONER

## 2024-05-28 PROCEDURE — 96374 THER/PROPH/DIAG INJ IV PUSH: CPT

## 2024-05-28 PROCEDURE — 63600175 PHARM REV CODE 636 W HCPCS: Performed by: EMERGENCY MEDICINE

## 2024-05-28 PROCEDURE — 99285 EMERGENCY DEPT VISIT HI MDM: CPT | Mod: 25

## 2024-05-28 RX ORDER — HYDROMORPHONE HYDROCHLORIDE 1 MG/ML
1 INJECTION, SOLUTION INTRAMUSCULAR; INTRAVENOUS; SUBCUTANEOUS
Status: COMPLETED | OUTPATIENT
Start: 2024-05-28 | End: 2024-05-28

## 2024-05-28 RX ADMIN — POTASSIUM BICARBONATE 50 MEQ: 977.5 TABLET, EFFERVESCENT ORAL at 11:05

## 2024-05-28 RX ADMIN — HYDROMORPHONE HYDROCHLORIDE 1 MG: 1 INJECTION, SOLUTION INTRAMUSCULAR; INTRAVENOUS; SUBCUTANEOUS at 11:05

## 2024-05-28 NOTE — TELEPHONE ENCOUNTER
Pt states that she was diagnosis with a kidney stone months ago. Now c/o lower abdominal pain 4/10 and back pain 8/10. Pt states that back pain started on Sunday and is getting worst. Advised to be seen per protocol. VU.       Reason for Disposition   SEVERE back pain of sudden onset and age > 60 years    Additional Information   Negative: Passed out (i.e., fainted, collapsed and was not responding)   Negative: Shock suspected (e.g., cold/pale/clammy skin, too weak to stand, low BP, rapid pulse)   Negative: Sounds like a life-threatening emergency to the triager    Protocols used: Back Pain-A-[place]

## 2024-05-28 NOTE — FIRST PROVIDER EVALUATION
Emergency Department TeleTriage Encounter Note      CHIEF COMPLAINT    Chief Complaint   Patient presents with    Cough    Leg Swelling     Pt has multiple complaints, cough, leg swelling, kidney stone, back pain and hoarseness.        VITAL SIGNS   Initial Vitals [05/28/24 1623]   BP Pulse Resp Temp SpO2   (!) 168/84 77 18 98.4 °F (36.9 °C) 99 %      MAP       --            ALLERGIES    Review of patient's allergies indicates:   Allergen Reactions    Lyrica [pregabalin] Swelling    Sulfa (sulfonamide antibiotics) Swelling    Toradol [ketorolac] Swelling       PROVIDER TRIAGE NOTE  TeleTriage Note: Darshana Alicea, a nontoxic/well appearing, 73 y.o. female, presented to the ED with c/o cough, sore throat, leg swelling, and right lower abdominal pain that began Sunday. Denies fevers. Hoarse voiced noted.     All ED beds are full at present; patient notified of this status.  Patient seen and medically screened by Nurse Practitioner via teletriage. Orders initiated at triage to expedite care.  Patient is stable to return to the waiting room and will be placed in an ED bed when available.  Care will be transferred to an alternate provider when patient has been placed in an Exam Room from the [facility] for physical exam, additional orders, and disposition.  4:34 PM Anca Hernandez, DNP, FNP-C        ORDERS  Labs Reviewed   INFLUENZA A & B BY MOLECULAR   SARS-COV-2 RNA AMPLIFICATION, QUAL   CBC W/ AUTO DIFFERENTIAL   COMPREHENSIVE METABOLIC PANEL       ED Orders (720h ago, onward)      Start Ordered     Status Ordering Provider    05/28/24 1637 05/28/24 1636  X-Ray Chest PA And Lateral  1 time imaging         Ordered ANCA HERNANDEZ    05/28/24 1636 05/28/24 1636  COVID-19 Rapid Screening  STAT         Ordered ANCA HERNANDEZ    05/28/24 1636 05/28/24 1636  Influenza A & B by Molecular  Once         Ordered ANCA HERNANDEZ    05/28/24 1636 05/28/24 1636  Insert peripheral IV  Continuous         Ordered ANCA HERNANDEZ     05/28/24 1636 05/28/24 1636  CBC auto differential  STAT         Ordered SHAYNEMARINAPaul    05/28/24 1636 05/28/24 1636  Comprehensive metabolic panel  STAT         Ordered SHAYNEMARINAPaul    05/28/24 1636 05/28/24 1636  EKG 12-lead  Once         Ordered SHAYNE, MARINA LINDA              Virtual Visit Note: The provider triage portion of this emergency department evaluation and documentation was performed via [employer], a HIPAA-compliant telemedicine application, in concert with a tele-presenter in the room. A face to face patient evaluation with one of my colleagues will occur once the patient is placed in an emergency department room.      DISCLAIMER: This note was prepared with [employer] voice recognition transcription software. Garbled syntax, mangled pronouns, and other bizarre constructions may be attributed to that software system.

## 2024-05-28 NOTE — TELEPHONE ENCOUNTER
Spoke with patient, explained we do not have any appt for sooner with Jessica Contreras NP. Patient stated she just spoke with someone who recommended she go to the ED. Patient stated she is in pain and would go to the ED

## 2024-05-28 NOTE — TELEPHONE ENCOUNTER
----- Message from Christiano Patel sent at 5/28/2024  1:35 PM CDT -----  Contact: Self  Type: Needs Medical Advice  Who Called:  PT  Symptoms (please be specific):  She thinks she has a Kidney Stone  How long has patient had these symptoms:  since Sat    Best Call Back Number: [phone]   Additional Information: PT would like to be seen ASAP.

## 2024-05-29 VITALS
HEART RATE: 67 BPM | SYSTOLIC BLOOD PRESSURE: 146 MMHG | OXYGEN SATURATION: 98 % | RESPIRATION RATE: 18 BRPM | WEIGHT: 197 LBS | BODY MASS INDEX: 33.63 KG/M2 | DIASTOLIC BLOOD PRESSURE: 68 MMHG | TEMPERATURE: 98 F | HEIGHT: 64 IN

## 2024-05-29 LAB
BACTERIA #/AREA URNS HPF: NORMAL /HPF
BILIRUB UR QL STRIP: NEGATIVE
CLARITY UR: CLEAR
COLOR UR: YELLOW
GLUCOSE UR QL STRIP: NEGATIVE
HGB UR QL STRIP: NEGATIVE
KETONES UR QL STRIP: NEGATIVE
LEUKOCYTE ESTERASE UR QL STRIP: ABNORMAL
MICROSCOPIC COMMENT: NORMAL
NITRITE UR QL STRIP: NEGATIVE
OHS QRS DURATION: 88 MS
OHS QTC CALCULATION: 420 MS
PH UR STRIP: 6 [PH] (ref 5–8)
PROT UR QL STRIP: ABNORMAL
RBC #/AREA URNS HPF: 2 /HPF (ref 0–4)
SP GR UR STRIP: 1.02 (ref 1–1.03)
SQUAMOUS #/AREA URNS HPF: 8 /HPF
URN SPEC COLLECT METH UR: ABNORMAL
UROBILINOGEN UR STRIP-ACNC: ABNORMAL EU/DL
WBC #/AREA URNS HPF: 3 /HPF (ref 0–5)

## 2024-05-29 PROCEDURE — 63600175 PHARM REV CODE 636 W HCPCS: Performed by: EMERGENCY MEDICINE

## 2024-05-29 RX ORDER — ONDANSETRON HYDROCHLORIDE 2 MG/ML
4 INJECTION, SOLUTION INTRAVENOUS
Status: COMPLETED | OUTPATIENT
Start: 2024-05-29 | End: 2024-05-29

## 2024-05-29 RX ORDER — HYDROCODONE BITARTRATE AND ACETAMINOPHEN 10; 325 MG/1; MG/1
1 TABLET ORAL EVERY 6 HOURS PRN
Qty: 12 TABLET | Refills: 0 | Status: SHIPPED | OUTPATIENT
Start: 2024-05-29 | End: 2024-06-01

## 2024-05-29 RX ORDER — CEPHALEXIN 500 MG/1
500 CAPSULE ORAL 4 TIMES DAILY
Qty: 40 CAPSULE | Refills: 0 | Status: SHIPPED | OUTPATIENT
Start: 2024-05-29 | End: 2024-06-08

## 2024-05-29 RX ORDER — METHOCARBAMOL 500 MG/1
1000 TABLET, FILM COATED ORAL 4 TIMES DAILY
Qty: 40 TABLET | Refills: 0 | Status: SHIPPED | OUTPATIENT
Start: 2024-05-29 | End: 2024-06-03

## 2024-05-29 RX ADMIN — ONDANSETRON 4 MG: 2 INJECTION INTRAMUSCULAR; INTRAVENOUS at 12:05

## 2024-05-29 NOTE — ED PROVIDER NOTES
Encounter Date: 2024       History     Chief Complaint   Patient presents with    Cough    Leg Swelling     Pt has multiple complaints, cough, leg swelling, kidney stone, back pain and hoarseness.      Chief complaint is multiple complaints.  The patient thinks she may have a kidney stone because she has right sided back pain radiating to right lower quadrant.  She has no urinary tract symptoms.  She also complains of a nonproductive cough and a hoarse voice.  She also complains of slight bilateral ankle swelling.  She actually called her doctor today who took her off of amlodipine and told her to start another medicine which was sent to the pharmacy.        Review of patient's allergies indicates:   Allergen Reactions    Lyrica [pregabalin] Swelling    Sulfa (sulfonamide antibiotics) Swelling    Toradol [ketorolac] Swelling     Past Medical History:   Diagnosis Date    Anemia     Colon polyp     Hypothyroidism 12/10/2023    Kidney stone     Kidney stones 10/29/2012    Thyroid disease      Past Surgical History:   Procedure Laterality Date    ANGIOGRAM, CORONARY, WITH LEFT HEART CATHETERIZATION N/A 2024    Procedure: Angiogram, Coronary, with Left Heart Cath;  Surgeon: Caro Baeza MD;  Location: [facility] CATH/EP LAB;  Service: Cardiology;  Laterality: N/A;    BLADDER SURGERY      CHOLECYSTECTOMY      CYSTOSCOPY      HYSTERECTOMY      OOPHORECTOMY      tonsillectomy   1964     Family History   Problem Relation Name Age of Onset    Cancer Father      Cancer Paternal Grandfather      Heart disease Maternal Grandfather       Social History     Tobacco Use    Smoking status: Former     Current packs/day: 0.00     Types: Cigarettes     Start date: 1971     Quit date: 2001     Years since quittin.5   Substance Use Topics    Alcohol use: No    Drug use: No     Review of Systems   Constitutional:  Negative for chills and fever.   HENT:  Negative for ear pain, rhinorrhea and sore throat.     Eyes:  Negative for pain and visual disturbance.   Respiratory:  Positive for cough. Negative for shortness of breath.    Cardiovascular:  Negative for chest pain and palpitations.   Gastrointestinal:  Negative for abdominal pain, constipation, diarrhea, nausea and vomiting.   Genitourinary:  Negative for dysuria, frequency, hematuria and urgency.   Musculoskeletal:  Negative for back pain, joint swelling and myalgias.   Skin:  Negative for rash.        Minimal bilateral ankle swelling   Neurological:  Negative for dizziness, seizures, weakness and headaches.   Psychiatric/Behavioral:  Negative for dysphoric mood. The patient is not nervous/anxious.        Physical Exam     Initial Vitals [05/28/24 1623]   BP Pulse Resp Temp SpO2   (!) 168/84 77 18 98.4 °F (36.9 °C) 99 %      MAP       --         Physical Exam    Nursing note and vitals reviewed.  Constitutional: She appears well-developed and well-nourished.   HENT:   Head: Normocephalic and atraumatic.   Eyes: Conjunctivae, EOM and lids are normal. Pupils are equal, round, and reactive to light.   Neck: Trachea normal. Neck supple. No thyroid mass and no thyromegaly present.   Normal range of motion.  Cardiovascular:  Normal rate, regular rhythm and normal heart sounds.           Pulmonary/Chest: Effort normal and breath sounds normal.   Abdominal: Abdomen is soft. There is no abdominal tenderness.   Musculoskeletal:         General: Normal range of motion.      Cervical back: Normal range of motion and neck supple.     Neurological: She is alert and oriented to person, place, and time. She has normal strength and normal reflexes. No cranial nerve deficit or sensory deficit.   Skin: Skin is warm and dry.   Psychiatric: She has a normal mood and affect. Her speech is normal and behavior is normal. Judgment and thought content normal.         ED Course   Procedures  Labs Reviewed   CBC W/ AUTO DIFFERENTIAL - Abnormal; Notable for the following components:        Result Value    RBC 3.39 (*)     Hemoglobin 10.5 (*)     Hematocrit 31.1 (*)     All other components within normal limits   COMPREHENSIVE METABOLIC PANEL - Abnormal; Notable for the following components:    Potassium 3.0 (*)     Glucose 113 (*)     Alkaline Phosphatase 43 (*)     eGFR 53.4 (*)     All other components within normal limits   URINALYSIS, REFLEX TO URINE CULTURE - Abnormal; Notable for the following components:    Protein, UA Trace (*)     Urobilinogen, UA 2.0-3.0 (*)     Leukocytes, UA 1+ (*)     All other components within normal limits    Narrative:     Specimen Source->Urine   INFLUENZA A & B BY MOLECULAR   SARS-COV-2 RNA AMPLIFICATION, QUAL   INFLUENZA A AND B ANTIGEN   URINALYSIS MICROSCOPIC    Narrative:     Specimen Source->Urine          Imaging Results              CT Renal Stone Study ABD Pelvis WO (Final result)  Result time 05/29/24 00:06:23      Final result by Jesse Taylor MD (05/29/24 00:06:23)                   Impression:      Nonobstructing right renal calculus.  No evidence of hydronephrosis or obstructing ureteral calculus.    Moderate volume retained stool throughout the colon which may reflect constipation.    Cholecystectomy and hysterectomy.    Additional findings as above.      Electronically signed by: Jesse Taylor MD  Date:    05/29/2024  Time:    00:06               Narrative:    EXAMINATION:  CT RENAL STONE STUDY ABD PELVIS WO    CLINICAL HISTORY:  flank pain;    TECHNIQUE:  Low dose axial images, sagittal and coronal reformations were obtained from the lung bases to the pubic symphysis.  Contrast was not administered.    COMPARISON:  12/09/2023    FINDINGS:  There is mild bibasilar atelectasis or scarring at the lung bases.  No significant pleural fluid.  The visualized portions of the heart appear normal.    The liver is unremarkable in appearance on this limited non-contrast examination.  The gallbladder is surgically absent.  There is no intra-or  "extrahepatic biliary ductal dilatation.    There is a small hiatal hernia.  The stomach otherwise appears within normal limits.  The spleen, pancreas, and adrenal glands are unremarkable.    The kidneys are normal in size and location. There is a 0.3 cm nonobstructing right lower pole renal calculus.  There is no significant hydronephrosis.  No definite obstructing ureteral calculus identified.  The urinary bladder demonstrates no significant abnormality. Uterus is surgically absent.    The abdominal aorta is normal in course and caliber with moderate atherosclerotic calcification along its course.    The visualized loops of small and large bowel show no evidence of obstruction or inflammation. There is no CT evidence of acute appendicitis. There is moderate volume of retained stool throughout the colon which may reflect constipation.  There is no ascites, portal venous gas, or free intraperitoneal air.  There is redemonstration of mild fat stranding epicentered about the mesenteric root with scattered small mesenteric lymph nodes which may reflect "isatu mesentery", a nonspecific finding.    Osseous structures demonstrate degenerative change of the visualized spine.  The extraperitoneal soft tissues are unremarkable.                                       X-Ray Chest PA And Lateral (Final result)  Result time 05/28/24 19:02:03      Final result by Jesse Taylor MD (05/28/24 19:02:03)                   Impression:      No radiographic evidence of acute intrathoracic process.      Electronically signed by: Jeses Taylor MD  Date:    05/28/2024  Time:    19:02               Narrative:    EXAMINATION:  XR CHEST PA AND LATERAL    CLINICAL HISTORY:  Shortness of breath    TECHNIQUE:  PA and lateral views of the chest were performed.    COMPARISON:  05/09/2024    FINDINGS:  The cardiomediastinal silhouette appears within normal limits.  The lungs are symmetrically aerated without evidence of focal airspace " consolidation.  There is no pleural effusion or pneumothorax.  Osseous structures demonstrate degenerative changes.  There is postoperative change of the cervical spine.                                       Medications   potassium bicarbonate disintegrating tablet 50 mEq (50 mEq Oral Given 5/28/24 2351)   HYDROmorphone injection 1 mg (1 mg Intravenous Given 5/28/24 2352)   ondansetron injection 4 mg (4 mg Intravenous Given 5/29/24 0038)     Medical Decision Making  The patient had a CT scan no kidney stone in the ureter but kidney stone in the kidney as per previous CT scan.  The patient has no sciatica on reexam by me patient has pain to the right paravertebral area L5-S1.  Will treat as muscular pain for now.  CT scan also revealed constipation she was advised to take daily MiraLax and magnesium citrate she has a good bowel movement as well.  Patient be discharged in good condition.  She has white cells in the urine I will treat her for possible UTI as well.    Amount and/or Complexity of Data Reviewed  Radiology: ordered.    Risk  Prescription drug management.                                      Clinical Impression:  Final diagnoses:  [M54.50] Acute left-sided low back pain without sciatica (Primary)  [N39.0] Urinary tract infection without hematuria, site unspecified          ED Disposition Condition    Discharge Stable          ED Prescriptions       Medication Sig Dispense Start Date End Date Auth. Provider    cephALEXin (KEFLEX) 500 MG capsule Take 1 capsule (500 mg total) by mouth 4 (four) times daily. for 10 days 40 capsule 5/29/2024 6/8/2024 Chet Newell MD    HYDROcodone-acetaminophen (NORCO)  mg per tablet Take 1 tablet by mouth every 6 (six) hours as needed for Pain. 12 tablet 5/29/2024 6/1/2024 Chet Newell MD    methocarbamoL (ROBAXIN) 500 MG Tab Take 2 tablets (1,000 mg total) by mouth 4 (four) times daily. for 5 days 40 tablet 5/29/2024 6/3/2024 Chet Newell MD           Follow-up Information    None          Chet Newell MD  05/29/24 0275

## 2024-06-03 DIAGNOSIS — T17.928A FOOD IN RESPIRATORY TRACT, PART UNSPECIFIED CAUSING OTHER INJURY, INITIAL ENCOUNTER: Primary | ICD-10-CM

## 2024-06-05 DIAGNOSIS — T17.928A FOOD IN RESPIRATORY TRACT, PART UNSPECIFIED CAUSING OTHER INJURY, INITIAL ENCOUNTER: Primary | ICD-10-CM

## 2024-06-06 LAB
OHS QRS DURATION: 82 MS
OHS QRS DURATION: 84 MS
OHS QTC CALCULATION: 428 MS
OHS QTC CALCULATION: 432 MS

## 2024-06-12 ENCOUNTER — OFFICE VISIT (OUTPATIENT)
Dept: UROLOGY | Facility: CLINIC | Age: 74
End: 2024-06-12
Payer: MEDICARE

## 2024-06-12 VITALS — WEIGHT: 197.06 LBS | BODY MASS INDEX: 33.64 KG/M2 | HEIGHT: 64 IN

## 2024-06-12 DIAGNOSIS — N20.0 KIDNEY STONES: ICD-10-CM

## 2024-06-12 DIAGNOSIS — N32.81 OAB (OVERACTIVE BLADDER): Primary | ICD-10-CM

## 2024-06-12 LAB — POC RESIDUAL URINE VOLUME: 1 ML (ref 0–100)

## 2024-06-12 PROCEDURE — 51798 US URINE CAPACITY MEASURE: CPT | Mod: S$GLB,,, | Performed by: NURSE PRACTITIONER

## 2024-06-12 PROCEDURE — 1101F PT FALLS ASSESS-DOCD LE1/YR: CPT | Mod: CPTII,S$GLB,, | Performed by: NURSE PRACTITIONER

## 2024-06-12 PROCEDURE — 99999 PR PBB SHADOW E&M-EST. PATIENT-LVL IV: CPT | Mod: PBBFAC,,, | Performed by: NURSE PRACTITIONER

## 2024-06-12 PROCEDURE — 1160F RVW MEDS BY RX/DR IN RCRD: CPT | Mod: CPTII,S$GLB,, | Performed by: NURSE PRACTITIONER

## 2024-06-12 PROCEDURE — 99214 OFFICE O/P EST MOD 30 MIN: CPT | Mod: S$GLB,,, | Performed by: NURSE PRACTITIONER

## 2024-06-12 PROCEDURE — 1159F MED LIST DOCD IN RCRD: CPT | Mod: CPTII,S$GLB,, | Performed by: NURSE PRACTITIONER

## 2024-06-12 PROCEDURE — 4010F ACE/ARB THERAPY RXD/TAKEN: CPT | Mod: CPTII,S$GLB,, | Performed by: NURSE PRACTITIONER

## 2024-06-12 PROCEDURE — 3008F BODY MASS INDEX DOCD: CPT | Mod: CPTII,S$GLB,, | Performed by: NURSE PRACTITIONER

## 2024-06-12 PROCEDURE — 3044F HG A1C LEVEL LT 7.0%: CPT | Mod: CPTII,S$GLB,, | Performed by: NURSE PRACTITIONER

## 2024-06-12 PROCEDURE — 1111F DSCHRG MED/CURRENT MED MERGE: CPT | Mod: CPTII,S$GLB,, | Performed by: NURSE PRACTITIONER

## 2024-06-12 PROCEDURE — 1126F AMNT PAIN NOTED NONE PRSNT: CPT | Mod: CPTII,S$GLB,, | Performed by: NURSE PRACTITIONER

## 2024-06-12 PROCEDURE — 3288F FALL RISK ASSESSMENT DOCD: CPT | Mod: CPTII,S$GLB,, | Performed by: NURSE PRACTITIONER

## 2024-06-12 RX ORDER — METOPROLOL SUCCINATE 25 MG/1
1 TABLET, EXTENDED RELEASE ORAL NIGHTLY
COMMUNITY
Start: 2024-05-23 | End: 2025-05-23

## 2024-06-12 NOTE — PATIENT INSTRUCTIONS
-The patient was encouraged to drink good water intake, limit iced tea and vicky as well as foods high in oxalate.  They were cautioned to try to limit salt and red meat intake. Low oxalate diet (limit spinach, rhubarb, nuts, beets, potatoes, chocolate).  No vitamin C supplements. We also discussed adding citrate to the diet with the addition of magdaleno or lemon juice to their water or alternatively with crystal light.

## 2024-06-12 NOTE — PROGRESS NOTES
[facility]/Counts include 234 beds at the [facility]  Group: Felix/Avelina/Kisha/Timmy  NPs: Dilcia Irby/Jessica Contreras    Note today written by:Jessica Contreras  Date of Service: 06/12/2024      CHIEF COMPLAINT: F/u OAB    PRESENTING ILLNESS:    Darshana Alicea is a 73 y.o. female who presents for f/u OAB. Last clinic visit was 3/13/24    6/12/24  Had sleep study. + LORENZO, now wearing bi-pap  Stopped vesicare when started to wear bi-pap   Nocturia improved. Now only having nocturia x 1-2. Not bothersome  Denies daytime frequency. Occasional urgency if holds too long. Denies UUI  PVR 1 ml    Pt was seen in ED 5/28/24 for right back pain radiating to RLQ, felt she had kidney stone.  No ureteral stone seen on CT. Treated for MS pain and constipation.  She was given keflex for UTI. UA +1 leuk. Micro UA RBC 2, WBC 3. NO CULTURE.  Creatinine 1.1 / GFR 53.4  5/28/24 CT RSS:  The kidneys are normal in size and location. There is a 0.3 cm nonobstructing right lower pole renal calculus. There is no significant hydronephrosis. No definite obstructing ureteral calculus identified. The urinary bladder demonstrates no significant abnormality.     Decreased coke intake   Drinking more lemonade and sprite    3/13/24  On vesicare 10 mg  PVR 83 ml  Pt reports nocturia improved to 2-3 x per night but still bothersome  Had sleep study 3/6/24 and has follow up next month with MD to review  She only voids 3 x per day. + Urgency. Denies UUI  Denies dysuria, gross hematuria, flank pain, fever, chills, nausea or vomiting  Constipation controlled with colace      11/15/23   She has a remote hx of stones.   She has seen Dr. Cárdenas in August 2022 for microscopic hematuria and underwent cysto/RGP which was normal per op note. CT scan also normal.   Was being considered for an Interstim. Did a 3 day voiding study but never heard back with results.   No gross hematuria.      She was on Detrol 2 mg BID. This stopped working  and now she is on Vesicare 10 mg, just started this.      Today complaining of nocturia x 3-5. She does snore at night. Had a sleep study > 10 years ago which was reportedly normal.   During the day has some frequency but she is on a diuretic which she takes in the morning.    No incontinence.   She does have some urgency.      She does have issues with constipation. Started taking stool softener and now is having daily bowel movements.   Drinks 3 bottles of water and 1 coke.      Hx of stones treated with ESWL by Dr. Gomez.      UA today: negative for blood, leuks, nitrite   PVR today: 20 cc     Last urine culture: no documented UTIs     Family  hx: no  malignancy   Hx of CKD       REVIEW OF SYSTEMS: as stated above in hpi    PATIENT HISTORY:  Past Medical History:   Diagnosis Date    Anemia     Colon polyp     Hypothyroidism 12/10/2023    Kidney stone     Kidney stones 10/29/2012    Thyroid disease        Past Surgical History:   Procedure Laterality Date    ANGIOGRAM, CORONARY, WITH LEFT HEART CATHETERIZATION N/A 5/14/2024    Procedure: Angiogram, Coronary, with Left Heart Cath;  Surgeon: Caro Baeza MD;  Location: [facility] CATH[facility]EP LAB;  Service: Cardiology;  Laterality: N/A;    BLADDER SURGERY      CHOLECYSTECTOMY      CYSTOSCOPY      HYSTERECTOMY      OOPHORECTOMY      tonsillectomy   1964       Allergies:  Lyrica [pregabalin], Sulfa (sulfonamide antibiotics), and Toradol [ketorolac]      PHYSICAL EXAMINATION:  Constitutional: She is oriented to person, place, and time. She appears well-developed and well-nourished.  She is in no apparent distress.  Abdominal:  She exhibits no distension.  There is no CVA tenderness.   Neurological: She is alert and oriented to person, place, and time.   Psych: Cooperative with normal affect.      Physical Exam    LABS:      Lab Results   Component Value Date    CREATININE 1.1 05/28/2024     Lab Results   Component Value Date    HGBA1C 5.1 05/10/2024        IMPRESSION:    Encounter Diagnoses   Name Primary?    OAB (overactive bladder) Yes    Kidney stones          PLAN:  -Since nocturia was more related to LORENZO and improved with wearing bi-pap, vesicare discontinued. Pt has no daytime issues voiding.    -Will continue to monitor kidney stone with imaging every 6 months - 1 year  Recent CT with small nonobstructing kidney stone  BHANU/KUB 6 months, pt request to have done at [facility]  Instructed to bring all records from [facility] to follow up appt    -General risk factors for kidney stones and the conservative measures to prevent kidney stones in the future were discussed with the patient in detail.  The patient was encouraged to drink good water intake, limit iced tea and vicky as well as foods high in oxalate.  They were cautioned to try to limit salt and red meat intake. Low oxalate diet (limit spinach, rhubarb, nuts, beets, potatoes, chocolate).  No vitamin C supplements. We also discussed adding citrate to the diet with the addition of magdaleno or lemon juice to their water or alternatively with crystal light.      -RTC 6 months with imaging prior    I encouraged her or any of her family members to call or email me with questions and/or concerns.    30 minutes of total time spent on the encounter, which includes face to face time and non-face to face time preparing to see the patient (eg, review of tests), Obtaining and/or reviewing separately obtained history, Documenting clinical information in the electronic or other health record, Independently interpreting results (not separately reported) and communicating results to the patient/family/caregiver, or Care coordination (not separately reported).

## 2024-06-25 ENCOUNTER — TELEPHONE (OUTPATIENT)
Dept: UROGYNECOLOGY | Facility: CLINIC | Age: 74
End: 2024-06-25
Payer: MEDICARE

## 2024-06-25 NOTE — TELEPHONE ENCOUNTER
----- Message from Sherry Crawford sent at 6/25/2024 12:00 PM CDT -----  Name of Who is calling :EVAN HERNÁNDEZ [[id number]]        What is the request in detail:Pt would like to see if  still goes to [place] for appointments. Please assist         Can the clinic reply by [facility]:  no          [facility][phone]

## 2024-06-25 NOTE — TELEPHONE ENCOUNTER
Spoke to patient. She wanted to know if Dr. Doyle operates in [place], and I informed her that she does not perform surgery in [place]. Patient verbalized understanding and informed me that she will try to find a doctor closer to her, but may call back to schedule an appointment.

## 2024-07-01 ENCOUNTER — TELEPHONE (OUTPATIENT)
Dept: UROGYNECOLOGY | Facility: CLINIC | Age: 74
End: 2024-07-01
Payer: MEDICARE

## 2024-07-01 NOTE — PROGRESS NOTES
[facility]  MODIFIED BARIUM SWALLOW STUDY      Date: 7/2/2024     Name: Darshana Alicea   MRN: [id number]    Therapy Diagnosis: {Jenny Glorioso swallow dx:97596}    Physician: Mitch Marquez MD  Physician Orders: SLP Video Swallow  Medical Diagnosis from Referral: Food in respiratory tract, part unspecified causing other injury, initial encounter [T17.928A]     Date of Evaluation:  7/2/2024     Time In:  ***  Time Out:  ***  Total Billable Time: ***     Procedure Min.   Swallow and Oral Function Evaluation   ***   Fl Modified Barium Swallow Speech  ***     Precautions: {OP ST Precautions:06119}    Subjective   Date of Onset: ***    Past Medical History: Darshana Alicea  has a past medical history of Anemia, Colon polyp, Hypothyroidism (12/10/2023), Kidney stone, Kidney stones (10/29/2012), and Thyroid disease.  Darshana Alicea  has a past surgical history that includes tonsillectomy  (1964); Bladder surgery; Cholecystectomy; Cystoscopy; Hysterectomy; Oophorectomy; and ANGIOGRAM, CORONARY, WITH LEFT HEART CATHETERIZATION (N/A, 5/14/2024).    The patient is a 73 y.o. female who complains of {tgptreport:23033}.      -Current diet at home: ***  -Recommended diet from previous study: ***  -Therapy received: ***    In consideration of the interrelationships between body systems and swallowing, History was provided by patient, family***, and/or taken from chart review. The following are medical conditions present in the patient's history which can result in or be attributed to dysphagia:  Respiratory None noted in this category   Cardiovascular Hypertension treated with ACE inhibitors    Digestive Diarrhea    Infections  None noted in this category   Urinary Chronic kidney disease (CKD)   Endocrine Hypothyroidism   Nervous None noted in this category   Skeletal None noted in this category   Immune None noted in this category   Cancer None noted in this category   Psychiatric  None noted in this  "category   Congenital  None noted in this category   Other None noted in this category     The following observations were made:   -Mental status: {MENTAL STATUS -  PT:84247::"Alert","Cooperative"}  -Factors affecting performance: {[facility] Factor affecting Performance:88413::"no difficulties participating in the study"}  -Feeding Method: {[facility] Feeding Method:38874}    Respiratory Status:   -Respiratory Status: {[facility] ASSESSMENT; AN RESPIRATORY:16786::"room air"}  -Liters of supplemental O2: ***  -Tracheostomy type: ***  -Tracheostomy size: ***  -Speaking Valve: ***  -Was a speaking valve used for the study: {YES:17528}  -Ventilator oxygen percentage: ***  -Was respiratory therapy present to place valve: {YES:42363}    Medical Hx and Allergies:    Review of patient's allergies indicates:   Allergen Reactions    Lyrica [pregabalin] Swelling    Sulfa (sulfonamide antibiotics) Swelling    Toradol [ketorolac] Swelling       Pain Scale:  {0-10:10558::"0"}/10 on VAS currently.   Pain Location: ***    Objective     Modified Barium Swallow Study  Purpose: to evaluate anatomy and physiology of the oropharyngeal swallow, to determine effectiveness of rehabilitation strategies, and to determine diet consistency and intervention recommendations. The study was performed using the "Gold Standard" of 30 fps with as low as reasonably achievable (ALARA) exposure.     The patient was seen in radiology seated in {[facility] positionin::"High Jimenes's position"} in a video imaging chair for {tgviews:37472}. The study was conducted using {[facility] products:89170::"Varibar thin liquid (IDDSI 0)","Varibar nectar liquid (IDDSI 2)","Varibar pudding (IDDSI 4)","Peaches covered in Varibar powder (IDDSI 6/2)","solid coated in Varibar pudding (IDDSI 7)"}. She tolerated the procedure well.     A cranial nerve examination revealed the following:  {cranial nerve examination:97481}    CONSISTENCIES " ADMINISTERED:    Consistency  Findings Rosenbeck's Penetration/Aspiration Scale (PAS) Strategy Attempted    Thin (IDDSI 0)    5ml x***  10ml x***  Self-regulated cup sip x***   Consecutive cup sip x***   Self-regulated straw sip x***   Consecutive straw sip x*** Oral phase: {oral mbss:11547}    Pharyngeal phase: {pharyngeal MBSS:65537}    Esophageal screen: {esophageal mbss:66255} Best: {Rosenbeck's penetration/aspiration scale:74864}  {MBSS trace and gross:06554} {penetration/aspiration:73190} occurred {before, during, after:63337} the swallow    Worst: {Rosenbeck's penetration/aspiration scale:85365}  {MBSS trace and gross:24260} {penetration/aspiration:15142} occurred {before, during, after:09857} the swallow   {Dysphagia Strategies:55175}   Nectar thick (mildly thick/IDDSI 2)    5ml x***  10ml x*** Oral phase: {oral mbss:03145}    Pharyngeal phase: {pharyngeal MBSS:96738}     Esophogeal screen: {esophageal mbss:90180}   Best: {Rosenbeck's penetration/aspiration scale:44518}  {MBSS trace and gross:61526} {penetration/aspiration:68716} occurred {before, during, after:73683} the swallow    Worst: {Rosenbeck's penetration/aspiration scale:30723}  {MBSS trace and gross:97305} {penetration/aspiration:71198} occurred {before, during, after:82347} the swallow   {Dysphagia Strategies:39343}   Puree (extremely thick/ IDDSI 4)    5ml x***  10ml x***   Oral phase: {oral mbss:50201}    Pharyngeal phase: {pharyngeal MBSS:65358}    Esophageal screen: {esophageal mbss:72951}   Best: {Rosenbeck's penetration/aspiration scale:98476}  {MBSS trace and gross:30063} {penetration/aspiration:03098} occurred {before, during, after:19338} the swallow    Worst: {Rosenbeck's penetration/aspiration scale:99657}  {MBSS trace and gross:46491} {penetration/aspiration:50096} occurred {before, during, after:38840} the swallow   {Dysphagia Strategies:75537}   Mixed consistency (thin/ IDDSI 0 + soft and bite sized/ IDDSI 6)    - 1 peach in juice  "x***  - 2 peaches in juice x*** Oral phase: {oral mbss:14456}    Pharyngeal phase: {pharyngeal MBSS:18097}    Esophageal screen: {esophageal mbss:66740} Best: {Rosenbeck's penetration/aspiration scale:99467}  {MBSS trace and gross:57486} {penetration/aspiration:76840} occurred {before, during, after:36782} the swallow    Worst: {Rosenbeck's penetration/aspiration scale:73425}  {MBSS trace and gross:59695} {penetration/aspiration:27724} occurred {before, during, after:07539} the swallow   {Dysphagia Strategies:11104}   Solid (regular/ IDDSI 7)    - bite of cookie x*** Oral phase: {oral mbss:21439}    Pharyngeal phase: {pharyngeal MBSS:38569}    Esophageal screen: {esophageal mbss:91977} Best: {Rosenbeck's penetration/aspiration scale:11316}  {MBSS trace and gross:48052} {penetration/aspiration:29520} occurred {before, during, after:78773} the swallow    Worst: {Rosenbeck's penetration/aspiration scale:42189}  {MBSS trace and gross:46166} {penetration/aspiration:11441} occurred {before, during, after:06000} the swallow   {Dysphagia Strategies:58561}   Barium tablet     -Tablet administered in *** Timely and efficient oropharyngeal clearance    *** {Rosenbeck's penetration/aspiration scale:14040} {Dysphagia Strategies:86254}       Treatment   Total Treatment Time: {Blank single:30907::"*** minutes","n/a"}  Patient educated regarding results and recommendations of the evaluation. See the recommendations section below.    Education: Plan of Care, role of SLP in care, and anatomy and physiology of swallow mechanism as it relates to MBSS findings and recommendations were discussed with the patient. Patient expressed understanding. All questions were answered.     Assessment     Darshana Alicea is a 73 y.o. female referred for Modified Barium Swallow Study with a medical diagnosis of ***. The patient presents with {MILD/MOD/SEV:17713} {Swallowing Problems:58814} dysphagia as determined by the Dysphagia Outcome and Severity Scale " "(ARACELIS). {ARACELIS:38566}.    Modified Barium Swallow Study (MBSS) revealed oral phase characterized by {hmjmbsssrom:77547} lingual and labial strength and range of motion for tongue control, bolus preparation and transport. Lip closure was {hmjmbsssrom:84908} with {quickhuddle Lip closure:65492}. Bolus prep and mastication was {quickhuddle Bolus prep:24689::"timely and efficient"}. {[employer] Lingual Motion:55821::"Lingual motion was brisk for adequate bolus transport."} {Mimix Broadbandso Oral Residue MBSS:36091} The swallow was initiated when the head of the bolus {quickhuddle swallow initiation:54977}.    Pharyngeal phase characterized by {swallowinitiation:82371} initiation of swallow across consistencies. The soft palate elevated for {vpclosure:68844} of the velopharyngeal port. During pharyngeal swallow, {hmjmbsssrom:21913} base of tongue retraction, anterior hyoid excursion, laryngeal elevation, and pharyngeal stripping wave resulted in {vpclosure:49534} epiglottic inversion and UES opening with {pharyngealswallow:80609}. No {residue:32828} was observed in today's study. ***fatigue/CP bar/diverticulum/etc    On esophageal screen, {Corengi[place]leasantonTrippy Esophageal Impairments:40979} {Actions; was/were:992701} noted. ***Further esophageal imaging including EGD and/or barium esophagram as well as follow-up with GI is recommended.    Impressions: Patient presents with {Mimix Broadbandso swallow dx:25618}, likely {acute:84077} related to ***. ***. In consideration of the Dynamic Imaging Grade of Swallowing Toxicity (DIGEST) (Samantha et al, 2017), patient presents with {hmjsafetyseverity:73961} and {hmjefficiencyseverity:38626}. Patient appears to be at {Desc; low/moderate/high:252230} risk for aspiration related PNA in consideration of three pillars of aspiration pneumonia ([place], 2005) including *** oral health status, overall health/immune status, and laryngeal vestibule closure/severity of dysphagia. " "However, unable to assess risk related to aspiration pneumonia cause by the aspiration of gastric content. Patient appears to be a {[facility] [employer] [facility]:90436} candidate for behavioral swallow rehabilitation.     Functional Oral Intake Scale (FOIS)  The Functional Oral Intake Scale (FOIS) is an ordinal scale that is used to assess the current status and meaningful change in the oral intake. FOIS levels include:    TUBE DEPENDENT (levels 1-3) 1. No oral intake  2. Tube dependent with minimal/inconsistent oral intake  3. Tube supplements with consistent oral intake      TOTAL ORAL INTAKE (levels 4-7) 4. Total oral intake of a single consistency  5. Total oral intake of multiple consistencies requiring special preparation  6. Total oral intake with no special preparation, but must avoid specific foods or liquid items  7. Total oral intake with no restrictions     Patient is currently judged to be at FOIS level ***.      References:  MERLY Mendieta (2005, March). Pneumonia: Factors Beyond Aspiration. Perspectives in Swallowing and Swallowing Disorders (Dysphagia), 14, 10-16.  Gio KA, Arjun MP, Sonia DA, Roscoe KATZK, Nereida HY, Ryley RS, Shawn CD, Shay SY, Cristiano CP, Krystal J, Lazarus CL, May A, Gordy J, Gery JW, Alcon HM, Jimbo JS. Dynamic Imaging Grade of Swallowing Toxicity (DIGEST): Scale development and validation. Cancer. 2017 Jan 1;123(1):62-70. doi: 10.1002/cncr.74472. Epub 2016 Aug 26. PMID: 51448859; PMCID: [id number].    Recommendations:     Consistency Recommendations: {[facility] consistency recs:42513}  Risk Management: {Jenny England Swallow Guidelines:98378::"use good oral hygiene ","sit upright for all PO intake","increase physical mobility as tolerated"}  Specialist Referrals: {[facility] specialist referrals:11685}  Ancillary Tests: Consider {[facility] ancillary tests:78271::"N/a"} ***.  Therapy: {[facility] therapy:13154::"Dysphagia therapy is not recommended at this time."}  Follow-up exam: {[facility] follow-up " "exam:87539::"Follow up swallow study is not indicated at this time."}    Please contact [facility] at [phone] if there are questions re: the above or if we can be of additional service to this patient.    Therapist's Name:   Carolyn Sanchez CCC-SLP  Speech Language Pathologist    Date: 7/2/2024       "

## 2024-07-01 NOTE — TELEPHONE ENCOUNTER
----- Message from Darlin Jean sent at 6/28/2024  3:20 PM CDT -----  Type:  Needs Medical Advice    Who Called:  Anthony from Dr Mitch Crews    Would the patient rather a call back or a response via [facility]?  Call back    Best Call Back Number:  [phone]    Additional Information:  Confirming office received referral for pt.  Please call back to advise. Thanks!

## 2024-07-02 ENCOUNTER — HOSPITAL ENCOUNTER (OUTPATIENT)
Dept: RADIOLOGY | Facility: HOSPITAL | Age: 74
Discharge: HOME | End: 2024-07-02
Attending: FAMILY MEDICINE
Payer: MEDICARE

## 2024-07-02 ENCOUNTER — CLINICAL SUPPORT (OUTPATIENT)
Dept: REHABILITATION | Facility: HOSPITAL | Age: 74
End: 2024-07-02
Attending: FAMILY MEDICINE
Payer: MEDICARE

## 2024-07-02 DIAGNOSIS — T17.928A FOOD IN RESPIRATORY TRACT, PART UNSPECIFIED CAUSING OTHER INJURY, INITIAL ENCOUNTER: ICD-10-CM

## 2024-07-02 PROCEDURE — A9698 NON-RAD CONTRAST MATERIALNOC: HCPCS | Performed by: FAMILY MEDICINE

## 2024-07-02 PROCEDURE — 74230 X-RAY XM SWLNG FUNCJ C+: CPT | Mod: TC

## 2024-07-02 PROCEDURE — 92610 EVALUATE SWALLOWING FUNCTION: CPT | Mod: PO

## 2024-07-02 PROCEDURE — 25500020 PHARM REV CODE 255: Performed by: FAMILY MEDICINE

## 2024-07-02 PROCEDURE — 92611 MOTION FLUOROSCOPY/SWALLOW: CPT | Mod: PO

## 2024-07-02 PROCEDURE — 74230 X-RAY XM SWLNG FUNCJ C+: CPT | Mod: 26,,, | Performed by: RADIOLOGY

## 2024-07-02 RX ADMIN — BARIUM SULFATE 148 ML: 0.81 POWDER, FOR SUSPENSION ORAL at 02:07

## 2024-07-02 NOTE — PLAN OF CARE
[facility]  MODIFIED BARIUM SWALLOW STUDY      Date: 7/2/2024     Name: Darshana Alicea   MRN: [id number]    Therapy Diagnosis: WFL oral and pharyngeal phases of the swallow    Physician: Mitch Marquez MD  Physician Orders: SLP Video Swallow  Medical Diagnosis from Referral: Food in respiratory tract, part unspecified causing other injury, initial encounter [T17.928A]     Date of Evaluation:  7/2/2024     Time In:  1400  Time Out:  1430  Total Billable Time: 30     Procedure Min.   Swallow and Oral Function Evaluation   10   Fl Modified Barium Swallow Speech  20     Precautions: Standard, Dysphagia, and Aspiration    Subjective   Date of Onset: 6 months-1 year    Past Medical History: Darshana Alicea  has a past medical history of Anemia, Colon polyp, Hypothyroidism (12/10/2023), Kidney stone, Kidney stones (10/29/2012), and Thyroid disease.  Darshana Alicea  has a past surgical history that includes tonsillectomy  (1964); Bladder surgery; Cholecystectomy; Cystoscopy; Hysterectomy; Oophorectomy; and ANGIOGRAM, CORONARY, WITH LEFT HEART CATHETERIZATION (N/A, 5/14/2024).    The patient is a 73 y.o. female who complains of coughing, difficulty swallowing solids, difficulty swallowing liquids, and food getting stuck during the swallow.      -Current diet at home: Regular with thin liquids (IDDSI7/0)  -Recommended diet from previous study: N/A  -Therapy received: No prior therapy    In consideration of the interrelationships between body systems and swallowing, History was provided by patient, and/or taken from chart review. The following are medical conditions present in the patient's history which can result in or be attributed to dysphagia:  Respiratory None noted in this category   Cardiovascular Hypertension   Digestive Diarrhea    Infections  None noted in this category   Urinary Chronic kidney disease (CKD)   Endocrine Hypothyroidism   Nervous None noted in this category   Skeletal None  "noted in this category   Immune None noted in this category   Cancer None noted in this category   Psychiatric  None noted in this category   Congenital  None noted in this category   Other None noted in this category     The following observations were made:   -Mental status: Alert and Cooperative  -Factors affecting performance: no difficulties participating in the study  -Feeding Method: independent in self-feeding    Respiratory Status:   -Respiratory Status: room air      Medical Hx and Allergies:    Review of patient's allergies indicates:   Allergen Reactions    Lyrica [pregabalin] Swelling    Sulfa (sulfonamide antibiotics) Swelling    Toradol [ketorolac] Swelling       Pain Scale:  0/10 on VAS currently.   Pain Location: No pain indicated    Objective     Modified Barium Swallow Study  Purpose: to evaluate anatomy and physiology of the oropharyngeal swallow, to determine effectiveness of rehabilitation strategies, and to determine diet consistency and intervention recommendations. The study was performed using the "Gold Standard" of 30 fps with as low as reasonably achievable (ALARA) exposure.     The patient was seen in radiology seated in [place]'s position in a video imaging chair for lateral views of the larynx and an A/P view. The study was conducted using Varibar thin liquid (IDDSI 0), Varibar nectar liquid (IDDSI 2), Varibar pudding (IDDSI 4), Peaches covered in Varibar powder (IDDSI 6/2), and solid coated in Varibar pudding (IDDSI 7). She tolerated the procedure well.     A cranial nerve examination revealed the following:  Cranial Nerve Examination  Cranial Nerve 5: Trigeminal Nerve  Motor Jaw Posture at rest: Closed  Mandible Elevation/Depression: WFL  Mandible lateralization: WFL  Abnormal movement: absent Interpretation: Intact bilaterally    Sensory Forehead: WFL  Cheek: WFL  Jaw: WFL  Facial Pain: None noted Interpretation: Intact bilaterally      Cranial Nerve 7: Facial Nerve  Motor " Facial Symmetry: WNL  Wrinkle Forehead: WFL  Close eyes tightly: WFL  Labial Protrusion: WFL  Labial Retraction: WFL  Buccal Strength with Labial Seal: WFL  Abnormal movement: absent Interpretation: Intact bilaterally    Sensory Formal testing not completed.       Cranial Nerves IX and X: Glossopharyngeal and Vagus Nerves  Motor Palatal Symmetry (Rest): WNL  Palatal Symmetry (Movement): WNL  Cough: Perceptually strong  Voice Prior to PO intake: Clear  Resonance: Normal  Abnormal movement: absent Interpretation: Intact bilaterally      Cranial Nerve XII: Hypoglossal Nerve  Motor Tongue at rest: WNL  Lingual Protrusion: WNL  Lingual Protrusion against Resistance: WNL  Lingual Lateralization: WNL  Abnormal movement: absent Interpretation: Intact bilaterally      Other information:  Volitional Swallow: Able to palpate laryngeal rise  Mucosal Quality: No abnormal findings  Secretion Management: no overt deficits noted/observed  Dentition: Upper dentures and Lower dentures       CONSISTENCIES ADMINISTERED:    Consistency  Findings Rosenbeck's Penetration/Aspiration Scale (PAS) Strategy Attempted    Thin (IDDSI 0)    5ml x2  10ml x2  Self-regulated straw sip x2   Consecutive straw sip x1 Oral phase: WFL    Pharyngeal phase: WFL    Esophageal screen: aerophagia and delayed emptying/retention of bolus Best: (1) Material does not enter the airway      Worst: (2) Material enters the airway, remains above the vocal folds, and is ejected from the airway  Trace penetration occurred during the swallow with large consecutive sips   No strategies completed or needed   Nectar thick (mildly thick/IDDSI 2)    5ml x2  10ml x2 Oral phase: WFL    Pharyngeal phase: WFL        Best: (1) Material does not enter the airway      Worst: (1) Material does not enter the airway     No strategies completed or needed   Puree (extremely thick/ IDDSI 4)    5ml x1  10ml x1   Oral phase: WFL    Pharyngeal phase: WFL    Esophageal screen: retrograde flow  of bolus below the UES observed and delayed emptying/retention of bolus   Best: (1) Material does not enter the airway    Worst: (1) Material does not enter the airway     Liquid wash was effective to help clear the upper esophagus on esophageal screen   Mixed consistency (thin/ IDDSI 0 + soft and bite sized/ IDDSI 6)      - 2 peaches in juice x1 Oral phase: WFL    Pharyngeal phase: WFL   Best: (1) Material does not enter the airway      Worst: (1) Material does not enter the airway No strategies completed or needed   Solid (regular/ IDDSI 7)    - bite of cookie x1 Oral phase: WFL    Pharyngeal phase: WFL   Best: (1) Material does not enter the airway      Worst: (1) Material does not enter the airway     No strategies completed or needed       Treatment   Total Treatment Time: n/a  Patient educated regarding results and recommendations of the evaluation. See the recommendations section below.    Education: Plan of Care, role of SLP in care, and anatomy and physiology of swallow mechanism as it relates to MBSS findings and recommendations were discussed with the patient. Patient expressed understanding. All questions were answered.     Assessment     Darshana Alicea is a 73 y.o. female referred for Modified Barium Swallow Study with a medical diagnosis of Food in respiratory tract, part unspecified causing other injury, initial encounter [T17.888A] . The patient presents with WFL oropharyngeal swallow physiology.    Modified Barium Swallow Study (MBSS) revealed oral phase characterized by adequate lingual and labial strength and range of motion for tongue control, bolus preparation and transport. Lip closure was adequate with no labial escape. Bolus prep and mastication was timely and efficient. Lingual motion was brisk for adequate bolus transport. There was no significant oral residue. The swallow was initiated when the head of the bolus passed the ramus of the mandible.    Pharyngeal phase characterized by timely  initiation of swallow across consistencies. The soft palate elevated for complete of the velopharyngeal port. During pharyngeal swallow, adequate base of tongue retraction, anterior hyoid excursion, laryngeal elevation, and pharyngeal stripping wave resulted in complete epiglottic inversion and UES opening with no aspiration or significant pharyngeal residue was observed in today's study.  Trace, flash penetration x1 appreciated with large consecutive sips of thin liquids, however, this is WFL.     On esophageal screen, dysmotility, aerophagia, and retrograde flow below the pharyngo-esophageal segment  were noted. Further esophageal imaging including EGD and/or barium esophagram as well as follow-up with GI is recommended.    Impressions: Patient presents with WFL oral and pharyngeal phases of the swallow. In consideration of the Dynamic Imaging Grade of Swallowing Toxicity (DIGEST) (Samantha et al, 2017), patient presents with preserved safety of swallow and preserved efficiency of swallow. Patient appears to be at low risk for aspiration related PNA in consideration of three pillars of aspiration pneumonia ([place], 2005) including  oral health status, overall health/immune status, and laryngeal vestibule closure/severity of dysphagia. However, unable to assess risk related to aspiration pneumonia cause by the aspiration of gastric content. There is no further indication for skilled Speech Therapy intervention for dysphagia at this time.     Functional Oral Intake Scale (FOIS)  The Functional Oral Intake Scale (FOIS) is an ordinal scale that is used to assess the current status and meaningful change in the oral intake. FOIS levels include:    TUBE DEPENDENT (levels 1-3) 1. No oral intake  2. Tube dependent with minimal/inconsistent oral intake  3. Tube supplements with consistent oral intake      TOTAL ORAL INTAKE (levels 4-7) 4. Total oral intake of a single consistency  5. Total oral intake of multiple  consistencies requiring special preparation  6. Total oral intake with no special preparation, but must avoid specific foods or liquid items  7. Total oral intake with no restrictions     Patient is currently judged to be at FOIS level 7.      References:  MERLY Mendieta (2005, March). Pneumonia: Factors Beyond Aspiration. Perspectives in Swallowing and Swallowing Disorders (Dysphagia), 14, 10-16.  Gio KA, Arjun MP, Sonia DA, Roscoe JK, Nereida HY, Ryley RS, Shawn CD, Shay SY, Cristiano CP, Krystal J, Lazarus CL, May A, Gordy J, Grey JW, Alcon HM, Jimbo JS. Dynamic Imaging Grade of Swallowing Toxicity (DIGEST): Scale development and validation. Cancer. 2017 Jan 1;123(1):62-70. doi: 10.1002/cncr.33972. Epub 2016 Aug 26. PMID: [id number]; PMCID: [id number].    Recommendations:     Consistency Recommendations: Thin liquids (IDDSI 0) and Regular consistencies (IDDSI 7).   Risk Management: use good oral hygiene , sit upright for all PO intake, behavioral reflux precautions, alternate bites and sips, remain upright for at least 2-3 hours following any PO intake, and eat small meals throughout the day to reduce discomfort associated with delayed emptying of the esophagus  Specialist Referrals: GI  Therapy: Dysphagia therapy is not recommended at this time.  Follow-up exam: Follow up swallow study is not indicated at this time.    Please contact [facility] at [phone] if there are questions re: the above or if we can be of additional service to this patient.    Therapist's Name:   Carolyn Sanchez CCC-SLP  Speech Language Pathologist    Date: 7/2/2024

## 2024-07-25 ENCOUNTER — OFFICE VISIT (OUTPATIENT)
Dept: UROGYNECOLOGY | Facility: CLINIC | Age: 74
End: 2024-07-25
Payer: MEDICARE

## 2024-07-25 DIAGNOSIS — R35.0 URINARY FREQUENCY: Primary | ICD-10-CM

## 2024-07-25 DIAGNOSIS — N81.11 CYSTOCELE, MIDLINE: ICD-10-CM

## 2024-07-25 DIAGNOSIS — N95.2 ATROPHIC VAGINITIS: ICD-10-CM

## 2024-07-25 LAB
BILIRUBIN, UA POC OHS: NEGATIVE
BLOOD, UA POC OHS: ABNORMAL
CLARITY, UA POC OHS: CLEAR
COLOR, UA POC OHS: YELLOW
GLUCOSE, UA POC OHS: NEGATIVE
KETONES, UA POC OHS: NEGATIVE
LEUKOCYTES, UA POC OHS: NEGATIVE
NITRITE, UA POC OHS: NEGATIVE
PH, UA POC OHS: 6
PROTEIN, UA POC OHS: NEGATIVE
SPECIFIC GRAVITY, UA POC OHS: >=1.03
UROBILINOGEN, UA POC OHS: 1

## 2024-07-25 PROCEDURE — 99999 PR PBB SHADOW E&M-EST. PATIENT-LVL III: CPT | Mod: PBBFAC,,, | Performed by: NURSE PRACTITIONER

## 2024-07-25 NOTE — PROGRESS NOTES
Subjective:       Patient ID: Darshana Alicea is a 73 y.o. female.    Chief Complaint: Vaginal Prolapse      Darshana Alicea is a 73 y.o. female.  Who is new to me, presents today for consult in regards to vaginal prolapse.  She went to see her primary care last month and felt that she had a tampon stuck in her vagina.  Her primary care did an exam and told the patient that she had a cystocele.  Patient was coming in today for evaluation.  She has urinary frequency every 3-4 hours or so during the day.  She has nocturia times 1-2.  She denies any PVF.  She does have some urge incontinence if she waits too long.  She denies any vaginal discharge or bleeding.  She denies any bulging sensation but again does feel like there is something stuck in the vaginal area.  She has had a hysterectomy in the past for bleeding.  She is not sexually active.  She does not have a personal history of breast cancer.  She is considering the pessary but is concerned about being able to remove and replace it herself.  She denies any other acute complaints/concerns at this time is ready to proceed with the exam.    Review of Systems   Constitutional:  Negative for activity change, fever and unexpected weight change.   HENT:  Negative for hearing loss.    Eyes:  Negative for visual disturbance.   Respiratory:  Negative for shortness of breath and wheezing.    Cardiovascular:  Negative for chest pain, palpitations and leg swelling.   Gastrointestinal:  Negative for abdominal pain, constipation and diarrhea.   Genitourinary:  Positive for urgency. Negative for dyspareunia, dysuria, frequency, vaginal bleeding and vaginal discharge.   Musculoskeletal:  Negative for gait problem and neck pain.   Skin:  Negative for rash and wound.   Allergic/Immunologic: Negative for immunocompromised state.   Neurological:  Negative for tremors, speech difficulty and weakness.   Hematological:  Does not bruise/bleed easily.   Psychiatric/Behavioral:  Negative for  agitation and confusion.        Objective:      Physical Exam  Vitals reviewed. Exam conducted with a chaperone present.   Constitutional:       General: She is not in acute distress.     Appearance: She is well-developed.   HENT:      Head: Normocephalic and atraumatic.   Neck:      Thyroid: No thyromegaly.   Pulmonary:      Effort: Pulmonary effort is normal. No respiratory distress.   Abdominal:      Palpations: Abdomen is soft.      Tenderness: There is no abdominal tenderness.      Hernia: No hernia is present.   Musculoskeletal:         General: Normal range of motion.      Cervical back: Normal range of motion.   Skin:     General: Skin is warm and dry.      Findings: No rash.   Neurological:      Mental Status: She is alert and oriented to person, place, and time.   Psychiatric:         Mood and Affect: Mood normal.         Behavior: Behavior normal.         Thought Content: Thought content normal.       Pelvic Exam:  V: No lesions. No palpable nodes.   Va:  No discharge or bleeding.  Tissue is mildly atrophic.  Midline cystocele BA equals -1  Meatus:No caruncle or stenosis  Urethra: Non tender. No suburethral masses.  Cx/Cuff: Normal   Uterus:  Surgically absent  Ad: No mass or tenderness.  Levators :Symmetrical. Normal tone. Non tender.  BL: Non tender  RV: No hemorrhoids.      Assessment:       1. Urinary frequency    2. Cystocele, midline    3. Atrophic vaginitis        Procedure note after Betadine irrigation of the vagina a # 3 ring pessary was placed.  The patient felt that this was somewhat uncomfortable for her.  The #3 ring pessary was removed and a #2 ring pessary was placed.  The patient i stated that this felt much more comfortable.  She did have the sensation that something was there but it was not painful for her.  She was able to do various exercises in the room and retain the pessary.  NP did remind patient that the 1st 24-48 hours as most like it time for the pessary to fall out.  NP also  discussed with patient that if she was straining with a bowel movement that the pessary may fall out.  Patient was aware that she can not flush a pessary down the toilet.  NP reviewed removal and replacement of the pessary.  Patient is not required to remove and replace a pessary herself.      Plan:       Urinary frequency monitor  -     POCT Urinalysis(Instrument)    Cystocele, midline trial of ring pessary.    Atrophic vaginitis consider Estrace cream in the future    RTC 1 month

## 2024-08-06 ENCOUNTER — OFFICE VISIT (OUTPATIENT)
Dept: UROGYNECOLOGY | Facility: CLINIC | Age: 74
End: 2024-08-06
Payer: MEDICARE

## 2024-08-06 VITALS — WEIGHT: 196 LBS | HEIGHT: 64 IN | BODY MASS INDEX: 33.46 KG/M2

## 2024-08-06 DIAGNOSIS — N95.2 ATROPHIC VAGINITIS: ICD-10-CM

## 2024-08-06 DIAGNOSIS — N81.11 CYSTOCELE, MIDLINE: ICD-10-CM

## 2024-08-06 DIAGNOSIS — Z46.89 PESSARY MAINTENANCE: ICD-10-CM

## 2024-08-06 DIAGNOSIS — R35.0 URINARY FREQUENCY: Primary | ICD-10-CM

## 2024-08-06 PROCEDURE — 99999 PR PBB SHADOW E&M-EST. PATIENT-LVL III: CPT | Mod: PBBFAC,,, | Performed by: NURSE PRACTITIONER

## 2024-08-06 RX ORDER — ESTRADIOL 0.1 MG/G
CREAM VAGINAL
Qty: 42.5 G | Refills: 3 | Status: SHIPPED | OUTPATIENT
Start: 2024-08-06

## 2024-09-25 ENCOUNTER — HOSPITAL ENCOUNTER (OUTPATIENT)
Dept: RADIOLOGY | Facility: HOSPITAL | Age: 74
Discharge: HOME | End: 2024-09-25
Attending: PHYSICIAN ASSISTANT
Payer: MEDICARE

## 2024-09-25 ENCOUNTER — OFFICE VISIT (OUTPATIENT)
Dept: ORTHOPEDICS | Facility: CLINIC | Age: 74
End: 2024-09-25
Payer: MEDICARE

## 2024-09-25 VITALS — WEIGHT: 201 LBS | HEIGHT: 64 IN | BODY MASS INDEX: 34.31 KG/M2

## 2024-09-25 DIAGNOSIS — M72.0 PALMAR FIBROMATOSIS: ICD-10-CM

## 2024-09-25 DIAGNOSIS — M65.351 TRIGGER LITTLE FINGER OF RIGHT HAND: Primary | ICD-10-CM

## 2024-09-25 PROCEDURE — 99213 OFFICE O/P EST LOW 20 MIN: CPT | Mod: 25,S$GLB,, | Performed by: PHYSICIAN ASSISTANT

## 2024-09-25 PROCEDURE — 3288F FALL RISK ASSESSMENT DOCD: CPT | Mod: CPTII,S$GLB,, | Performed by: PHYSICIAN ASSISTANT

## 2024-09-25 PROCEDURE — 1101F PT FALLS ASSESS-DOCD LE1/YR: CPT | Mod: CPTII,S$GLB,, | Performed by: PHYSICIAN ASSISTANT

## 2024-09-25 PROCEDURE — 99999 PR PBB SHADOW E&M-EST. PATIENT-LVL III: CPT | Mod: PBBFAC,,, | Performed by: PHYSICIAN ASSISTANT

## 2024-09-25 PROCEDURE — 1125F AMNT PAIN NOTED PAIN PRSNT: CPT | Mod: CPTII,S$GLB,, | Performed by: PHYSICIAN ASSISTANT

## 2024-09-25 PROCEDURE — 73130 X-RAY EXAM OF HAND: CPT | Mod: TC,PN,RT

## 2024-09-25 PROCEDURE — 3008F BODY MASS INDEX DOCD: CPT | Mod: CPTII,S$GLB,, | Performed by: PHYSICIAN ASSISTANT

## 2024-09-25 PROCEDURE — 4010F ACE/ARB THERAPY RXD/TAKEN: CPT | Mod: CPTII,S$GLB,, | Performed by: PHYSICIAN ASSISTANT

## 2024-09-25 PROCEDURE — 73130 X-RAY EXAM OF HAND: CPT | Mod: 26,RT,, | Performed by: RADIOLOGY

## 2024-09-25 PROCEDURE — 3044F HG A1C LEVEL LT 7.0%: CPT | Mod: CPTII,S$GLB,, | Performed by: PHYSICIAN ASSISTANT

## 2024-09-25 PROCEDURE — 1159F MED LIST DOCD IN RCRD: CPT | Mod: CPTII,S$GLB,, | Performed by: PHYSICIAN ASSISTANT

## 2024-09-25 PROCEDURE — 1160F RVW MEDS BY RX/DR IN RCRD: CPT | Mod: CPTII,S$GLB,, | Performed by: PHYSICIAN ASSISTANT

## 2024-09-25 PROCEDURE — 20550 NJX 1 TENDON SHEATH/LIGAMENT: CPT | Mod: RT,S$GLB,, | Performed by: PHYSICIAN ASSISTANT

## 2024-09-25 RX ORDER — HYDROCHLOROTHIAZIDE 12.5 MG/1
12.5 TABLET ORAL
COMMUNITY
Start: 2024-08-29

## 2024-09-25 RX ORDER — METHYLPREDNISOLONE ACETATE 40 MG/ML
40 INJECTION, SUSPENSION INTRA-ARTICULAR; INTRALESIONAL; INTRAMUSCULAR; SOFT TISSUE
Status: DISCONTINUED | OUTPATIENT
Start: 2024-09-25 | End: 2024-09-25 | Stop reason: HOSPADM

## 2024-09-25 RX ORDER — ESOMEPRAZOLE MAGNESIUM 40 MG/1
40 CAPSULE, DELAYED RELEASE ORAL EVERY MORNING
COMMUNITY
Start: 2024-09-19

## 2024-09-25 RX ORDER — BENAZEPRIL HYDROCHLORIDE 40 MG/1
40 TABLET ORAL
COMMUNITY
Start: 2024-08-24

## 2024-09-25 RX ADMIN — METHYLPREDNISOLONE ACETATE 40 MG: 40 INJECTION, SUSPENSION INTRA-ARTICULAR; INTRALESIONAL; INTRAMUSCULAR; SOFT TISSUE at 11:09

## 2024-09-25 NOTE — PROCEDURES
Tendon Sheath    Date/Time: 9/25/2024 11:45 AM    Performed by: Aristides Samuel PA  Authorized by: Aristides Samuel PA    Consent Done?:  Yes (Verbal)  Indications:  Pain  Site marked: the procedure site was marked    Timeout: prior to procedure the correct patient, procedure, and site was verified    Prep: patient was prepped and draped in usual sterile fashion      Local anesthesia used?: Yes    Anesthesia:  Local infiltration  Local anesthetic:  Lidocaine 1% without epinephrine  Location:  Small finger  Site:  R small MCP  Ultrasonic guidance for needle placement?: No    Needle size:  25 G  Medications:  40 mg methylPREDNISolone acetate 40 mg/mL  Patient tolerance:  Patient tolerated the procedure well with no immediate complications

## 2024-09-25 NOTE — PROGRESS NOTES
[facility]  [address]  [place] [address]  Phone: [phone]   [phone]    Patient's PCP: Mitch Marquez MD  Referring Provider: No ref. provider found    Subjective:      Chief Complaint:   Chief Complaint   Patient presents with    Right Hand - Pain     Right hand, right 5th finger x year, acts like a trigger finger and now the ring finger is triggering, and has a lump in palm of hand, weakness, no numbness or tingling, has burning pain       Past Medical History:   Diagnosis Date    Anemia     Colon polyp     Hypothyroidism 12/10/2023    Kidney stone     Kidney stones 10/29/2012    Thyroid disease        Past Surgical History:   Procedure Laterality Date    ANGIOGRAM, CORONARY, WITH LEFT HEART CATHETERIZATION N/A 5/14/2024    Procedure: Angiogram, Coronary, with Left Heart Cath;  Surgeon: Caro Baeza MD;  Location: [facility] CATH/EP LAB;  Service: Cardiology;  Laterality: N/A;    BLADDER SURGERY      CHOLECYSTECTOMY      CYSTOSCOPY      HYSTERECTOMY      OOPHORECTOMY      tonsillectomy   1964       Current Outpatient Medications   Medication Sig    amLODIPine (NORVASC) 5 MG tablet Take 5 mg by mouth every evening.    benazepriL (LOTENSIN) 40 MG tablet Take 40 mg by mouth.    beta-carotene,A,-vits C,E/mins (OCUVITE ORAL) Take 1 tablet by mouth once daily.    buPROPion (WELLBUTRIN XL) 300 MG 24 hr tablet Take 300 mg by mouth once daily.    busPIRone (BUSPAR) 15 MG tablet Take 15 mg by mouth 3 (three) times daily. Take 0.5 tablet (7.5 mg) by mouth twice daily    ergocalciferol (ERGOCALCIFEROL) 50,000 unit Cap Take 50,000 Units by mouth every 7 days. SATURDAYS    hydroCHLOROthiazide (HYDRODIURIL) 12.5 MG Tab Take 12.5 mg by mouth.    levothyroxine (SYNTHROID) 75 MCG tablet Take 75 mcg by mouth once daily.    metoprolol succinate (TOPROL-XL) 25 MG 24 hr tablet Take 1 tablet by mouth every evening.    NEXIUM 40 mg capsule Take 40 mg by mouth every morning.     nitroGLYCERIN (NITROSTAT) 0.4 MG SL tablet Place 0.4 mg under the tongue every 5 (five) minutes as needed for Chest pain.    pramipexole (MIRAPEX) 0.5 MG tablet Take 0.5 mg by mouth every evening.    RESTASIS 0.05 % ophthalmic emulsion Apply 1 drop to eye every 12 (twelve) hours.    rosuvastatin (CRESTOR) 20 MG tablet Take 1 tablet by mouth once daily.    estradioL (ESTRACE) 0.01 % (0.1 mg/gram) vaginal cream Apply 1 fingertipful to vaginal area nightly x 2 weeks then use on MWF (Patient not taking: Reported on 2024)     No current facility-administered medications for this visit.       Review of patient's allergies indicates:   Allergen Reactions    Lyrica [pregabalin] Swelling    Sulfa (sulfonamide antibiotics) Swelling    Toradol [ketorolac] Swelling       Family History   Problem Relation Name Age of Onset    Cancer Father      Cancer Paternal Grandfather      Heart disease Maternal Grandfather         Social History     Socioeconomic History    Marital status:    Tobacco Use    Smoking status: Former     Current packs/day: 0.00     Types: Cigarettes     Start date: 1971     Quit date: 2001     Years since quittin.8   Substance and Sexual Activity    Alcohol use: No    Drug use: No     Social Determinants of Health     Financial Resource Strain: Low Risk  (2024)    Overall Financial Resource Strain (CARDIA)     Difficulty of Paying Living Expenses: Not hard at all   Food Insecurity: No Food Insecurity (2024)    Hunger Vital Sign     Worried About Running Out of Food in the Last Year: Never true     Ran Out of Food in the Last Year: Never true   Transportation Needs: No Transportation Needs (2024)    TRANSPORTATION NEEDS     Transportation : No   Stress: No Stress Concern Present (2024)    [employer] - Occupational Stress Questionnaire     Feeling of Stress : Not at all   Housing Stability: Low Risk  (2024)    Housing Stability  Vital Sign     Unable to Pay for Housing in the Last Year: No     Homeless in the Last Year: No       Prior to meeting with the patient I reviewed the medical chart in [facility]. This included reviewing the previous progress notes from our office, review of the patient's last appointment with their primary care provider, review of any visits to the emergency room, and review of any pain management appointments or procedures.    History of present illness: 73 y.o. female,  presents to clinic today for evaluation of complaints of pain in the right hand.  She has got pain and triggering of the right little finger and then she has got not in the palmar over the 4th ray.       Review of Systems:    Constitutional: Negative for chills, fever and weight loss.   HENT: Negative for congestion.    Eyes: Negative for discharge and redness.   Respiratory: Negative for cough and shortness of breath.    Cardiovascular: Negative for chest pain.   Gastrointestinal: Negative for nausea and vomiting.   Musculoskeletal: See HPI.   Skin: Negative for rash.   Neurological: Negative for headaches.   Endo/Heme/Allergies: Does not bruise/bleed easily.   Psychiatric/Behavioral: The patient is not nervous/anxious.    All other systems reviewed and are negative.       Objective:      Physical Examination:    Vital Signs:  There were no vitals filed for this visit.    Body mass index is 34.5 kg/m².    This a well-developed, well nourished patient in no acute distress.  They are alert and oriented and cooperative to examination.     Examination of the right hand, skin is dry intact, no erythema or ecchymosis, no signs symptoms of infection.  No joint abnormalities or deformities are appreciated.  She has got good range of motion but she has got pain with movement of the ring and little finger.  She has got a nodule just proximal to the A1 pulley of the 4th metacarpal consistent with palmar fibrosis.  Possibly early Dupuytren's contracture but no  prominent contracture is appreciated.  The little finger, she has got a palpable nodularity through the range of motion of the flexor tendon and tenderness over the A1 pulley consistent with a trigger finger.      Pertinent New Results:          XRAY Report / Interpretation:   AP lateral oblique views of the right hand taken today in the office do not demonstrate any fracture or dislocation.  She has got scattered arthritic changes throughout the IP joints.          Assessment:       1. Trigger little finger of right hand    2. Palmar fibromatosis right ring finger      Plan:     Trigger little finger of right hand  -     X-Ray Hand 3 view Right  -     Cancel: Small Joint Aspiration/Injection: R small MCP  -     Tendon Sheath    Palmar fibromatosis right ring finger        Follow up in about 4 weeks (around 10/23/2024) for right 5th finger injection 9/25/24.    Right hand, little finger trigger finger.  We injected the A1 pulley volar aspect lidocaine and triamcinolone sterile technique we will see if this resolves her symptoms.  She will follow up with us in a month.  We did discuss trigger finger release as definitive treatment for this.  She also had a small area of nodularity in the palm with the 4th ring consistent with palmar fibrosis.  Was tender today no triggering in the ring finger however.  We are going to observe this.  If we end up proceeding with trigger finger release consider excision of suspected palmar fibrosis.    The patient and I had a thorough discussion today.  We discussed the working diagnosis as well as several other potential alternative diagnoses.  We discussed treatment options, both conservative and surgical.  Conservative treatment options would include things such as activity modifications, workplace modifications, a period of rest, oral versus topical over the counter and oral versus topical prescription anti-inflammatory medications, physical therapy / occupational therapy,  splinting / bracing, immobilization, corticosteroid injections, and others.        MILTON Callahan, PAJavierC        EMR Statement:  Please note that portions of this patient encounter record were imported from the patients electronic medical record and that others were dictated using voice recognition software. For these reasons grammatical errors, nonsensical language, and apparently contradictory statements may be present.  These should be disregarded or interpreted with respect to the context of the document.

## 2024-10-18 ENCOUNTER — OFFICE VISIT (OUTPATIENT)
Dept: URGENT CARE | Facility: CLINIC | Age: 74
End: 2024-10-18
Payer: MEDICARE

## 2024-10-18 VITALS
SYSTOLIC BLOOD PRESSURE: 138 MMHG | HEIGHT: 64 IN | BODY MASS INDEX: 34.49 KG/M2 | DIASTOLIC BLOOD PRESSURE: 67 MMHG | WEIGHT: 202 LBS | TEMPERATURE: 98 F | RESPIRATION RATE: 18 BRPM | HEART RATE: 67 BPM | OXYGEN SATURATION: 99 %

## 2024-10-18 DIAGNOSIS — R05.9 COUGH, UNSPECIFIED TYPE: ICD-10-CM

## 2024-10-18 DIAGNOSIS — R06.2 WHEEZING: ICD-10-CM

## 2024-10-18 DIAGNOSIS — J06.9 ACUTE URI: Primary | ICD-10-CM

## 2024-10-18 DIAGNOSIS — R06.00 DYSPNEA, UNSPECIFIED TYPE: ICD-10-CM

## 2024-10-18 LAB
CTP QC/QA: YES
CTP QC/QA: YES
FLUAV AG NPH QL: NEGATIVE
FLUBV AG NPH QL: NEGATIVE
SARS-COV-2 AG RESP QL IA.RAPID: NEGATIVE

## 2024-10-18 RX ORDER — METHYLPREDNISOLONE 4 MG/1
TABLET ORAL
Qty: 21 EACH | Refills: 0 | Status: SHIPPED | OUTPATIENT
Start: 2024-10-18 | End: 2024-11-08

## 2024-10-18 RX ORDER — LEVALBUTEROL INHALATION SOLUTION 1.25 MG/3ML
1.25 SOLUTION RESPIRATORY (INHALATION)
Status: COMPLETED | OUTPATIENT
Start: 2024-10-18 | End: 2024-10-18

## 2024-10-18 RX ORDER — BUDESONIDE 0.5 MG/2ML
0.5 INHALANT ORAL DAILY
Qty: 10 ML | Refills: 0 | Status: SHIPPED | OUTPATIENT
Start: 2024-10-18 | End: 2024-10-23

## 2024-10-18 RX ORDER — AMOXICILLIN 500 MG/1
500 TABLET, FILM COATED ORAL EVERY 12 HOURS
Qty: 20 TABLET | Refills: 0 | Status: SHIPPED | OUTPATIENT
Start: 2024-10-18 | End: 2024-10-28

## 2024-10-18 RX ORDER — PROMETHAZINE HYDROCHLORIDE AND DEXTROMETHORPHAN HYDROBROMIDE 6.25; 15 MG/5ML; MG/5ML
5 SYRUP ORAL EVERY 4 HOURS PRN
Qty: 118 ML | Refills: 0 | Status: SHIPPED | OUTPATIENT
Start: 2024-10-18 | End: 2024-10-28

## 2024-10-18 RX ORDER — DEXAMETHASONE SODIUM PHOSPHATE 10 MG/ML
10 INJECTION INTRAMUSCULAR; INTRAVENOUS
Status: COMPLETED | OUTPATIENT
Start: 2024-10-18 | End: 2024-10-18

## 2024-10-18 RX ADMIN — LEVALBUTEROL INHALATION SOLUTION 1.25 MG: 1.25 SOLUTION RESPIRATORY (INHALATION) at 02:10

## 2024-10-18 RX ADMIN — DEXAMETHASONE SODIUM PHOSPHATE 10 MG: 10 INJECTION INTRAMUSCULAR; INTRAVENOUS at 02:10

## 2024-10-18 NOTE — PROGRESS NOTES
"Subjective:      Patient ID: Darshana Alicea is a 73 y.o. female.    Vitals:  height is 5' 4" (1.626 m) and weight is 91.6 kg (202 lb). Her temperature is 98.2 °F (36.8 °C). Her blood pressure is 138/67 and her pulse is 67. Her respiration is 18 and oxygen saturation is 99%.     Chief Complaint: Shortness of Breath    Pt has hx of cat allergies but has been wearing a mask while handling her cats but has not been using it recently and is not sure if it is from that. The onset was sudden within 30 minutes. Pt is experiencing shortness of breath, fatigue, and wheezing.  Patient talking in full sentences.  No acute distress noted.    Shortness of Breath  This is a new problem. The current episode started today. The problem has been rapidly worsening. Associated symptoms include wheezing. Pertinent negatives include no abdominal pain, chest pain, ear pain, fever, headaches, leg swelling, neck pain, rash, sore throat, sputum production or vomiting. Associated symptoms comments: weakness.       Constitution: Negative for activity change, appetite change, chills, fatigue, fever, unexpected weight change and generalized weakness.   HENT:  Positive for congestion. Negative for ear pain, ear discharge, foreign body in ear, tinnitus, hearing loss, dental problem, mouth sores, tongue pain, facial swelling, postnasal drip, sinus pain, sinus pressure, sore throat, trouble swallowing and voice change.    Neck: Negative for neck pain, neck stiffness and painful lymph nodes.   Cardiovascular:  Negative for chest pain, leg swelling, palpitations and sob on exertion.   Eyes:  Negative for eye trauma, eye discharge, eye itching, eye pain, eye redness, vision loss and eyelid swelling.   Respiratory:  Positive for cough, shortness of breath and wheezing. Negative for chest tightness, sputum production, COPD and asthma.    Gastrointestinal:  Negative for abdominal pain, nausea, vomiting, constipation, diarrhea, bright red blood in stool and " dark colored stools.   Endocrine: hair loss, cold intolerance and heat intolerance.   Genitourinary:  Negative for dysuria, frequency, urgency and hematuria.   Musculoskeletal:  Negative for pain, trauma, joint pain, joint swelling, abnormal ROM of joint and muscle ache.   Skin:  Negative for color change, pale, rash, wound and hives.   Allergic/Immunologic: Negative for environmental allergies, seasonal allergies, food allergies, asthma, hives and itching.   Neurological:  Negative for dizziness, history of vertigo, light-headedness, facial drooping, speech difficulty, headaches, disorientation, altered mental status, loss of consciousness and numbness.   Hematologic/Lymphatic: Negative for swollen lymph nodes and easy bruising/bleeding. Does not bruise/bleed easily.   Psychiatric/Behavioral:  Negative for altered mental status, disorientation, confusion, agitation, sleep disturbance and hallucinations.       Objective:     Physical Exam   Constitutional: She is oriented to person, place, and time. She appears well-developed. She is cooperative.   HENT:   Head: Normocephalic and atraumatic.   Ears:   Right Ear: Hearing, tympanic membrane, external ear and ear canal normal.   Left Ear: Hearing, tympanic membrane, external ear and ear canal normal.   Nose: Nose normal. No mucosal edema or nasal deformity. No epistaxis. Right sinus exhibits no maxillary sinus tenderness and no frontal sinus tenderness. Left sinus exhibits no maxillary sinus tenderness and no frontal sinus tenderness.   Mouth/Throat: Uvula is midline, oropharynx is clear and moist and mucous membranes are normal. No trismus in the jaw. Normal dentition. No uvula swelling.   Eyes: Conjunctivae and lids are normal.   Neck: Trachea normal and phonation normal. Neck supple.   Cardiovascular: Normal rate, regular rhythm, normal heart sounds and normal pulses.   Pulmonary/Chest: Effort normal. No stridor. She has decreased breath sounds. She has wheezes  (Expiratory) in the right upper field and the left upper field. She has no rhonchi. She has no rales.   Abdominal: Normal appearance and bowel sounds are normal. Soft.   Musculoskeletal: Normal range of motion.         General: Normal range of motion.      Comments: Kyphosis   Neurological: She is alert and oriented to person, place, and time. She exhibits normal muscle tone.   Skin: Skin is warm, dry and intact.   Psychiatric: Her speech is normal and behavior is normal. Judgment and thought content normal.   Nursing note and vitals reviewed.      Assessment:     1. Acute URI    2. Dyspnea, unspecified type    3. Cough, unspecified type    4. Wheezing        Plan:       Acute URI  -     amoxicillin (AMOXIL) 500 MG Tab; Take 1 tablet (500 mg total) by mouth every 12 (twelve) hours. for 10 days  Dispense: 20 tablet; Refill: 0    Dyspnea, unspecified type  -     XR CHEST PA AND LATERAL; Future; Expected date: 10/18/2024  -     dexAMETHasone injection 10 mg  -     levalbuterol nebulizer solution 1.25 mg  -     budesonide (PULMICORT) 0.5 mg/2 mL nebulizer solution; Take 2 mLs (0.5 mg total) by nebulization once daily. Controller for 5 days  Dispense: 10 mL; Refill: 0    Cough, unspecified type  -     POCT Influenza A/B Rapid Antigen  -     SARS Coronavirus 2 Antigen, POCT Manual Read  -     promethazine-dextromethorphan (PROMETHAZINE-DM) 6.25-15 mg/5 mL Syrp; Take 5 mLs by mouth every 4 (four) hours as needed (Cough).  Dispense: 118 mL; Refill: 0    Wheezing  -     methylPREDNISolone (MEDROL DOSEPACK) 4 mg tablet; use as directed  Dispense: 21 each; Refill: 0  -     dexAMETHasone injection 10 mg  -     levalbuterol nebulizer solution 1.25 mg  -     budesonide (PULMICORT) 0.5 mg/2 mL nebulizer solution; Take 2 mLs (0.5 mg total) by nebulization once daily. Controller for 5 days  Dispense: 10 mL; Refill: 0    Patient in no acute distress at time of discharge, repeat vitals within normal limits, no respiratory distress  noted.  Patient instructed to go to emergency room for further evaluation if symptoms worsened.    Utilize over-the-counter Tylenol or Motrin as directed for fever.    Ensure adequate fluid intake with electrolytes.    Return to clinic for new or worsening symptoms.  Patient is recommended to follow-up with their PCP post discharge.    Total time spent on med rec, H&P, with over half of the time in direct patient care: 40 minutes      DISCLAIMER: Please note that my documentation in this Electronic Healthcare Record was produced using speech recognition software and therefore may contain errors related to that software system.These could include grammar, punctuation and spelling errors or the inclusion/exclusion of phrases that were not intended. Garbled syntax, mangled pronouns, and other bizarre constructions may be attributed to that software system.          Additional MDM:     Heart Failure Score:   COPD = No

## 2024-10-23 ENCOUNTER — OFFICE VISIT (OUTPATIENT)
Dept: ORTHOPEDICS | Facility: CLINIC | Age: 74
End: 2024-10-23
Payer: MEDICARE

## 2024-10-23 ENCOUNTER — HOSPITAL ENCOUNTER (OUTPATIENT)
Dept: RADIOLOGY | Facility: HOSPITAL | Age: 74
Discharge: HOME | End: 2024-10-23
Attending: PHYSICIAN ASSISTANT
Payer: MEDICARE

## 2024-10-23 VITALS — BODY MASS INDEX: 34.48 KG/M2 | HEIGHT: 64 IN | WEIGHT: 201.94 LBS

## 2024-10-23 DIAGNOSIS — M65.942 TENOSYNOVITIS OF LEFT HAND: ICD-10-CM

## 2024-10-23 DIAGNOSIS — M65.351 TRIGGER LITTLE FINGER OF RIGHT HAND: Primary | ICD-10-CM

## 2024-10-23 PROCEDURE — 1125F AMNT PAIN NOTED PAIN PRSNT: CPT | Mod: CPTII,S$GLB,, | Performed by: PHYSICIAN ASSISTANT

## 2024-10-23 PROCEDURE — 73130 X-RAY EXAM OF HAND: CPT | Mod: TC,PN,LT

## 2024-10-23 PROCEDURE — 99999 PR PBB SHADOW E&M-EST. PATIENT-LVL V: CPT | Mod: PBBFAC,,, | Performed by: PHYSICIAN ASSISTANT

## 2024-10-23 PROCEDURE — 1159F MED LIST DOCD IN RCRD: CPT | Mod: CPTII,S$GLB,, | Performed by: PHYSICIAN ASSISTANT

## 2024-10-23 PROCEDURE — 3008F BODY MASS INDEX DOCD: CPT | Mod: CPTII,S$GLB,, | Performed by: PHYSICIAN ASSISTANT

## 2024-10-23 PROCEDURE — 99213 OFFICE O/P EST LOW 20 MIN: CPT | Mod: S$GLB,,, | Performed by: PHYSICIAN ASSISTANT

## 2024-10-23 PROCEDURE — 1160F RVW MEDS BY RX/DR IN RCRD: CPT | Mod: CPTII,S$GLB,, | Performed by: PHYSICIAN ASSISTANT

## 2024-10-23 PROCEDURE — 73130 X-RAY EXAM OF HAND: CPT | Mod: 26,LT,, | Performed by: RADIOLOGY

## 2024-10-23 PROCEDURE — 4010F ACE/ARB THERAPY RXD/TAKEN: CPT | Mod: CPTII,S$GLB,, | Performed by: PHYSICIAN ASSISTANT

## 2024-10-23 PROCEDURE — 3044F HG A1C LEVEL LT 7.0%: CPT | Mod: CPTII,S$GLB,, | Performed by: PHYSICIAN ASSISTANT

## 2024-10-23 NOTE — PROGRESS NOTES
[facility]  [address] [facility] [address]  [place] [address]  Phone: [phone]   [phone]    Patient's PCP: Mitch Marquez MD  Referring Provider: No ref. provider found    Subjective:      Chief Complaint:   Chief Complaint   Patient presents with    Right Hand - Pain     Patient is here for a follow up on right hand 5th finger injection 9.25.24, states pain has improved since last visit. Has constant aching and throbbing as well as off and on locking up        Past Medical History:   Diagnosis Date    Anemia     Colon polyp     Hypothyroidism 12/10/2023    Kidney stone     Kidney stones 10/29/2012    Thyroid disease        Past Surgical History:   Procedure Laterality Date    ANGIOGRAM, CORONARY, WITH LEFT HEART CATHETERIZATION N/A 5/14/2024    Procedure: Angiogram, Coronary, with Left Heart Cath;  Surgeon: Caro Baeza MD;  Location: [facility] CATH/EP LAB;  Service: Cardiology;  Laterality: N/A;    BLADDER SURGERY      CHOLECYSTECTOMY      CYSTOSCOPY      HYSTERECTOMY      OOPHORECTOMY      tonsillectomy   1964       Current Outpatient Medications   Medication Sig    amLODIPine (NORVASC) 5 MG tablet Take 5 mg by mouth every evening.    amoxicillin (AMOXIL) 500 MG Tab Take 1 tablet (500 mg total) by mouth every 12 (twelve) hours. for 10 days    benazepriL (LOTENSIN) 40 MG tablet Take 40 mg by mouth.    beta-carotene,A,-vits C,E/mins (OCUVITE ORAL) Take 1 tablet by mouth once daily.    budesonide (PULMICORT) 0.5 mg/2 mL nebulizer solution Take 2 mLs (0.5 mg total) by nebulization once daily. Controller for 5 days    buPROPion (WELLBUTRIN XL) 300 MG 24 hr tablet Take 300 mg by mouth once daily.    busPIRone (BUSPAR) 15 MG tablet Take 15 mg by mouth 3 (three) times daily. Take 0.5 tablet (7.5 mg) by mouth twice daily    ergocalciferol (ERGOCALCIFEROL) 50,000 unit Cap Take 50,000 Units by mouth every 7 days. SATURDAYS    hydroCHLOROthiazide (HYDRODIURIL) 12.5 MG Tab Take 12.5 mg by mouth.     levothyroxine (SYNTHROID) 75 MCG tablet Take 75 mcg by mouth once daily.    methylPREDNISolone (MEDROL DOSEPACK) 4 mg tablet use as directed    metoprolol succinate (TOPROL-XL) 25 MG 24 hr tablet Take 1 tablet by mouth every evening.    NEXIUM 40 mg capsule Take 40 mg by mouth every morning.    nitroGLYCERIN (NITROSTAT) 0.4 MG SL tablet Place 0.4 mg under the tongue every 5 (five) minutes as needed for Chest pain.    pramipexole (MIRAPEX) 0.5 MG tablet Take 0.5 mg by mouth every evening.    promethazine-dextromethorphan (PROMETHAZINE-DM) 6.25-15 mg/5 mL Syrp Take 5 mLs by mouth every 4 (four) hours as needed (Cough).    RESTASIS 0.05 % ophthalmic emulsion Apply 1 drop to eye every 12 (twelve) hours.    rosuvastatin (CRESTOR) 20 MG tablet Take 1 tablet by mouth once daily.    estradioL (ESTRACE) 0.01 % (0.1 mg/gram) vaginal cream Apply 1 fingertipful to vaginal area nightly x 2 weeks then use on MWF (Patient not taking: Reported on 2024)     No current facility-administered medications for this visit.       Review of patient's allergies indicates:   Allergen Reactions    Lyrica [pregabalin] Swelling    Sulfa (sulfonamide antibiotics) Swelling    Toradol [ketorolac] Swelling       Family History   Problem Relation Name Age of Onset    Cancer Father      Cancer Paternal Grandfather      Heart disease Maternal Grandfather         Social History     Socioeconomic History    Marital status:    Tobacco Use    Smoking status: Former     Current packs/day: 0.00     Types: Cigarettes     Start date: 1971     Quit date: 2001     Years since quittin.9   Substance and Sexual Activity    Alcohol use: No    Drug use: No     Social Drivers of Health     Financial Resource Strain: Low Risk  (2024)    Overall Financial Resource Strain (CARDIA)     Difficulty of Paying Living Expenses: Not hard at all   Food Insecurity: No Food Insecurity (2024)    Hunger Vital Sign     Worried About Running Out  of Food in the Last Year: Never true     Ran Out of Food in the Last Year: Never true   Transportation Needs: No Transportation Needs (5/11/2024)    TRANSPORTATION NEEDS     Transportation : No   Stress: No Stress Concern Present (5/11/2024)    [place] [employer] - Occupational Stress Questionnaire     Feeling of Stress : Not at all   Housing Stability: Low Risk  (5/11/2024)    Housing Stability Vital Sign     Unable to Pay for Housing in the Last Year: No     Homeless in the Last Year: No       Prior to meeting with the patient I reviewed the medical chart in [facility]. This included reviewing the previous progress notes from our office, review of the patient's last appointment with their primary care provider, review of any visits to the emergency room, and review of any pain management appointments or procedures.    History of present illness: 74 y.o. female,  returns to clinic today to follow-up on the right hand, she has a right little finger trigger finger we have injected the A1 pulley about a month ago.  Pain persists.  She has got pain over the volar aspect of the right hand at the little finger with painful range of motion.    Today however she complains of pain in the left little finger as well.  She states that she was scratched by her cat back in August.  She did not seek medical treatment or evaluation.  She has got pain and swelling over the proximal IP joint.  No altered sensation.  Just tender to touch and painful range of motion.       Review of Systems:    Constitutional: Negative for chills, fever and weight loss.   HENT: Negative for congestion.    Eyes: Negative for discharge and redness.   Respiratory: Negative for cough and shortness of breath.    Cardiovascular: Negative for chest pain.   Gastrointestinal: Negative for nausea and vomiting.   Musculoskeletal: See HPI.   Skin: Negative for rash.   Neurological: Negative for headaches.   Endo/Heme/Allergies: Does not  bruise/bleed easily.   Psychiatric/Behavioral: The patient is not nervous/anxious.    All other systems reviewed and are negative.       Objective:      Physical Examination:    Vital Signs:  There were no vitals filed for this visit.    Body mass index is 34.66 kg/m².    This a well-developed, well nourished patient in no acute distress.  They are alert and oriented and cooperative to examination.     Examination of the right hand, she has palpable nodularity over the 4th metacarpal volar aspect of the right hand consistent with palmar fibrosis or early Dupuytren's contracture.  She has got good extension without pain in the right ring finger.  The right little finger she remains tender at the A1 pulley.  She has got painful range motion passively with the right little finger but no nodularity in the tendon or tendon sheath through the range of motion.  I can not reproduce any palpable clicking or locking but subjectively she continues to complain of pain.    Examination of the left hand, the left little finger, volar and ulnar deviated at the proximal IP joint the patient has a reported healed laceration from a CAT scratch.  It is tender, exquisitely tender to the touch.  She has got some slight flexion that she holds the finger in and pain with passive range of motion.  But no circumferential swelling.      Pertinent New Results:          XRAY Report / Interpretation:   AP lateral oblique views of the left hand taken today in the office do not demonstrate any acute osseous abnormalities, radiopaque foreign bodies or abnormal soft tissues.          Assessment:       1. Trigger little finger of right hand    2. Tenosynovitis of left hand      Plan:     Trigger little finger of right hand    Tenosynovitis of left hand  -     MRI Hand Fingers Without Contrast Left; Future; Expected date: 10/23/2024  -     X-Ray Hand 3 view Left        No follow-ups on file.    Right hand, little finger, trigger finger.  Unresponsive  to injection.  We discussed trigger finger release.  She also has some palpable palmar fibrosis over the 4th metacarpal of the right hand.  Today however, she had a new complaint.  She had a left little finger cat scratch about 2-1/2 months ago.  She treated this conservatively at home but has continued to have pain and decreased range of motion or painful range of motion of the proximal IP joint of the left little finger.  The fingers not circumferentially swollen but she does have pain which is not significantly out of proportion to the exam.  She does hold the finger slightly passively flexed, but it is not circumferentially swollen.  Neurovascularly intact I do not suspected digital nerve injury.  Considerations would be for a latent infection, cat scratch disease or tenosynovitis.  X-rays show no evidence of foreign body.  We are going to MRI of the left hand, little finger and send her to Dr. Pranav Norman a dedicated hand surgeon for evaluation.    She will follow up with us after consultation with a hand surgeon.    The patient and I had a thorough discussion today.  We discussed the working diagnosis as well as several other potential alternative diagnoses.  We discussed treatment options, both conservative and surgical.  Conservative treatment options would include things such as activity modifications, workplace modifications, a period of rest, oral versus topical over the counter and oral versus topical prescription anti-inflammatory medications, physical therapy / occupational therapy, splinting / bracing, immobilization, corticosteroid injections, and others.      [unfilled]  MILTON Callahan PA-C        EMR Statement:  Please note that portions of this patient encounter record were imported from the patients electronic medical record and that others were dictated using voice recognition software. For these reasons grammatical errors, nonsensical language, and apparently contradictory  statements may be present.  These should be disregarded or interpreted with respect to the context of the document.

## 2024-10-29 ENCOUNTER — TELEPHONE (OUTPATIENT)
Dept: ORTHOPEDICS | Facility: CLINIC | Age: 74
End: 2024-10-29
Payer: MEDICARE

## 2024-10-30 ENCOUNTER — OFFICE VISIT (OUTPATIENT)
Dept: UROGYNECOLOGY | Facility: CLINIC | Age: 74
End: 2024-10-30
Payer: MEDICARE

## 2024-10-30 VITALS
HEIGHT: 64 IN | HEART RATE: 58 BPM | SYSTOLIC BLOOD PRESSURE: 163 MMHG | WEIGHT: 210.75 LBS | BODY MASS INDEX: 35.98 KG/M2 | DIASTOLIC BLOOD PRESSURE: 63 MMHG

## 2024-10-30 DIAGNOSIS — R39.15 URINARY URGENCY: ICD-10-CM

## 2024-10-30 DIAGNOSIS — R33.9 INCOMPLETE BLADDER EMPTYING: ICD-10-CM

## 2024-10-30 DIAGNOSIS — N39.41 URGE INCONTINENCE OF URINE: ICD-10-CM

## 2024-10-30 DIAGNOSIS — N39.3 STRESS INCONTINENCE: ICD-10-CM

## 2024-10-30 DIAGNOSIS — N81.10 PROLAPSE OF ANTERIOR VAGINAL WALL: Primary | ICD-10-CM

## 2024-10-30 LAB
BILIRUB SERPL-MCNC: NORMAL MG/DL
BLOOD URINE, POC: NORMAL
CLARITY, POC UA: CLEAR
COLOR, POC UA: YELLOW
GLUCOSE UR QL STRIP: NORMAL
KETONES UR QL STRIP: NORMAL
LEUKOCYTE ESTERASE URINE, POC: NORMAL
NITRITE, POC UA: NORMAL
PH, POC UA: 5
POC RESIDUAL URINE VOLUME: 35 ML (ref 0–100)
PROTEIN, POC: NORMAL
SPECIFIC GRAVITY, POC UA: 1.02
UROBILINOGEN, POC UA: NORMAL

## 2024-10-30 PROCEDURE — 99999 PR PBB SHADOW E&M-EST. PATIENT-LVL IV: CPT | Mod: PBBFAC,,, | Performed by: OBSTETRICS & GYNECOLOGY

## 2024-11-04 ENCOUNTER — OFFICE VISIT (OUTPATIENT)
Dept: ORTHOPEDICS | Facility: CLINIC | Age: 74
End: 2024-11-04
Payer: MEDICARE

## 2024-11-04 DIAGNOSIS — M65.942 TENOSYNOVITIS OF LEFT HAND: ICD-10-CM

## 2024-11-04 PROCEDURE — 99999 PR PBB SHADOW E&M-EST. PATIENT-LVL III: CPT | Mod: PBBFAC,,, | Performed by: ORTHOPAEDIC SURGERY

## 2024-11-04 PROCEDURE — 1125F AMNT PAIN NOTED PAIN PRSNT: CPT | Mod: CPTII,S$GLB,, | Performed by: ORTHOPAEDIC SURGERY

## 2024-11-04 PROCEDURE — 1159F MED LIST DOCD IN RCRD: CPT | Mod: CPTII,S$GLB,, | Performed by: ORTHOPAEDIC SURGERY

## 2024-11-04 PROCEDURE — 1101F PT FALLS ASSESS-DOCD LE1/YR: CPT | Mod: CPTII,S$GLB,, | Performed by: ORTHOPAEDIC SURGERY

## 2024-11-04 PROCEDURE — 99203 OFFICE O/P NEW LOW 30 MIN: CPT | Mod: S$GLB,,, | Performed by: ORTHOPAEDIC SURGERY

## 2024-11-04 PROCEDURE — 4010F ACE/ARB THERAPY RXD/TAKEN: CPT | Mod: CPTII,S$GLB,, | Performed by: ORTHOPAEDIC SURGERY

## 2024-11-04 PROCEDURE — 3288F FALL RISK ASSESSMENT DOCD: CPT | Mod: CPTII,S$GLB,, | Performed by: ORTHOPAEDIC SURGERY

## 2024-11-04 PROCEDURE — 3044F HG A1C LEVEL LT 7.0%: CPT | Mod: CPTII,S$GLB,, | Performed by: ORTHOPAEDIC SURGERY

## 2024-11-21 ENCOUNTER — TELEPHONE (OUTPATIENT)
Dept: PULMONOLOGY | Facility: CLINIC | Age: 74
End: 2024-11-21
Payer: MEDICARE

## 2024-11-21 NOTE — TELEPHONE ENCOUNTER
----- Message from Denisa sent at 11/21/2024 12:55 PM CST -----  Contact: [phone]  Patient needs first available appointment due to sleep apnea. Pt requested to be scheduled with provider. Provider name did not come up in the decision tree when attempting to schedule. Please call patient at [phone] . Thanks KB

## 2024-11-22 ENCOUNTER — PROCEDURE VISIT (OUTPATIENT)
Dept: UROGYNECOLOGY | Facility: CLINIC | Age: 74
End: 2024-11-22
Payer: MEDICARE

## 2024-11-22 VITALS
SYSTOLIC BLOOD PRESSURE: 160 MMHG | BODY MASS INDEX: 34.78 KG/M2 | WEIGHT: 208.75 LBS | DIASTOLIC BLOOD PRESSURE: 72 MMHG | HEIGHT: 65 IN

## 2024-11-22 DIAGNOSIS — N39.41 URGE INCONTINENCE OF URINE: ICD-10-CM

## 2024-11-22 DIAGNOSIS — N39.3 STRESS INCONTINENCE: ICD-10-CM

## 2024-11-22 DIAGNOSIS — N81.10 PROLAPSE OF ANTERIOR VAGINAL WALL: Primary | ICD-10-CM

## 2024-11-22 LAB
BILIRUB SERPL-MCNC: NORMAL MG/DL
BLOOD URINE, POC: NORMAL
CLARITY, POC UA: CLEAR
COLOR, POC UA: NORMAL
GLUCOSE UR QL STRIP: NORMAL
KETONES UR QL STRIP: NORMAL
LEUKOCYTE ESTERASE URINE, POC: NORMAL
NITRITE, POC UA: NORMAL
PH, POC UA: 5
PROTEIN, POC: NORMAL
SPECIFIC GRAVITY, POC UA: 1.02
UROBILINOGEN, POC UA: NORMAL

## 2024-11-22 RX ORDER — CIPROFLOXACIN 500 MG/1
500 TABLET ORAL
Status: COMPLETED | OUTPATIENT
Start: 2024-11-22 | End: 2024-11-22

## 2024-11-22 RX ADMIN — CIPROFLOXACIN 500 MG: 500 TABLET ORAL at 11:11

## 2024-11-22 NOTE — PROCEDURES
Simple Urodynamics    Date/Time: 11/22/2024 10:00 AM    Performed by: Harmony Doyle MD  Authorized by: Harmony Doyle MD  Preparation: Patient was prepped and draped in the usual sterile fashion.  Local anesthesia used: no    Anesthesia:  Local anesthesia used: no    Sedation:  Patient sedated: no    Patient tolerance: patient tolerated the procedure well with no immediate complications        TITLE OF OPERATION:  Complex cystometry.  Complex uroflowmetry.  Electromyography with surface electrodes.  Pressure voiding flow study.  Abdominal pressure measurement.  Leak point pressure measurement.      SURGEONS NARRATIVE:  A time out was performed in which the patient identity and procedure were confirmed.  Urodynamic evaluation was performed using a computerized system.  Uroflowmetry was performed on the patient in the sitting position without catheters in place.  Subsequent urodynamic testing was performed with the patient in the lithotomy position at 45 degrees. Water charged catheters were used with sterile water as the infusion medium. Vesical and abdominal (rectal) pressures were measured, and detrusor pressure was calculated. EMG activity was recorded with surface electrodes. During filling, room temperature sterile water was infused at a rate of 30 cubic centimeters per minute. The patient was asked cough after instillation of each 100cc volume. Two Valsalva leak point pressures and two cough leak point pressures were performed with the catheters in place at 300 cubic centimeters and again at maximum capacity. Valsalva leak point pressure was defined as the difference between vesical pressure at which leakage was noted (visualized at the external urethral meatus) and the baseline vesical pressure. Following urodynamic testing, a pressure flow study was performed with the patient in the sitting position. Vesical and abdominal pressures were monitored and detrusor pressures were calculated. After the pressure  flow study, the catheters were then removed. The patient tolerated the procedure well.       1.  VOIDING PHASE:      a.  Uroflowmetry:  Prolapse reduction: No  Voided volume:  65 mL   Voiding time:   22 seconds  Max flow:  5.6 mL/s  Avg flow:   3.3 mL/s   PVR:   20 mL    The overall configuration of this uroflow study was  intermittent.      b.  Pressure flow:  Prolapse reduction: No  Voided volume:   265 mL  Voiding time:   1:35 min: seconds  Peak flow:  7 mL/s   Avg flow:  3.2 mL/s  Max det pressure:  46  cm H20  Det pressure at max flow: 33 cm H20  Void initiated by  valsalva with detrusor contraction.    Urethral relaxation (EMG):  yes.    PVR (calculated):  53 mL    The overall configuration of this pressure flow study was prolonged interrupted.      2.  FILLING PHASE:  1st sensation: 61 mL  1st desire:  119 mL  Strong desire:  213 mL  Urgency/ Capacity:  318 mL  Compliance (calculated)  40 mL/cm H20  EMG activity during filling:   increased  Detrusor contractions observed: No.      3.  URETHRAL FUNCTION/STORAGE PHASE:    a.  WITH prolapse reduction:  CLPP (165 mL): Negative  at  240 cm H20  VLPP (165 mL): Negative  at  172 cm H20   CLPP (MAX ):    Positive  at  153 cm H20  VLPP (MAX):     Negative  at  181 cm H20    These findings are consistent with Positive urodynamic stress incontinence.    Assessment:  UF  is intermittent.  PF is prolonged interrupted.  Compliance appears normal.  Max capacity is normal at 318 mL.  DO (--).  MAYE (+) with cough at 300 mL.      Plan:  Prolapse of anterior vaginal wall  -     Simple Urodynamics  -     POCT URINE DIPSTICK WITHOUT MICROSCOPE  -     ciprofloxacin HCl tablet 500 mg    Stress incontinence  -     Simple Urodynamics  -     POCT URINE DIPSTICK WITHOUT MICROSCOPE  -     ciprofloxacin HCl tablet 500 mg      75 yo with stage 2 anterior vaginal prolapse and mixed urinary incontinence who underwent urodynamic testing. Testing demonstrates an obstructive voiding pattern.  Her sensations appear normal and her bladder capacity is normal. She was noted to have leakage with cough at 300 mL. Planning for an anterior repair. Has information about Bulkamid and Midurethral slings. Will review risks and benefits of each at her follow up visit.     Harmony Doyle MD, MPH  Division of Urogynecology  [address] [place] [address], [address]  [place], [place] [id number] [phone]

## 2024-11-27 ENCOUNTER — OFFICE VISIT (OUTPATIENT)
Dept: UROGYNECOLOGY | Facility: CLINIC | Age: 74
End: 2024-11-27
Payer: MEDICARE

## 2024-11-27 VITALS
WEIGHT: 208.75 LBS | SYSTOLIC BLOOD PRESSURE: 168 MMHG | HEIGHT: 65 IN | HEART RATE: 55 BPM | BODY MASS INDEX: 34.78 KG/M2 | DIASTOLIC BLOOD PRESSURE: 73 MMHG

## 2024-11-27 DIAGNOSIS — N81.10 PROLAPSE OF ANTERIOR VAGINAL WALL: Primary | ICD-10-CM

## 2024-11-27 DIAGNOSIS — N39.3 STRESS INCONTINENCE: ICD-10-CM

## 2024-11-27 DIAGNOSIS — R35.1 NOCTURIA: ICD-10-CM

## 2024-11-27 PROCEDURE — 3044F HG A1C LEVEL LT 7.0%: CPT | Mod: CPTII,S$GLB,, | Performed by: OBSTETRICS & GYNECOLOGY

## 2024-11-27 PROCEDURE — 3078F DIAST BP <80 MM HG: CPT | Mod: CPTII,S$GLB,, | Performed by: OBSTETRICS & GYNECOLOGY

## 2024-11-27 PROCEDURE — 99999 PR PBB SHADOW E&M-EST. PATIENT-LVL V: CPT | Mod: PBBFAC,,, | Performed by: OBSTETRICS & GYNECOLOGY

## 2024-11-27 PROCEDURE — 3008F BODY MASS INDEX DOCD: CPT | Mod: CPTII,S$GLB,, | Performed by: OBSTETRICS & GYNECOLOGY

## 2024-11-27 PROCEDURE — 1101F PT FALLS ASSESS-DOCD LE1/YR: CPT | Mod: CPTII,S$GLB,, | Performed by: OBSTETRICS & GYNECOLOGY

## 2024-11-27 PROCEDURE — 3288F FALL RISK ASSESSMENT DOCD: CPT | Mod: CPTII,S$GLB,, | Performed by: OBSTETRICS & GYNECOLOGY

## 2024-11-27 PROCEDURE — 3077F SYST BP >= 140 MM HG: CPT | Mod: CPTII,S$GLB,, | Performed by: OBSTETRICS & GYNECOLOGY

## 2024-11-27 PROCEDURE — 1160F RVW MEDS BY RX/DR IN RCRD: CPT | Mod: CPTII,S$GLB,, | Performed by: OBSTETRICS & GYNECOLOGY

## 2024-11-27 PROCEDURE — 4010F ACE/ARB THERAPY RXD/TAKEN: CPT | Mod: CPTII,S$GLB,, | Performed by: OBSTETRICS & GYNECOLOGY

## 2024-11-27 PROCEDURE — 99214 OFFICE O/P EST MOD 30 MIN: CPT | Mod: S$GLB,,, | Performed by: OBSTETRICS & GYNECOLOGY

## 2024-11-27 PROCEDURE — 1126F AMNT PAIN NOTED NONE PRSNT: CPT | Mod: CPTII,S$GLB,, | Performed by: OBSTETRICS & GYNECOLOGY

## 2024-11-27 PROCEDURE — 1159F MED LIST DOCD IN RCRD: CPT | Mod: CPTII,S$GLB,, | Performed by: OBSTETRICS & GYNECOLOGY

## 2024-11-27 NOTE — PROGRESS NOTES
Chief Complaint   Patient presents with    Follow up        HPI: Patient is a 74 y.o. female  with stage 2 anterior vaginal prolapse presents to review her urodynamic testing results and plan for surgery.     Attempted a #2 and #3 ring with support pessary but found both to be painful. Tried Solifenicin/ Vesicare and Detrol for nocturia but once she started to use a BIPAP machine her nocturia improved.   Patient has not has experienced any worsening of symptoms since her last visit.     REVIEW OF SYSTEMS:  A full 14-point ROS was performed and was significant for those  mentioned in the HPI.    The following portions of the patient's history were reviewed and updated as appropriate: allergies, current medications, past medical history, past surgical history and problem list.    PHYSICAL EXAMINATION    Vitals:    24 0928   BP: (!) 168/73   Pulse: (!) 55        General: Healthy in appearance, Well nourished, Affect Normal, NAD.    No visits with results within 1 Day(s) from this visit.   Latest known visit with results is:   Procedure visit on 2024   Component Date Value Ref Range Status    Glucose, UA 2024 normal   Final    Bilirubin, POC 2024 neg   Final    Ketones, UA 2024 neg   Final    Spec Grav UA 2024 1.020   Final    Blood, UA 2024 trace   Final    pH, UA 2024 5   Final    Protein, POC 2024 neg   Final    Urobilinogen, UA 2024 normal   Final    Nitrite, UA 2024 neg   Final    WBC, UA 2024 neg   Final    Color, UA 2024 Sara   Final    Clarity, UA 2024 Clear   Final        ASSESSMENT & PLAN:    Prolapse of anterior vaginal wall  -     Case Request Operating Room: COLPORRHAPHY, ANTERIOR, CYSTOSCOPY, WITH PERIURETHRAL BULKING AGENT INJECTION    Stress incontinence  -     Case Request Operating Room: COLPORRHAPHY, ANTERIOR, CYSTOSCOPY, WITH PERIURETHRAL BULKING AGENT INJECTION    Nocturia         73 yo with stage 2 anterior  vaginal prolapse and mixed urinary incontinence who underwent urodynamic testing. Discussed that testing demonstrates an obstructive voiding pattern. Her sensations appear normal and her bladder capacity is normal. She was noted to have leakage with cough at 300 mL. Reviewed the pros and cons as well as success rates with both Bulkamid and Midurethral sling. Patient would like to proceed with an anterior repair and Bulkamid injections.     We reviewed the risks and benefits of the planned surgical procedure which included but were not limited to the potential conversion to an open or vaginal procedure, pelvic pain, pain with intercourse, infection, bleeding, need for transfusion, risks of transfusion, injury to bowel, urinary tract, blood vessels or nerves, urinary retention, prolonged catheterization, mesh erosion or infection, no change in symptoms, new onset MAYE or UUI, recurrence of prolapse, change in urine stream, recurrent UTI's, fistula formation, need for ostomy (colostomy/ileostomy/ nephrostomy/ ileostomy) and need for further surgery. Bulkamid risks were reveiwed including urethral pain, bleeding or blood in the urine, rupture of the injections sites with excessive coughing resulting in recurrent symptoms of stress urinary incontinence, urinary retention, change in urine stream, urinary tract infection, hematoma, injury to urethra or bladder, need for additional injections of Bulkamid.   Patient was provided the opportunity to ask questions, All questions were answered. The informed consent was signed and a copy was provided to the patient.     All questions were answered today. The patient was encouraged to contact the office as needed with any additional questions or concerns.     Total time spent on visit was 30 minutes.  This includes face to face time and non-face to face time preparing to see the patient (eg, review of tests), Obtaining and/or reviewing separately obtained history, Documenting  clinical information in the electronic or other health record, Independently interpreting resultsand communicating results to the patient/family/caregiver, or Care coordination.    Harmony Doyle MD

## 2024-12-03 ENCOUNTER — TELEPHONE (OUTPATIENT)
Dept: UROGYNECOLOGY | Facility: CLINIC | Age: 74
End: 2024-12-03
Payer: MEDICARE

## 2024-12-03 NOTE — TELEPHONE ENCOUNTER
Contacted patient as she requested a call back. Patient was not able to schedule a visit with her PCP until 1/16/25. Her surgery is on 1/28/25. Reviewed that as long as her lab testing is ok then the timing would be ok however if there is any abnormality and she is not able to clear her for surgery then the surgery may need to be postponed. Patient will try to get an earlier appointment if possible. She plans to reach out again to her PCP.     Harmony Doyle MD

## 2024-12-10 ENCOUNTER — PATIENT MESSAGE (OUTPATIENT)
Dept: UROLOGY | Facility: CLINIC | Age: 74
End: 2024-12-10
Payer: MEDICARE

## 2024-12-10 ENCOUNTER — TELEPHONE (OUTPATIENT)
Dept: UROLOGY | Facility: CLINIC | Age: 74
End: 2024-12-10
Payer: MEDICARE

## 2024-12-10 NOTE — TELEPHONE ENCOUNTER
Call placed with no answer. VM left for patient explaining we need to schedule BHANU/KUB before scheduled f/u appointment on Friday

## 2024-12-11 ENCOUNTER — TELEPHONE (OUTPATIENT)
Dept: UROGYNECOLOGY | Facility: CLINIC | Age: 74
End: 2024-12-11
Payer: MEDICARE

## 2024-12-11 NOTE — TELEPHONE ENCOUNTER
Office called stating they need form to clear patient for surgery. Informed will notify surgery scheduler. Call ended    ----- Message from Rajiv sent at 12/11/2024 11:09 AM CST -----  Regarding: return call  Contact: MD NISA Marquez office/ [place]  Type:  Patient Returning Call    Who Called:MD Dick Godinez office/ [place]  Who Left Message for Patient:staff  Does the patient know what this is regarding?:surgery need patient in office today for pre-op/ questions  Would the patient rather a call back or a response via [facility]? fax  Best Call Back Number:[phone]  Additional Information: fax# [phone]

## 2024-12-13 ENCOUNTER — OFFICE VISIT (OUTPATIENT)
Dept: UROLOGY | Facility: CLINIC | Age: 74
End: 2024-12-13
Payer: MEDICARE

## 2024-12-13 VITALS — HEIGHT: 65 IN | WEIGHT: 208.75 LBS | BODY MASS INDEX: 34.78 KG/M2

## 2024-12-13 DIAGNOSIS — R35.1 NOCTURIA: ICD-10-CM

## 2024-12-13 DIAGNOSIS — N20.0 KIDNEY STONES: Primary | ICD-10-CM

## 2024-12-13 LAB
BACTERIA #/AREA URNS AUTO: NORMAL /HPF
BILIRUBIN, UA POC OHS: NEGATIVE
BLOOD, UA POC OHS: ABNORMAL
CLARITY, UA POC OHS: CLEAR
COLOR, UA POC OHS: YELLOW
GLUCOSE, UA POC OHS: NEGATIVE
KETONES, UA POC OHS: NEGATIVE
LEUKOCYTES, UA POC OHS: ABNORMAL
MICROSCOPIC COMMENT: NORMAL
NITRITE, UA POC OHS: NEGATIVE
PH, UA POC OHS: 5.5
PROTEIN, UA POC OHS: NEGATIVE
RBC #/AREA URNS AUTO: 2 /HPF (ref 0–4)
SPECIFIC GRAVITY, UA POC OHS: 1.02
SQUAMOUS #/AREA URNS AUTO: 1 /HPF
UROBILINOGEN, UA POC OHS: 0.2
WBC #/AREA URNS AUTO: 2 /HPF (ref 0–5)

## 2024-12-13 PROCEDURE — 81001 URINALYSIS AUTO W/SCOPE: CPT | Performed by: NURSE PRACTITIONER

## 2024-12-13 PROCEDURE — 87086 URINE CULTURE/COLONY COUNT: CPT | Performed by: NURSE PRACTITIONER

## 2024-12-13 PROCEDURE — 99999 PR PBB SHADOW E&M-EST. PATIENT-LVL IV: CPT | Mod: PBBFAC,,, | Performed by: NURSE PRACTITIONER

## 2024-12-13 NOTE — PROGRESS NOTES
[facility]  Group: Felix/Avelina/Kisha/Timmy  NPs: Dilcia Ibry/Jessica Contreras    Note today written by:Jessica Contreras  Date of Service: 12/13/2024      CHIEF COMPLAINT: F/u kidney stones    PRESENTING ILLNESS:    Darshana Alicea is a 74 y.o. female who presents for f/u kidney stones. Last clinic visit was 6/12/24 12/13/24  Pt presents to clinic for f/u kidney stones  Had BAHNU and KUB completed at [facility] 12/6/24, uploaded to media  KUB resulted no renal stone  BHANU: initially BHANU resulted no hydro, stones or mass. + benign complicated renal cyst and right renal non-obstructing calcification wo change  No issues regarding kidney stones since last visit  Denies dysuria, gross hematuria, flank pain, fever, chills, nausea or vomiting    Pt is following urogyn for prolapse.  11/22/24 UA tr blood. No micro completed  10/30/24 UA neg blood, leuk and nitrite  5/28/24 micro UA RBC 2    6/12/24  Had sleep study. + LORENZO, now wearing bi-pap  Stopped vesicare when started to wear bi-pap   Nocturia improved. Now only having nocturia x 1-2. Not bothersome  Denies daytime frequency. Occasional urgency if holds too long. Denies UUI  PVR 1 ml    Pt was seen in ED 5/28/24 for right back pain radiating to RLQ, felt she had kidney stone.  No ureteral stone seen on CT. Treated for MS pain and constipation.  She was given keflex for UTI. UA +1 leuk. Micro UA RBC 2, WBC 3. NO CULTURE.  Creatinine 1.1 / GFR 53.4  5/28/24 CT RSS:  The kidneys are normal in size and location. There is a 0.3 cm nonobstructing right lower pole renal calculus. There is no significant hydronephrosis. No definite obstructing ureteral calculus identified. The urinary bladder demonstrates no significant abnormality.     Decreased coke intake   Drinking more lemonade and sprite    3/13/24  On vesicare 10 mg  PVR 83 ml  Pt reports nocturia improved to 2-3 x per night but still bothersome  Had sleep study 3/6/24  and has follow up next month with MD to review  She only voids 3 x per day. + Urgency. Denies UUI  Denies dysuria, gross hematuria, flank pain, fever, chills, nausea or vomiting  Constipation controlled with colace      11/15/23   She has a remote hx of stones.   She has seen Dr. Cárdenas in August 2022 for microscopic hematuria and underwent cysto/RGP which was normal per op note. CT scan also normal.   Was being considered for an Interstim. Did a 3 day voiding study but never heard back with results.   No gross hematuria.      She was on Detrol 2 mg BID. This stopped working and now she is on Vesicare 10 mg, just started this.      Today complaining of nocturia x 3-5. She does snore at night. Had a sleep study > 10 years ago which was reportedly normal.   During the day has some frequency but she is on a diuretic which she takes in the morning.    No incontinence.   She does have some urgency.      She does have issues with constipation. Started taking stool softener and now is having daily bowel movements.   Drinks 3 bottles of water and 1 coke.      Hx of stones treated with ESWL by Dr. Gomez.      UA today: negative for blood, leuks, nitrite   PVR today: 20 cc     Last urine culture: no documented UTIs     Family  hx: no  malignancy   Hx of CKD       REVIEW OF SYSTEMS: as stated above in hpi    PATIENT HISTORY:  Past Medical History:   Diagnosis Date    Acid reflux     Anemia     Anxiety     Colon polyp     High cholesterol     Hypertension     Hypothyroidism 12/10/2023    Kidney stone     Kidney stones 10/29/2012    Thyroid disease        Past Surgical History:   Procedure Laterality Date    ANGIOGRAM, CORONARY, WITH LEFT HEART CATHETERIZATION N/A 05/14/2024    Procedure: Angiogram, Coronary, with Left Heart Cath;  Surgeon: Caro Baeza MD;  Location: [facility] CATH/EP LAB;  Service: Cardiology;  Laterality: N/A;    BLADDER SURGERY  2014    h/o prolapse; suspension of bladder, transvaginal     CHOLECYSTECTOMY      CYSTOSCOPY      HYSTERECTOMY      OOPHORECTOMY      SPINAL FUSION      x 2 , neck    tonsillectomy   01/01/1964    ULNAR NERVE REPAIR         Allergies:  Lyrica [pregabalin], Sulfa (sulfonamide antibiotics), and Toradol [ketorolac]      PHYSICAL EXAMINATION:  Constitutional: She is oriented to person, place, and time. She appears well-developed and well-nourished.  She is in no apparent distress.  Abdominal:  She exhibits no distension.  There is no CVA tenderness.   Neurological: She is alert and oriented to person, place, and time.   Psych: Cooperative with normal affect.      Physical Exam    LABS:      Lab Results   Component Value Date    CREATININE 0.97 07/15/2024     Lab Results   Component Value Date    HGBA1C 5.1 05/10/2024       IMPRESSION:    Encounter Diagnoses   Name Primary?    Kidney stones Yes    Nocturia          PLAN:  -Reviewed BHANU and KUB results with pt. Will continue to monitor benign cyst and stone.  CT RSS in 6 months. Pt prefers to have done at [facility]. Will call closer to follow up appt to order with [facility]    -Good water intake for proper hydration    -No longer having nocturia since LORENZO was treated    -Urine sent for micro UA and culture. Will call with results    -RTC 6 months with CT prior    I encouraged her or any of her family members to call or email me with questions and/or concerns.    30 minutes of total time spent on the encounter, which includes face to face time and non-face to face time preparing to see the patient (eg, review of tests), Obtaining and/or reviewing separately obtained history, Documenting clinical information in the electronic or other health record, Independently interpreting results (not separately reported) and communicating results to the patient/family/caregiver, or Care coordination (not separately reported).

## 2024-12-14 LAB — BACTERIA UR CULT: NO GROWTH

## 2024-12-31 ENCOUNTER — TELEPHONE (OUTPATIENT)
Dept: UROGYNECOLOGY | Facility: CLINIC | Age: 74
End: 2024-12-31
Payer: MEDICARE

## 2024-12-31 NOTE — TELEPHONE ENCOUNTER
Called and states she is wanting to know if she can change the injection ( periurethral bulking) to a sling , states she is worried that the injection will mess with her kidneys, informed that this is an injection to hold up the urethra, that it should not have an effect on her kidney. But will pass this information on to Dr Doyle.

## 2025-01-17 ENCOUNTER — HOSPITAL ENCOUNTER (OUTPATIENT)
Dept: PREADMISSION TESTING | Facility: HOSPITAL | Age: 75
Discharge: HOME | End: 2025-01-17
Attending: OBSTETRICS & GYNECOLOGY
Payer: MEDICARE

## 2025-01-17 VITALS — HEIGHT: 65 IN | BODY MASS INDEX: 34.32 KG/M2 | WEIGHT: 206 LBS

## 2025-01-17 DIAGNOSIS — Z01.818 PREOP TESTING: ICD-10-CM

## 2025-01-17 DIAGNOSIS — Z01.818 PREOP TESTING: Primary | ICD-10-CM

## 2025-01-17 LAB
ABO + RH BLD: NORMAL
ANION GAP SERPL CALC-SCNC: 9 MMOL/L (ref 8–16)
BASOPHILS # BLD AUTO: 0.04 K/UL (ref 0–0.2)
BASOPHILS NFR BLD: 0.6 % (ref 0–1.9)
BLD GP AB SCN CELLS X3 SERPL QL: NORMAL
BUN SERPL-MCNC: 26 MG/DL (ref 8–23)
CALCIUM SERPL-MCNC: 9.3 MG/DL (ref 8.7–10.5)
CHLORIDE SERPL-SCNC: 105 MMOL/L (ref 95–110)
CO2 SERPL-SCNC: 25 MMOL/L (ref 23–29)
CREAT SERPL-MCNC: 1.4 MG/DL (ref 0.5–1.4)
DIFFERENTIAL METHOD BLD: ABNORMAL
EOSINOPHIL # BLD AUTO: 0.1 K/UL (ref 0–0.5)
EOSINOPHIL NFR BLD: 2.1 % (ref 0–8)
ERYTHROCYTE [DISTWIDTH] IN BLOOD BY AUTOMATED COUNT: 13.4 % (ref 11.5–14.5)
EST. GFR  (NO RACE VARIABLE): 40 ML/MIN/1.73 M^2
GLUCOSE SERPL-MCNC: 110 MG/DL (ref 70–110)
HCT VFR BLD AUTO: 36.5 % (ref 37–48.5)
HGB BLD-MCNC: 12.1 G/DL (ref 12–16)
IMM GRANULOCYTES # BLD AUTO: 0.02 K/UL (ref 0–0.04)
IMM GRANULOCYTES NFR BLD AUTO: 0.3 % (ref 0–0.5)
LYMPHOCYTES # BLD AUTO: 1.8 K/UL (ref 1–4.8)
LYMPHOCYTES NFR BLD: 27 % (ref 18–48)
MCH RBC QN AUTO: 30.9 PG (ref 27–31)
MCHC RBC AUTO-ENTMCNC: 33.2 G/DL (ref 32–36)
MCV RBC AUTO: 93 FL (ref 82–98)
MONOCYTES # BLD AUTO: 0.6 K/UL (ref 0.3–1)
MONOCYTES NFR BLD: 8.3 % (ref 4–15)
NEUTROPHILS # BLD AUTO: 4.1 K/UL (ref 1.8–7.7)
NEUTROPHILS NFR BLD: 61.7 % (ref 38–73)
NRBC BLD-RTO: 0 /100 WBC
PLATELET # BLD AUTO: 226 K/UL (ref 150–450)
PMV BLD AUTO: 9.9 FL (ref 9.2–12.9)
POTASSIUM SERPL-SCNC: 4.5 MMOL/L (ref 3.5–5.1)
RBC # BLD AUTO: 3.92 M/UL (ref 4–5.4)
SODIUM SERPL-SCNC: 139 MMOL/L (ref 136–145)
SPECIMEN OUTDATE: NORMAL
WBC # BLD AUTO: 6.6 K/UL (ref 3.9–12.7)

## 2025-01-17 PROCEDURE — 93005 ELECTROCARDIOGRAM TRACING: CPT

## 2025-01-17 PROCEDURE — 85025 COMPLETE CBC W/AUTO DIFF WBC: CPT | Performed by: ANESTHESIOLOGY

## 2025-01-17 PROCEDURE — 86850 RBC ANTIBODY SCREEN: CPT | Performed by: OBSTETRICS & GYNECOLOGY

## 2025-01-17 PROCEDURE — 80048 BASIC METABOLIC PNL TOTAL CA: CPT | Performed by: ANESTHESIOLOGY

## 2025-01-17 PROCEDURE — 93010 ELECTROCARDIOGRAM REPORT: CPT | Mod: ,,, | Performed by: GENERAL PRACTICE

## 2025-01-17 NOTE — DISCHARGE INSTRUCTIONS
To confirm, Your doctor has instructed you that surgery is scheduled for: 1/28/25 with Dr. Doyle    Please report to [facility], Registration the morning of surgery. You must check-in and receive a wristband before going to your procedure.  [address] [place] [address] [place] [address]    Pre-Op will call the afternoon prior to surgery between 1:00 and 6:00 PM with the final arrival time.  Phone number: [phone]    PLEASE NOTE:  The surgery schedule has many variables which may affect the time of your surgery case.  Family members should be available if your surgery time changes.  Plan to be here the day of your procedure between 4-6 hours.    MEDICATIONS:  TAKE ONLY THESE MEDICATIONS WITH A SMALL SIP OF WATER THE MORNING OF YOUR PROCEDURE:    SEE LIST    DO NOT TAKE THESE MEDICATIONS 5-7 DAYS PRIOR to your procedure or per your surgeon's request:   ASPIRIN, ALEVE, ADVIL, IBUPROFEN, FISH OIL VITAMIN E, HERBALS    (May take Tylenol)    ONLY if you are prescribed any types of blood thinners such as:  Aspirin, Coumadin, Plavix, Pradaxa, Xarelto, Aggrenox, Effient, Eliquis, Savasya, Brilinta, or any other, ask your surgeon whether you should stop taking them and how long before surgery you should stop.  You may also need to verify with the prescribing physician if it is ok to stop your medication.      INSTRUCTIONS IMPORTANT!!  Do not eat or drink anything between midnight and the time of your procedure- this includes gum, mints, and candy.  EXCEPT: you may have clear liquids such as:  WATER, BLACK COFFEE, UNSWEET TEA, OR GATORADE (NO RED OR PURPLE) UP TO 2 HOURS PRIOR TO YOUR ARRIVAL TIME.  Do not smoke or drink alcoholic beverages 24 hours prior to your procedure.  Shower the night before AND the morning of your procedure with a Chlorhexidine wash such as Hibiclens or Dial antibacterial soap from the neck down.  Do not get it on your face or in your eyes.  You may use your own shampoo and face wash.  This helps your skin to be as bacteria free as possible.    If you wear contact lenses, dentures, hearing aids or glasses, bring a container to put them in during surgery and give to a family member for safe keeping.  Please leave all jewelry, piercing's and valuables at home. You must remove your false eyelashes prior to surgery.    DO NOT remove hair from the surgery site.  Do not shave the incision site unless you are given specific instructions to do so.    ONLY if you have been diagnosed with sleep apnea please bring your C-PAP machine.  ONLY if you wear home oxygen please bring your portable oxygen tank the day of your procedure.  ONLY if you have a history of OPEN HEART SURGERY you will need a clearance from your Cardiologist per Anesthesia.      ONLY for patients requiring bowel prep, written instructions will be given by your doctor's office.  ONLY if you have a neuro stimulator, please bring the controller with you the morning of surgery  ONLY if a type and screen test is needed before surgery, please return:  If your doctor has scheduled you for an overnight stay, bring a small overnight bag with any personal items you need.  Make arrangements in advance for transportation home by a responsible adult. You can not go home in an uber or a cab per hospital policy.  It is not safe to drive a vehicle during the 24 hours after anesthesia.          All  facilities and properties are tobacco free.  Smoking is NOT allowed.   If you have any questions about these instructions, call Pre-Op Admit  Nursing at [phone] or the Pre-Op Day Surgery Unit at [phone].

## 2025-01-20 LAB
OHS QRS DURATION: 102 MS
OHS QTC CALCULATION: 388 MS

## 2025-01-27 ENCOUNTER — ANESTHESIA EVENT (OUTPATIENT)
Dept: SURGERY | Facility: HOSPITAL | Age: 75
End: 2025-01-27
Payer: MEDICARE

## 2025-01-28 ENCOUNTER — HOSPITAL ENCOUNTER (OUTPATIENT)
Facility: HOSPITAL | Age: 75
Discharge: HOME | End: 2025-01-28
Attending: OBSTETRICS & GYNECOLOGY | Admitting: OBSTETRICS & GYNECOLOGY
Payer: MEDICARE

## 2025-01-28 ENCOUNTER — ANESTHESIA (OUTPATIENT)
Dept: SURGERY | Facility: HOSPITAL | Age: 75
End: 2025-01-28
Payer: MEDICARE

## 2025-01-28 DIAGNOSIS — N81.10 PROLAPSE OF ANTERIOR VAGINAL WALL: ICD-10-CM

## 2025-01-28 DIAGNOSIS — N81.11 CYSTOCELE, MIDLINE: Primary | ICD-10-CM

## 2025-01-28 PROBLEM — N39.3 STRESS INCONTINENCE: Status: ACTIVE | Noted: 2025-01-28

## 2025-01-28 PROCEDURE — 71000033 HC RECOVERY, INTIAL HOUR: Performed by: OBSTETRICS & GYNECOLOGY

## 2025-01-28 PROCEDURE — 25000003 PHARM REV CODE 250: Performed by: ANESTHESIOLOGY

## 2025-01-28 PROCEDURE — 36000706: Performed by: OBSTETRICS & GYNECOLOGY

## 2025-01-28 PROCEDURE — 88305 TISSUE EXAM BY PATHOLOGIST: CPT | Mod: TC,59 | Performed by: PATHOLOGY

## 2025-01-28 PROCEDURE — 57260 CMBN ANT PST COLPRHY: CPT | Mod: ,,, | Performed by: OBSTETRICS & GYNECOLOGY

## 2025-01-28 PROCEDURE — 63600175 PHARM REV CODE 636 W HCPCS: Performed by: STUDENT IN AN ORGANIZED HEALTH CARE EDUCATION/TRAINING PROGRAM

## 2025-01-28 PROCEDURE — 71000039 HC RECOVERY, EACH ADD'L HOUR: Performed by: OBSTETRICS & GYNECOLOGY

## 2025-01-28 PROCEDURE — 63600175 PHARM REV CODE 636 W HCPCS: Performed by: ANESTHESIOLOGY

## 2025-01-28 PROCEDURE — 36415 COLL VENOUS BLD VENIPUNCTURE: CPT | Performed by: OBSTETRICS & GYNECOLOGY

## 2025-01-28 PROCEDURE — 37000008 HC ANESTHESIA 1ST 15 MINUTES: Performed by: OBSTETRICS & GYNECOLOGY

## 2025-01-28 PROCEDURE — 71000016 HC POSTOP RECOV ADDL HR: Performed by: OBSTETRICS & GYNECOLOGY

## 2025-01-28 PROCEDURE — 36000707: Performed by: OBSTETRICS & GYNECOLOGY

## 2025-01-28 PROCEDURE — L8606 SYNTHETIC IMPLNT URINARY 1ML: HCPCS | Performed by: OBSTETRICS & GYNECOLOGY

## 2025-01-28 PROCEDURE — 25000003 PHARM REV CODE 250: Performed by: STUDENT IN AN ORGANIZED HEALTH CARE EDUCATION/TRAINING PROGRAM

## 2025-01-28 PROCEDURE — 63600175 PHARM REV CODE 636 W HCPCS: Performed by: OBSTETRICS & GYNECOLOGY

## 2025-01-28 PROCEDURE — 25000003 PHARM REV CODE 250: Performed by: OBSTETRICS & GYNECOLOGY

## 2025-01-28 PROCEDURE — 51715 ENDOSCOPIC INJECTION/IMPLANT: CPT | Mod: 51,,, | Performed by: OBSTETRICS & GYNECOLOGY

## 2025-01-28 PROCEDURE — 71000015 HC POSTOP RECOV 1ST HR: Performed by: OBSTETRICS & GYNECOLOGY

## 2025-01-28 PROCEDURE — 94799 UNLISTED PULMONARY SVC/PX: CPT

## 2025-01-28 PROCEDURE — 27201423 OPTIME MED/SURG SUP & DEVICES STERILE SUPPLY: Performed by: OBSTETRICS & GYNECOLOGY

## 2025-01-28 PROCEDURE — 37000009 HC ANESTHESIA EA ADD 15 MINS: Performed by: OBSTETRICS & GYNECOLOGY

## 2025-01-28 DEVICE — SYS BULKAMID URETH BULKING 1ML: Type: IMPLANTABLE DEVICE | Site: BLADDER | Status: FUNCTIONAL

## 2025-01-28 RX ORDER — OXYCODONE HYDROCHLORIDE 5 MG/1
5 TABLET ORAL EVERY 4 HOURS PRN
Qty: 20 TABLET | Refills: 0 | Status: SHIPPED | OUTPATIENT
Start: 2025-01-28 | End: 2025-02-04

## 2025-01-28 RX ORDER — ONDANSETRON HYDROCHLORIDE 2 MG/ML
4 INJECTION, SOLUTION INTRAVENOUS DAILY PRN
Status: DISCONTINUED | OUTPATIENT
Start: 2025-01-28 | End: 2025-01-29 | Stop reason: HOSPADM

## 2025-01-28 RX ORDER — LIDOCAINE HYDROCHLORIDE 10 MG/ML
1 INJECTION, SOLUTION EPIDURAL; INFILTRATION; INTRACAUDAL; PERINEURAL ONCE
Status: COMPLETED | OUTPATIENT
Start: 2025-01-28 | End: 2025-01-28

## 2025-01-28 RX ORDER — DEXAMETHASONE SODIUM PHOSPHATE 4 MG/ML
INJECTION, SOLUTION INTRA-ARTICULAR; INTRALESIONAL; INTRAMUSCULAR; INTRAVENOUS; SOFT TISSUE
Status: DISCONTINUED | OUTPATIENT
Start: 2025-01-28 | End: 2025-01-28

## 2025-01-28 RX ORDER — MUPIROCIN 20 MG/G
OINTMENT TOPICAL
Status: DISCONTINUED | OUTPATIENT
Start: 2025-01-28 | End: 2025-01-29 | Stop reason: HOSPADM

## 2025-01-28 RX ORDER — MIDAZOLAM HYDROCHLORIDE 1 MG/ML
INJECTION INTRAMUSCULAR; INTRAVENOUS
Status: DISCONTINUED | OUTPATIENT
Start: 2025-01-28 | End: 2025-01-28

## 2025-01-28 RX ORDER — ROCURONIUM BROMIDE 10 MG/ML
INJECTION, SOLUTION INTRAVENOUS
Status: DISCONTINUED | OUTPATIENT
Start: 2025-01-28 | End: 2025-01-28

## 2025-01-28 RX ORDER — DOCUSATE SODIUM 100 MG/1
100 CAPSULE, LIQUID FILLED ORAL 2 TIMES DAILY PRN
Qty: 60 CAPSULE | Refills: 1 | Status: SHIPPED | OUTPATIENT
Start: 2025-01-28 | End: 2025-03-29

## 2025-01-28 RX ORDER — FAMOTIDINE 20 MG/1
20 TABLET, FILM COATED ORAL
Status: COMPLETED | OUTPATIENT
Start: 2025-01-28 | End: 2025-01-28

## 2025-01-28 RX ORDER — FENTANYL CITRATE 50 UG/ML
INJECTION, SOLUTION INTRAMUSCULAR; INTRAVENOUS
Status: DISCONTINUED | OUTPATIENT
Start: 2025-01-28 | End: 2025-01-28

## 2025-01-28 RX ORDER — SUCCINYLCHOLINE CHLORIDE 20 MG/ML
INJECTION INTRAMUSCULAR; INTRAVENOUS
Status: DISCONTINUED | OUTPATIENT
Start: 2025-01-28 | End: 2025-01-28

## 2025-01-28 RX ORDER — OXYCODONE HYDROCHLORIDE 5 MG/1
5 TABLET ORAL
Status: DISCONTINUED | OUTPATIENT
Start: 2025-01-28 | End: 2025-01-29 | Stop reason: HOSPADM

## 2025-01-28 RX ORDER — FENTANYL CITRATE 50 UG/ML
25 INJECTION, SOLUTION INTRAMUSCULAR; INTRAVENOUS EVERY 5 MIN PRN
Status: DISCONTINUED | OUTPATIENT
Start: 2025-01-28 | End: 2025-01-29 | Stop reason: HOSPADM

## 2025-01-28 RX ORDER — SODIUM CHLORIDE 0.9 % (FLUSH) 0.9 %
3 SYRINGE (ML) INJECTION
Status: DISCONTINUED | OUTPATIENT
Start: 2025-01-28 | End: 2025-01-29 | Stop reason: HOSPADM

## 2025-01-28 RX ORDER — EPHEDRINE SULFATE 50 MG/ML
INJECTION, SOLUTION INTRAVENOUS
Status: DISCONTINUED | OUTPATIENT
Start: 2025-01-28 | End: 2025-01-28

## 2025-01-28 RX ORDER — ACETAMINOPHEN 10 MG/ML
INJECTION, SOLUTION INTRAVENOUS
Status: DISCONTINUED | OUTPATIENT
Start: 2025-01-28 | End: 2025-01-28

## 2025-01-28 RX ORDER — LIDOCAINE HYDROCHLORIDE 20 MG/ML
INJECTION INTRAVENOUS
Status: DISCONTINUED | OUTPATIENT
Start: 2025-01-28 | End: 2025-01-28

## 2025-01-28 RX ORDER — PROPOFOL 10 MG/ML
VIAL (ML) INTRAVENOUS
Status: DISCONTINUED | OUTPATIENT
Start: 2025-01-28 | End: 2025-01-28

## 2025-01-28 RX ORDER — CEFAZOLIN 2 G/1
2 INJECTION, POWDER, FOR SOLUTION INTRAMUSCULAR; INTRAVENOUS
Status: DISCONTINUED | OUTPATIENT
Start: 2025-01-28 | End: 2025-01-29 | Stop reason: HOSPADM

## 2025-01-28 RX ORDER — BUPIVACAINE HYDROCHLORIDE AND EPINEPHRINE 2.5; 5 MG/ML; UG/ML
INJECTION, SOLUTION EPIDURAL; INFILTRATION; INTRACAUDAL; PERINEURAL
Status: DISCONTINUED | OUTPATIENT
Start: 2025-01-28 | End: 2025-01-28 | Stop reason: HOSPADM

## 2025-01-28 RX ORDER — ONDANSETRON HYDROCHLORIDE 2 MG/ML
INJECTION, SOLUTION INTRAVENOUS
Status: DISCONTINUED | OUTPATIENT
Start: 2025-01-28 | End: 2025-01-28

## 2025-01-28 RX ORDER — LORAZEPAM 2 MG/ML
0.5 INJECTION INTRAMUSCULAR ONCE
Status: COMPLETED | OUTPATIENT
Start: 2025-01-28 | End: 2025-01-28

## 2025-01-28 RX ADMIN — ROCURONIUM BROMIDE 30 MG: 10 INJECTION, SOLUTION INTRAVENOUS at 07:01

## 2025-01-28 RX ADMIN — FENTANYL CITRATE 50 MCG: 50 INJECTION, SOLUTION INTRAMUSCULAR; INTRAVENOUS at 09:01

## 2025-01-28 RX ADMIN — MUPIROCIN 1 G: 20 OINTMENT TOPICAL at 06:01

## 2025-01-28 RX ADMIN — SUGAMMADEX 200 MG: 100 INJECTION, SOLUTION INTRAVENOUS at 10:01

## 2025-01-28 RX ADMIN — FENTANYL CITRATE 25 MCG: 50 INJECTION, SOLUTION INTRAMUSCULAR; INTRAVENOUS at 11:01

## 2025-01-28 RX ADMIN — CEFAZOLIN 2 G: 2 INJECTION, POWDER, FOR SOLUTION INTRAMUSCULAR; INTRAVENOUS at 07:01

## 2025-01-28 RX ADMIN — PHENYLEPHRINE HYDROCHLORIDE 0.2 MCG/KG/MIN: 10 INJECTION INTRAVENOUS at 07:01

## 2025-01-28 RX ADMIN — ACETAMINOPHEN 1000 MG: 10 INJECTION INTRAVENOUS at 10:01

## 2025-01-28 RX ADMIN — FENTANYL CITRATE 50 MCG: 50 INJECTION, SOLUTION INTRAMUSCULAR; INTRAVENOUS at 07:01

## 2025-01-28 RX ADMIN — LORAZEPAM 0.5 MG: 2 INJECTION INTRAMUSCULAR; INTRAVENOUS at 11:01

## 2025-01-28 RX ADMIN — SUCCINYLCHOLINE CHLORIDE 160 MG: 20 INJECTION, SOLUTION INTRAMUSCULAR; INTRAVENOUS at 07:01

## 2025-01-28 RX ADMIN — LIDOCAINE HYDROCHLORIDE 0.1 MG: 10 INJECTION, SOLUTION EPIDURAL; INFILTRATION; INTRACAUDAL; PERINEURAL at 06:01

## 2025-01-28 RX ADMIN — FENTANYL CITRATE 25 MCG: 50 INJECTION, SOLUTION INTRAMUSCULAR; INTRAVENOUS at 10:01

## 2025-01-28 RX ADMIN — GLYCOPYRROLATE 0.2 MG: 0.2 INJECTION, SOLUTION INTRAMUSCULAR; INTRAVITREAL at 07:01

## 2025-01-28 RX ADMIN — FAMOTIDINE 20 MG: 20 TABLET, FILM COATED ORAL at 06:01

## 2025-01-28 RX ADMIN — DEXAMETHASONE SODIUM PHOSPHATE 4 MG: 4 INJECTION, SOLUTION INTRA-ARTICULAR; INTRALESIONAL; INTRAMUSCULAR; INTRAVENOUS; SOFT TISSUE at 07:01

## 2025-01-28 RX ADMIN — EPHEDRINE SULFATE 15 MG: 50 INJECTION, SOLUTION INTRAMUSCULAR; INTRAVENOUS; SUBCUTANEOUS at 07:01

## 2025-01-28 RX ADMIN — EPHEDRINE SULFATE 10 MG: 50 INJECTION, SOLUTION INTRAMUSCULAR; INTRAVENOUS; SUBCUTANEOUS at 07:01

## 2025-01-28 RX ADMIN — ONDANSETRON 4 MG: 2 INJECTION INTRAMUSCULAR; INTRAVENOUS at 07:01

## 2025-01-28 RX ADMIN — SODIUM CHLORIDE, SODIUM GLUCONATE, SODIUM ACETATE, POTASSIUM CHLORIDE AND MAGNESIUM CHLORIDE: 526; 502; 368; 37; 30 INJECTION, SOLUTION INTRAVENOUS at 06:01

## 2025-01-28 RX ADMIN — PROPOFOL 150 MG: 10 INJECTION, EMULSION INTRAVENOUS at 07:01

## 2025-01-28 RX ADMIN — MIDAZOLAM HYDROCHLORIDE 1 MG: 1 INJECTION, SOLUTION INTRAMUSCULAR; INTRAVENOUS at 07:01

## 2025-01-28 RX ADMIN — OXYCODONE 5 MG: 5 TABLET ORAL at 11:01

## 2025-01-28 RX ADMIN — SODIUM CHLORIDE, SODIUM GLUCONATE, SODIUM ACETATE, POTASSIUM CHLORIDE AND MAGNESIUM CHLORIDE: 526; 502; 368; 37; 30 INJECTION, SOLUTION INTRAVENOUS at 08:01

## 2025-01-28 RX ADMIN — LIDOCAINE HYDROCHLORIDE 60 MG: 20 INJECTION, SOLUTION INTRAVENOUS at 07:01

## 2025-01-28 NOTE — TRANSFER OF CARE
"Anesthesia Transfer of Care Note    Patient: Drashana Alicea    Procedure(s) Performed: Procedure(s) (LRB):  COLPORRHAPHY, COMBINED ANTEROPOSTERIOR (N/A)  CYSTOSCOPY, WITH PERIURETHRAL BULKING AGENT INJECTION (N/A)  PERINEOPLASTY    Patient location: PACU    Anesthesia Type: general    Transport from OR: Transported from OR on 2-3 L/min O2 by NC with adequate spontaneous ventilation    Post pain: adequate analgesia    Post assessment: no apparent anesthetic complications    Post vital signs: stable    Level of consciousness: responds to stimulation and lethargic    Nausea/Vomiting: no nausea/vomiting    Complications: none    Transfer of care protocol was followed      Last vitals: Visit Vitals  BP (!) 146/65 (BP Location: Left arm, Patient Position: Lying)   Pulse 60   Temp 36.6 °C (97.9 °F) (Skin)   Resp 16   Ht 5' 5" (1.651 m)   Wt 93.4 kg (205 lb 14.6 oz)   SpO2 98%   Breastfeeding No   BMI 34.27 kg/m²     "

## 2025-01-28 NOTE — PLAN OF CARE
11:10aPt is groggy, but denies pain. States she is uncomfortable, but doesn't specify what. 11:15a Started gasping for air, and stating she could not breathe. Lungs auscultated and clear in upper and lower lobes. Sats remain above 95%. NC exchanged for a non-rebreather mask, Dr. Rivas at bedside and after evaluation, he ordered lorazepam IV. 11:20a 0.5mg given and pt relaxed. 11:25a she states breathing is better. Mask exchanged back to NC.

## 2025-01-28 NOTE — OR NURSING
Voiding trial- Cath gravity bag emptied, 100ml clear yellow urine collected. Drainage bag disconnected. 1405-Instilled 300ml sterile water to bladder. Sharma removed with difficulty.     1410-Pt ambulated to bathroom with urge to void, only voided approx 50ml clear light red urine. 1435-Attempted a second void after walking in room. Total 100ml output. Requested additional time. Will bladder scan.

## 2025-01-28 NOTE — PLAN OF CARE
Released per anethsesia. Pt quiet and relaxed, states some relief of pain. Min. Vag drainage. Breathing is unlabored. Pt asking if that (Difficulty breathing) is going to happen again. Pt given reassurance that she is doing well. VS WDL Weak effort with IS. Encouraged to keep trying. Tolerated water without nausea, medina patent and will be removed in post-op and a voiding trial will begin.

## 2025-01-28 NOTE — DISCHARGE SUMMARY
[facility]  Discharge Note  Short Stay    Procedure(s) (LRB):  COLPORRHAPHY, COMBINED ANTEROPOSTERIOR (N/A)  CYSTOSCOPY, WITH PERIURETHRAL BULKING AGENT INJECTION (N/A)  PERINEOPLASTY      OUTCOME: Patient tolerated treatment/procedure well without complication and is now ready for discharge.    DISPOSITION: Home or Self Care    FINAL DIAGNOSIS:  <principal problem not specified>    FOLLOWUP: In clinic    DISCHARGE INSTRUCTIONS:  No discharge procedures on file.     TIME SPENT ON DISCHARGE: 5 minutes    Harmony Doyle MD, MPH  Division of Urogynecology  [address] [place] [address], [address]  [place] [address]  [phone]

## 2025-01-28 NOTE — OP NOTE
[facility]  Gynecology  Operative Note    SUMMARY     Date of Procedure: 1/28/2025     Procedure: Procedure(s) (LRB):  COLPORRHAPHY, COMBINED ANTEROPOSTERIOR (N/A)  CYSTOSCOPY, WITH PERIURETHRAL BULKING AGENT INJECTION (N/A)  PERINEOPLASTY       Surgeons and Role:     * Harmony Doyle MD - Primary    Assisting Surgeon: Anca Dang FA    Pre-Operative Diagnosis: Prolapse of anterior vaginal wall [N81.10]  Stress incontinence [N39.3]    Post-Operative Diagnosis: Post-Op Diagnosis Codes:     * Prolapse of anterior vaginal wall [N81.10]     * Stress incontinence [N39.3]    Anesthesia: General    Operative Findings (including complications, if any): Stage 2 anterior and posterior prolapse. Normal appearing bladder without lesions or tumors or foreign body. Moderate trabeculations. Bilateral clear ureteral efflux.     Description of Technical Procedures:     Patient was taken to the Operating room, placed in dorsal lithotomy and prepped and draped in the normal sterile fashion. Anesthesia was administered and found to be adequate. IV antibiotics were administered for surgical prophylaxis. A medina catheter was placed to drain the bladder. EUA revealed Stage 2 anterior and posterior wall prolapse.     Two Allis clamps were used to grasp the middle portion of the anterior wall defect.  A Lone star retractor was used to aid with retraction.The anterior vaginal epithelium was infiltrated with a local anesthetic. Using Metzenbaum scissors, the anterior vaginal epithelium was incised in the midline and then carried from the bladder neck to the proximal border of the defect. The vaginal epithelium was then dissected off the underlying tissue using Kraus scissors. This was done bilaterally taking careful attention not to injure the underlying bladder. Dissection was carried out laterally towards the underside of the ischiopubic ramus and arcus tendineous fascia pelvis. Next, using interrupted  2-0 PDS sutures, the endopelvic fascia over the bladder was plicated thus reducing the prolapse. 3-4 figure-of-eight stitches were placed. Sutures were also placed laterally into the arcus tendineus. Afterwards, excess vaginal epithelium was trimmed and the remaining epithelium was approximated using a running 2-0 Vicryl suture.    The bladder was next surveyed using a 70 degree rigid cystoscope with the above findings noted. The bladder was drained using the cystoscope sheath and withdrawing the lens.      At the start of the procedure, the water inflow was adjusted to produce an adequate stream. The Bulkamid needle was then placed ¾ of the way into the needle channel of the rotatable sheath and the entire Bulkamid system was then advanced into the urethra until the bladder is visualised and inspected. The Bulkamid needle was then advanced into the needle channel on the rotatable sheath until the tip of the sheath is adjacent to the bladder neck. The sheath was then rotated to the 7 oclock position. The needed was then extend into the bladder until the 2cm sujatha on the needle is visible. The Bulkamid system was then retracted until the tip of the needle was resting on the bladder neck. The needle was then retracted into the sheath and approximately 1.5cm from the bladder neck the Bulkamid system was pressed parallel against the urethral wall and the needle is then advanced into the  submucosal tissue ensuring that the bevel of the needle was facing towards the lumen. The needle was advanced approximately 0.5cm, and the Bulkamid hydrogel was then injected until the Bulkamid cushion was visible and reached the midline of the urethral lumen. The needle was then retracted back into the rotatable sheath, and rotated to the next injection site. Subsequent injections were preformed at 5 oclock, 2 oclock and 10 oclock all along the same plane as the original injection until all (4) cushions met at the midline of the  urethral lumen. 2mls Total of Bulkamid Hydrogel was used.  Approximately 500cc of fluid was left in the bladder and upon completion of the procedure. Suprapubic pressure was exterted to mimic a cough. No fluid extravasation was noted. A 10 Irish medina cathter was inserted into the bladder for drainage so that an active voiding trial could be performed once the patient was more alert.     Next, two  Allis clamps were used to sujatha the hymenal edges at the posterior fourchette. Care was taken to make sure that the vaginal opening remained at least 2-3 finger breadths wide at the end of the perineorrhaphy. A Lone star retractor was used to aid with retraction. The perineal body and posterior vaginal epithelium was infiltrated with local anesthetic after a mery shaped area was marked on the posterior vaginal epithelium and perineal body. Using electrocautery a perineal skin incision was made. The perineal skin was dissected sharply from the underlying perineal muscles and rectovaginal septum using ayoub scissors. This dissection was carried to the posterior vaginal epithelium following the previously marked mery. Afterwards the mery shaped patch of perineal skin and posterior vaginal epithelium was excised. A midline mucosal incision was then made from the perineum to the proximal border of the rectocele. The vaginal epithelium was next dissected off  the underlying fibromuscular tissue using ayoub scissors. The surgeon's left index finger was inserted into the rectum and the fascial defects of the rectocele were clearly identified. These defects were then repaired in a site specific fashion using interrupted 2-0 vicryl suture. Next the posterior vaginal epithelium was approximated using 2-0 vicryl suture until the level of the hymen. 0-vicryl suture were used to plicate the perineal muscles using interrupted stitches. The 2-0 vicryl used to plicate the vaginal epithelium was used to close the perineal skin  completing the posterior repair and perineorrhaphy. A small relaxing incision was made along the left side of the introitus as there was old scar tissue creating a taut band once the vaginal epithelium was re-approximated. Surgical correction of the rectocele was confirmed and rectal injury excluded with a digital rectal exam following the repair.      Estimated Blood Loss (EBL): <50 mL           Implants:   Implant Name Type Inv. Item Serial No.  Lot No. LRB No. Used Action   SYS BULKAMID URETH BULKING 1ML - [id number]  SYS BULKAMID URETH BULKING 1ML  [employer] [id number] N/A 2 Implanted       Specimens:   Specimen (24h ago, onward)       Start     Ordered    01/28/25 0955  Specimen to Pathology - Surgery  Once        Comments: Pre-op Diagnosis: Prolapse of anterior vaginal wall [N81.10]Stress incontinence [N39.3]Procedure(s):COLPORRHAPHY, ANTERIORCYSTOSCOPY, WITH PERIURETHRAL BULKING AGENT INJECTION Number of specimens: 2Name of specimens: 1) ANTERIOR VAGINAL EPITHELIUM2) POSTERIOR VAGINAL EPITHELIUM     Question:  Release to patient  Answer:  Immediate    01/28/25 0954    01/28/25 0901  Specimen to Pathology - Surgery  Once,   Status:  Canceled        Comments: Pre-op Diagnosis: Prolapse of anterior vaginal wall [N81.10]Stress incontinence [N39.3]Procedure(s):COLPORRHAPHY, ANTERIORCYSTOSCOPY, WITH PERIURETHRAL BULKING AGENT INJECTION Number of specimens: 1Name of specimens: 1) ANTERIOR VAGINAL EPITHELIUM     Question:  Release to patient  Answer:  Immediate    01/28/25 0913                            Condition: Good    Disposition: PACU - hemodynamically stable.    Attestation: I was present and scrubbed for the entire procedure.      Harmony Doyle MD, MPH  Division of Urogynecology  [address] [place] [address], [address]  [place] [address] [phone]

## 2025-01-28 NOTE — DISCHARGE INSTRUCTIONS
"Discharge Instructions: After Your Surgery/Procedure  Youve just had surgery. During surgery you were given medicine called anesthesia to keep you relaxed and free of pain. After surgery you may have some pain or nausea. This is common. Here are some tips for feeling better and getting well after surgery.     Stay on schedule with your medication.   Going home  Your doctor or nurse will show you how to take care of yourself when you go home. He or she will also answer your questions. Have an adult family member or friend drive you home.      For your safety we recommend these precaution for the first 24 hours after your procedure:  Do not drive or use heavy equipment.  Do not make important decisions or sign legal papers.  Do not drink alcohol.  Have someone stay with you, if needed. He or she can watch for problems and help keep you safe.  Your concentration, balance, coordination, and judgement may be impaired for many hours after anesthesia.  Use caution when ambulating or standing up.     You may feel weak and "washed out" after anesthesia and surgery.      Subtle residual effects of general anesthesia or sedation with regional / local anesthesia can last more than 24 hours.  Rest for the remainder of the day or longer if your Doctor/Surgeon has advised you to do so.  Although you may feel normal within the first 24 hours, your reflexes and mental ability may be impaired without you realizing it.  You may feel dizzy, lightheaded or sleepy for 24 hours or longer.      Be sure to go to all follow-up visits with your doctor. And rest after your surgery for as long as your doctor tells you to.  Coping with pain  If you have pain after surgery, pain medicine will help you feel better. Take it as told, before pain becomes severe. Also, ask your doctor or pharmacist about other ways to control pain. This might be with heat, ice, or relaxation. And follow any other instructions your surgeon or nurse gives you.  Tips " for taking pain medicine  To get the best relief possible, remember these points:  Pain medicines can upset your stomach. Taking them with a little food may help.  Most pain relievers taken by mouth need at least 20 to 30 minutes to start to work.  Taking medicine on a schedule can help you remember to take it. Try to time your medicine so that you can take it before starting an activity. This might be before you get dressed, go for a walk, or sit down for dinner.  Constipation is a common side effect of pain medicines. Call your doctor before taking any medicines such as laxatives or stool softeners to help ease constipation. Also ask if you should skip any foods. Drinking lots of fluids and eating foods such as fruits and vegetables that are high in fiber can also help. Remember, do not take laxatives unless your surgeon has prescribed them.  Drinking alcohol and taking pain medicine can cause dizziness and slow your breathing. It can even be deadly. Do not drink alcohol while taking pain medicine.  Pain medicine can make you react more slowly to things. Do not drive or run machinery while taking pain medicine.  Your health care provider may tell you to take acetaminophen to help ease your pain. Ask him or her how much you are supposed to take each day. Acetaminophen or other pain relievers may interact with your prescription medicines or other over-the-counter (OTC) drugs. Some prescription medicines have acetaminophen and other ingredients. Using both prescription and OTC acetaminophen for pain can cause you to overdose. Read the labels on your OTC medicines with care. This will help you to clearly know the list of ingredients, how much to take, and any warnings. It may also help you not take too much acetaminophen. If you have questions or do not understand the information, ask your pharmacist or health care provider to explain it to you before you take the OTC medicine.  Managing nausea  Some people have an  upset stomach after surgery. This is often because of anesthesia, pain, or pain medicine, or the stress of surgery. These tips will help you handle nausea and eat healthy foods as you get better. If you were on a special food plan before surgery, ask your doctor if you should follow it while you get better. These tips may help:  Do not push yourself to eat. Your body will tell you when to eat and how much.  Start off with clear liquids and soup. They are easier to digest.  Next try semi-solid foods, such as mashed potatoes, applesauce, and gelatin, as you feel ready.  Slowly move to solid foods. Dont eat fatty, rich, or spicy foods at first.  Do not force yourself to have 3 large meals a day. Instead eat smaller amounts more often.  Take pain medicines with a small amount of solid food, such as crackers or toast, to avoid nausea.     Call your surgeon if  You still have pain an hour after taking medicine. The medicine may not be strong enough.  You feel too sleepy, dizzy, or groggy. The medicine may be too strong.  You have side effects like nausea, vomiting, or skin changes, such as rash, itching, or hives.       If you have obstructive sleep apnea  You were given anesthesia medicine during surgery to keep you comfortable and free of pain. After surgery, you may have more apnea spells because of this medicine and other medicines you were given. The spells may last longer than usual.   At home:  Keep using the continuous positive airway pressure (CPAP) device when you sleep. Unless your health care provider tells you not to, use it when you sleep, day or night. CPAP is a common device used to treat obstructive sleep apnea.  Talk with your provider before taking any pain medicine, muscle relaxants, or sedatives. Your provider will tell you about the possible dangers of taking these medicines.  © 8458-7366 [employer]. [address] [place] [address], [place] [address]. All rights reserved. This information is  not intended as a substitute for professional medical care. Always follow your healthcare professional's instructions.    Post op instructions for prevention of DVT  What is deep vein thrombosis?  Deep vein thrombosis (DVT) is the medical term for blood clots in the deep veins of the leg.  These blood clots can be dangerous.  A DVT can block a blood vessel and keep blood from getting where it needs to go.  Another problem is that the clot can travel to other parts of the body such as the lungs.  A clot that travels to the lungs is called a pulmonary embolus (PE) and can cause serious problems with breathing which can lead to death.  Am I at risk for DVT/PE?  If you are not very active, you are at risk of DVT.  Anyone confined to bed, sitting for long periods of time, recovering from surgery, etc. increases the risk of DVT.  Other risk factors are cancer diagnosis, certain medications, estrogen replacement in any form,older age, obesity, pregnancy, smoking, history of clotting disorders, and dehydration.  How will I know if I have a DVT?  Swelling in the lower leg  Pain  Warmth, redness, hardness or bulging of the vein  If you have any of these symptoms, call your doctors office right away.  Some people will not have any symptoms until the clot moves to the lungs.  What are the symptoms of a PE?  Panting, shortness of breath, or trouble breathing  Sharp, knife-like chest pain when you breathe  Coughing or coughing up blood  Rapid heartbeat  If you have any of these symptoms or get worse quickly, call 911 for emergency treatment.  How can I prevent a DVT?  Avoid long periods of inactivity and dont cross your legs--get up and walk around every hour or so.  Stay active--walking after surgery is highly encouraged.  This means you should get out of the house and walk in the neighborhood.  Going up and down stairs will not impair healing (unless advised against such activity by your doctor).    Drink plenty of  noncaffeinated, nonalcoholic fluids each day to prevent dehydration.  Wear special support stockings, if they have been advised by your doctor.  If you travel, stop at least once an hour and walk around.  Avoid smoking (assistance with stopping is available through your healthcare provider)  Always notify your doctor if you are not able to follow the post operative instructions that are given to you at the time of discharge.  It may be necessary to prescribe one of the medications available to prevent DVT.

## 2025-01-28 NOTE — ANESTHESIA PROCEDURE NOTES
Intubation    Date/Time: 1/28/2025 7:39 AM    Performed by: Hetal Mcbride CRNA  Authorized by: Angelo Babcock MD    Intubation:     Induction:  Intravenous    Intubated:  Postinduction    Mask Ventilation:  Easy mask    Attempts:  1    Attempted By:  CRNA    Method of Intubation:  Video laryngoscopy    Blade:  Gonzalez 3    Laryngeal View Grade: Grade IIA - cords partially seen      Difficult Airway Encountered?: No      Complications:  None    Airway Device:  Oral endotracheal tube    Airway Device Size:  7.0    Style/Cuff Inflation:  Cuffed (inflated to minimal occlusive pressure)    Tube secured:  21    Secured at:  The lips    Placement Verified By:  Capnometry    Complicating Factors:  None    Findings Post-Intubation:  BS equal bilateral and atraumatic/condition of teeth unchanged

## 2025-01-28 NOTE — ANESTHESIA PREPROCEDURE EVALUATION
01/28/2025  Darshana Alicea is a 74 y.o., female.      Pre-op Assessment    I have reviewed the Patient Summary Reports.     I have reviewed the Nursing Notes. I have reviewed the NPO Status.   I have reviewed the Medications.     Review of Systems  Anesthesia Hx:  No problems with previous Anesthesia                Social:  Former Smoker       Cardiovascular:     Hypertension, well controlled           hyperlipidemia                               Pulmonary:        Sleep Apnea                Renal/:  Chronic Renal Disease (stage 3), CKD renal calculi               Hepatic/GI:     GERD, well controlled                Neurological:  Neurology Normal                                      Endocrine:   Hypothyroidism        Obesity / BMI > 30, Morbid Obesity / BMI > 40  Psych:  Psychiatric History anxiety               Physical Exam  General: Well nourished, Cooperative, Alert and Oriented    Airway:  Mallampati: II   Mouth Opening: Normal  TM Distance: Normal  Neck ROM: Normal ROM    Anesthesia Plan  Type of Anesthesia, risks & benefits discussed:    Anesthesia Type: Gen ETT, Gen Supraglottic Airway, Gen Natural Airway, MAC  Intra-op Monitoring Plan: Standard ASA Monitors  Post Op Pain Control Plan: multimodal analgesia and peripheral nerve block  Induction:  IV  Airway Plan: Direct, Video and Fiberoptic, Post-Induction  Informed Consent: Informed consent signed with the Patient and all parties understand the risks and agree with anesthesia plan.  All questions answered.   ASA Score: 3  Anesthesia Plan Notes: Back scar, plan GETA.    Ready For Surgery From Anesthesia Perspective.   .

## 2025-01-28 NOTE — H&P
"[facility]  Obstetrics & Gynecology  History & Physical    Patient Name: Darshana Alicea  MRN: [id number]  Admission Date: 2025  Primary Care Provider: Mitch Marquez MD    Subjective:     Chief Complaint/Reason for Admission: Vaginal prolapse and stress urinary incontinence     History of Present Illness: 74 y.o. female  who presents today for evaluation of vaginal prolapse. Patient reports that she noticed the symptoms months ago, at least 4 months. She reports that the first symptom she noticed was that she "had a tampon hanging out". She saw a NP in [place] and they tried a pessary but it did not work as it became very painful.   Review of records indicates she saw Christinealicia Serranoaldairclovis. A # 3 ring with support and a #2 ring with support were both tried.       She reports that the bulge is less than a golf ball in size. She denies splinting to urinate or have bowel movements. She reports it has affected how she urinates. Feels that she cannot start her urine stream and she needs to concentrate and put some "effort" into thinking about urinating.      She reports new symptoms of urgency and urgency incontinence. She states she has been diagnosed with OAB. She was taking medication  as she was waking multiple times over night. Tried both Detrol and Vesicare. She underwent a sleep study and was diagnosed with sleep apnea. Has been using a BiPAP machine and only wakes 1-2 times off the medication.   She voids about 4-5 times. Does not desire to take any more medication and was happy to stop Solifenicin/ Vesicare. She reports loss of urine with a cough, sneeze or laugh which is mostly associated with an urge.Urine stream is steady but at times does not feel she empties well.      She states she has IBS- constipation. She tries to eat leafy green vegetables and a stool softeners. She does strain at times as she lion snot go daily. She denies accidental bowel leakage.     No current " facility-administered medications on file prior to encounter.     Current Outpatient Medications on File Prior to Encounter   Medication Sig    amLODIPine (NORVASC) 5 MG tablet Take 5 mg by mouth every evening.    benazepriL (LOTENSIN) 40 MG tablet Take 40 mg by mouth.    buPROPion (WELLBUTRIN XL) 300 MG 24 hr tablet Take 300 mg by mouth once daily.    busPIRone (BUSPAR) 15 MG tablet Take 15 mg by mouth 3 (three) times daily. Take 0.5 tablet (7.5 mg) by mouth twice daily    hydroCHLOROthiazide (HYDRODIURIL) 12.5 MG Tab Take 12.5 mg by mouth.    levothyroxine (SYNTHROID) 75 MCG tablet Take 75 mcg by mouth once daily.    metoprolol succinate (TOPROL-XL) 25 MG 24 hr tablet Take 1 tablet by mouth every evening.    NEXIUM 40 mg capsule Take 40 mg by mouth every morning.    pramipexole (MIRAPEX) 0.5 MG tablet Take 0.5 mg by mouth every evening.    RESTASIS 0.05 % ophthalmic emulsion Apply 1 drop to eye every 12 (twelve) hours.    rosuvastatin (CRESTOR) 20 MG tablet Take 1 tablet by mouth once daily.    beta-carotene,A,-vits C,E/mins (OCUVITE ORAL) Take 1 tablet by mouth once daily.    ergocalciferol (ERGOCALCIFEROL) 50,000 unit Cap Take 50,000 Units by mouth every 7 days.     nitroGLYCERIN (NITROSTAT) 0.4 MG SL tablet Place 0.4 mg under the tongue every 5 (five) minutes as needed for Chest pain.       Review of patient's allergies indicates:   Allergen Reactions    Lyrica [pregabalin] Swelling    Sulfa (sulfonamide antibiotics) Swelling    Toradol [ketorolac] Swelling       Past Medical History:   Diagnosis Date    Acid reflux     Anemia     Anxiety     Colon polyp     High cholesterol     Hypertension     Hypothyroidism 12/10/2023    Kidney stone     Kidney stones 10/29/2012    Sleep apnea     uses c-pap    Thyroid disease     Wears dentures     Denture - Top; Partial -Bottom     OB History    Para Term  AB Living   3 2 2   1 2   SAB IAB Ectopic Multiple Live Births                  # Outcome Date  GA Lbr Mark/2nd Weight Sex Type Anes PTL Lv   3 AB            2 Term            1 Term               Obstetric Comments    x 2     Past Surgical History:   Procedure Laterality Date    ANGIOGRAM, CORONARY, WITH LEFT HEART CATHETERIZATION N/A 2024    Procedure: Angiogram, Coronary, with Left Heart Cath;  Surgeon: Caro Baeza MD;  Location: [facility] CATH/EP LAB;  Service: Cardiology;  Laterality: N/A;    BLADDER SURGERY      h/o prolapse; suspension of bladder, transvaginal    CHOLECYSTECTOMY      CYSTOSCOPY      HYSTERECTOMY      OOPHORECTOMY      SPINAL FUSION      x 2 , neck    tonsillectomy   1964    ULNAR NERVE REPAIR       Family History       Problem Relation (Age of Onset)    Cancer Father, Paternal Grandfather    Heart disease Maternal Grandfather    Hyperlipidemia Mother    Hypothyroidism Mother          Tobacco Use    Smoking status: Former     Current packs/day: 0.00     Types: Cigarettes     Start date: 1971     Quit date: 2001     Years since quittin.1    Smokeless tobacco: Not on file   Substance and Sexual Activity    Alcohol use: No    Drug use: No    Sexual activity: Not Currently     Review of Systems  Objective:     Vital Signs (Most Recent):  Temp: 97.9 °F (36.6 °C) (25)  Pulse: 60 (25)  Resp: 16 (25)  BP: (!) 146/65 (25)  SpO2: 98 % (25) Vital Signs (24h Range):  Temp:  [97.9 °F (36.6 °C)] 97.9 °F (36.6 °C)  Pulse:  [60] 60  Resp:  [16] 16  SpO2:  [98 %] 98 %  BP: (146)/(65) 146/65     Weight: 93.4 kg (205 lb 14.6 oz)  Body mass index is 34.27 kg/m².  No LMP recorded. Patient has had a hysterectomy.    Physical Exam  Gen: A&O, NAD  Skin: warm and dry  Abdomen: soft, NT, ND  Ext: no edema    POP-Q from 10/30/24  Aa -1  Anterior Wall Ba -1  Anterior wall C -8  Cervix or cuff   Gh 4  Genital hiatus pb 2.5  perineal body tvl 9  Total vaginal length   Ap -2  Posterior wall Bp -2  Posterior wall D  n/a  Posterior fornix      without Uterus     POP-Q Stage:2      Diagnostic Results:  Urodynamic testing on 11/27/24  Testing demonstrates an obstructive voiding pattern. Her sensations appear normal and her bladder capacity is normal. She was noted to have leakage with cough at 300 mL.     Assessment/Plan:     Active Diagnoses:    Diagnosis Date Noted POA    Prolapse of anterior vaginal wall [N81.10] 01/28/2025 Unknown    Stress incontinence [N39.3] 01/28/2025 Unknown      Problems Resolved During this Admission:       Plan for anterior repair, Bulkamid injections and cystoscopy. If there is any evidence of posterior vaginal prolapse under anesthesia will plan to perform a posterior repair and perineorrhaphy as well. Informed consent was obtained prior to the day of surgery. All questions/ concerns addressed on the morning of surgery.     Harmony Doyle MD  Obstetrics & Gynecology  [facility]

## 2025-01-28 NOTE — OR NURSING
Spoke to Dr Doyle. Ok to give pt more time to void but will reinsert medina 10F if inability to void persists.

## 2025-01-28 NOTE — OR NURSING
"1240- Pt had an episode of trouble catching her breath upon first arriving to postop phase 2. There was a pattern of short inspirations without exhaling. Encouraged to slow breathing and try to exhale. Pt given O2 mask with non-rebreather from pacu with O2 at 10L and pt started to calm before the mask was placed over her face. During episode O2 sat maintained at >88% and then returned to normal range.  states that she has done this before and that he is able to calm her by "sitting behind her, rubbing her and then is able to get her to take deep breaths".  "

## 2025-01-29 ENCOUNTER — TELEPHONE (OUTPATIENT)
Dept: UROGYNECOLOGY | Facility: CLINIC | Age: 75
End: 2025-01-29
Payer: MEDICARE

## 2025-01-29 VITALS
DIASTOLIC BLOOD PRESSURE: 58 MMHG | RESPIRATION RATE: 18 BRPM | TEMPERATURE: 98 F | BODY MASS INDEX: 34.31 KG/M2 | HEIGHT: 65 IN | OXYGEN SATURATION: 96 % | WEIGHT: 205.94 LBS | SYSTOLIC BLOOD PRESSURE: 135 MMHG | HEART RATE: 64 BPM

## 2025-01-29 DIAGNOSIS — G89.18 POSTOPERATIVE PAIN: Primary | ICD-10-CM

## 2025-01-29 DIAGNOSIS — Z29.89 NEED FOR PROPHYLAXIS AGAINST URINARY TRACT INFECTION: ICD-10-CM

## 2025-01-29 RX ORDER — CEPHALEXIN 250 MG/1
250 CAPSULE ORAL EVERY 12 HOURS
Qty: 10 CAPSULE | Refills: 0 | Status: SHIPPED | OUTPATIENT
Start: 2025-01-29 | End: 2025-02-03

## 2025-01-29 RX ORDER — TRAMADOL HYDROCHLORIDE 50 MG/1
50 TABLET ORAL EVERY 6 HOURS PRN
Qty: 25 TABLET | Refills: 0 | Status: SHIPPED | OUTPATIENT
Start: 2025-01-29 | End: 2025-02-05

## 2025-01-29 NOTE — TELEPHONE ENCOUNTER
Patient reports she is doing ok. The catheter bothers her and she sees a little of blood in the urine. She has not tried an ice pack to the perineum. Using heat for her back. Spouse picked up the Ultram. Has not taken it yet. She changed her pad once today. She reports some very small clots.   Has her appointment tomorrow at 830 am for her voiding trial.       Eating and drinking without difficulty. Passing flatus but has pain when this occurs. She is ambulating periodically.     Harmony Doyle MD, MPH  Division of Urogynecology  [address] [place] [address], [address]  [place], [place] [id number] [phone]

## 2025-01-29 NOTE — OR NURSING
Pt still unable to void more than a trickle. As instructed, 10F cath inserted using sterile technique. Balloon filled with 10ml ns. Sharma attached to standard gravity bag. Pt tolerated well. Collected 300ml clear yellow urine. Statloc in place.   Pt/family instructed in cath care. Office will call pt tomorrow to give time to return to office for another voiding trial per Dr. Doyle.

## 2025-01-29 NOTE — ANESTHESIA POSTPROCEDURE EVALUATION
Anesthesia Post Evaluation    Patient: Darshana Alicea    Procedure(s) Performed: Procedure(s) (LRB):  COLPORRHAPHY, COMBINED ANTEROPOSTERIOR (N/A)  CYSTOSCOPY, WITH PERIURETHRAL BULKING AGENT INJECTION (N/A)  PERINEOPLASTY    Final Anesthesia Type: general      Patient location during evaluation: PACU  Patient participation: Yes- Able to Participate  Level of consciousness: awake and alert and oriented  Post-procedure vital signs: reviewed and stable  Pain management: adequate  Airway patency: patent    PONV status at discharge: No PONV  Anesthetic complications: no      Cardiovascular status: blood pressure returned to baseline and stable  Respiratory status: unassisted and spontaneous ventilation  Hydration status: euvolemic  Follow-up not needed.              Vitals Value Taken Time   /58 01/28/25 1430   Temp 36.4 °C (97.5 °F) 01/28/25 1245   Pulse 64 01/28/25 1430   Resp 18 01/28/25 1430   SpO2 96 % 01/28/25 1430         Event Time   Out of Recovery 12:40:00         Pain/Ashley Score: Pain Rating Prior to Med Admin: 7 (1/28/2025 11:48 AM)  Pain Rating Post Med Admin: 5 (1/28/2025 12:15 PM)  Ashley Score: 10 (1/28/2025 12:15 PM)  Modified Ashley Score: 19 (1/28/2025  4:35 PM)

## 2025-01-29 NOTE — PLAN OF CARE
Meets criteria for discharge. Discharged to home with family. Denies nausea. Tolerating fluids. Tolerable pain. Steady gait. Voiding without difficulty via cath. Discharge instructions reviewed and printed handout given. Verbalized understanding.

## 2025-01-30 ENCOUNTER — CLINICAL SUPPORT (OUTPATIENT)
Dept: UROGYNECOLOGY | Facility: CLINIC | Age: 75
End: 2025-01-30
Payer: MEDICARE

## 2025-01-30 DIAGNOSIS — Z46.6 ENCOUNTER FOR REMOVAL OF URINARY CATHETER: Primary | ICD-10-CM

## 2025-01-30 LAB — POC RESIDUAL URINE VOLUME: 22 ML (ref 0–100)

## 2025-01-30 PROCEDURE — 99499 UNLISTED E&M SERVICE: CPT | Mod: S$GLB,,, | Performed by: OBSTETRICS & GYNECOLOGY

## 2025-01-30 PROCEDURE — 51798 US URINE CAPACITY MEASURE: CPT | Mod: S$GLB,,, | Performed by: OBSTETRICS & GYNECOLOGY

## 2025-01-30 NOTE — ADDENDUM NOTE
Addended by: CARLIN RASHEED on: 1/30/2025 09:05 AM     Modules accepted: Orders     Patient scheduled for TURBT with Dr. Nathan Bah MD on 4/6/2017 at [facility]. Surgery/precert form completed and faxed per protocol. Consent signed by patient and doctor, original sent to scanning. Patient will schedule preop H&P with Dr. Gonzalez.    Patient given surgery folder. Pre and postoperative instructions reviewed. Patient instructed to discontinue blood thinners one week prior to surgery. Patient has no further questions at this time.

## 2025-01-30 NOTE — PROGRESS NOTES
Patient here today for Fill and Pull.   9 ml removed from catheter balloon.  Bladder filled with 150 ml sterile water via catheter tip syringe.  Catheter removed with no difficulty.   Patient voided 125ml into urinal.   PVR: 22ml.  Ambulated to waiting room to car for home.

## 2025-02-06 ENCOUNTER — PATIENT MESSAGE (OUTPATIENT)
Dept: UROGYNECOLOGY | Facility: CLINIC | Age: 75
End: 2025-02-06
Payer: MEDICARE

## 2025-02-06 ENCOUNTER — NURSE TRIAGE (OUTPATIENT)
Dept: ADMINISTRATIVE | Facility: CLINIC | Age: 75
End: 2025-02-06
Payer: MEDICARE

## 2025-02-06 ENCOUNTER — HOSPITAL ENCOUNTER (EMERGENCY)
Facility: HOSPITAL | Age: 75
Discharge: HOME | End: 2025-02-07
Attending: STUDENT IN AN ORGANIZED HEALTH CARE EDUCATION/TRAINING PROGRAM
Payer: MEDICARE

## 2025-02-06 ENCOUNTER — OCHSNER VIRTUAL EMERGENCY DEPARTMENT (OUTPATIENT)
Facility: CLINIC | Age: 75
End: 2025-02-06
Payer: MEDICARE

## 2025-02-06 DIAGNOSIS — R07.9 CHEST PAIN: ICD-10-CM

## 2025-02-06 DIAGNOSIS — M79.89 PAIN AND SWELLING OF RIGHT LOWER LEG: ICD-10-CM

## 2025-02-06 DIAGNOSIS — M79.661 PAIN AND SWELLING OF RIGHT LOWER LEG: ICD-10-CM

## 2025-02-06 DIAGNOSIS — M79.606 LEG PAIN: Primary | ICD-10-CM

## 2025-02-06 DIAGNOSIS — M25.551 RIGHT HIP PAIN: ICD-10-CM

## 2025-02-06 LAB
ALBUMIN SERPL BCP-MCNC: 4.1 G/DL (ref 3.5–5.2)
ALP SERPL-CCNC: 41 U/L (ref 55–135)
ALT SERPL W/O P-5'-P-CCNC: 17 U/L (ref 10–44)
ANION GAP SERPL CALC-SCNC: 10 MMOL/L (ref 8–16)
AST SERPL-CCNC: 15 U/L (ref 10–40)
BASOPHILS # BLD AUTO: 0.04 K/UL (ref 0–0.2)
BASOPHILS NFR BLD: 0.6 % (ref 0–1.9)
BILIRUB SERPL-MCNC: 0.5 MG/DL (ref 0.1–1)
BNP SERPL-MCNC: 30 PG/ML (ref 0–99)
BUN SERPL-MCNC: 18 MG/DL (ref 8–23)
CALCIUM SERPL-MCNC: 9.1 MG/DL (ref 8.7–10.5)
CHLORIDE SERPL-SCNC: 104 MMOL/L (ref 95–110)
CK SERPL-CCNC: 79 U/L (ref 20–180)
CO2 SERPL-SCNC: 27 MMOL/L (ref 23–29)
CREAT SERPL-MCNC: 1.3 MG/DL (ref 0.5–1.4)
DIFFERENTIAL METHOD BLD: ABNORMAL
EOSINOPHIL # BLD AUTO: 0.3 K/UL (ref 0–0.5)
EOSINOPHIL NFR BLD: 4.2 % (ref 0–8)
ERYTHROCYTE [DISTWIDTH] IN BLOOD BY AUTOMATED COUNT: 13.5 % (ref 11.5–14.5)
EST. GFR  (NO RACE VARIABLE): 43.4 ML/MIN/1.73 M^2
GLUCOSE SERPL-MCNC: 123 MG/DL (ref 70–110)
HCT VFR BLD AUTO: 32.7 % (ref 37–48.5)
HGB BLD-MCNC: 10.7 G/DL (ref 12–16)
IMM GRANULOCYTES # BLD AUTO: 0.05 K/UL (ref 0–0.04)
IMM GRANULOCYTES NFR BLD AUTO: 0.7 % (ref 0–0.5)
LYMPHOCYTES # BLD AUTO: 2 K/UL (ref 1–4.8)
LYMPHOCYTES NFR BLD: 28 % (ref 18–48)
MAGNESIUM SERPL-MCNC: 2 MG/DL (ref 1.6–2.6)
MCH RBC QN AUTO: 30.1 PG (ref 27–31)
MCHC RBC AUTO-ENTMCNC: 32.7 G/DL (ref 32–36)
MCV RBC AUTO: 92 FL (ref 82–98)
MONOCYTES # BLD AUTO: 0.7 K/UL (ref 0.3–1)
MONOCYTES NFR BLD: 10.5 % (ref 4–15)
NEUTROPHILS # BLD AUTO: 4 K/UL (ref 1.8–7.7)
NEUTROPHILS NFR BLD: 56 % (ref 38–73)
NRBC BLD-RTO: 0 /100 WBC
PLATELET # BLD AUTO: 223 K/UL (ref 150–450)
PMV BLD AUTO: 9.9 FL (ref 9.2–12.9)
POTASSIUM SERPL-SCNC: 3.5 MMOL/L (ref 3.5–5.1)
PROT SERPL-MCNC: 7.1 G/DL (ref 6–8.4)
RBC # BLD AUTO: 3.55 M/UL (ref 4–5.4)
SODIUM SERPL-SCNC: 141 MMOL/L (ref 136–145)
TROPONIN I SERPL HS-MCNC: 3.7 PG/ML (ref 0–14.9)
TSH SERPL DL<=0.005 MIU/L-ACNC: 1.16 UIU/ML (ref 0.34–5.6)
WBC # BLD AUTO: 7.06 K/UL (ref 3.9–12.7)

## 2025-02-06 PROCEDURE — 80053 COMPREHEN METABOLIC PANEL: CPT | Performed by: STUDENT IN AN ORGANIZED HEALTH CARE EDUCATION/TRAINING PROGRAM

## 2025-02-06 PROCEDURE — 84443 ASSAY THYROID STIM HORMONE: CPT | Performed by: STUDENT IN AN ORGANIZED HEALTH CARE EDUCATION/TRAINING PROGRAM

## 2025-02-06 PROCEDURE — 87389 HIV-1 AG W/HIV-1&-2 AB AG IA: CPT | Performed by: EMERGENCY MEDICINE

## 2025-02-06 PROCEDURE — 83880 ASSAY OF NATRIURETIC PEPTIDE: CPT | Performed by: STUDENT IN AN ORGANIZED HEALTH CARE EDUCATION/TRAINING PROGRAM

## 2025-02-06 PROCEDURE — 84484 ASSAY OF TROPONIN QUANT: CPT | Performed by: STUDENT IN AN ORGANIZED HEALTH CARE EDUCATION/TRAINING PROGRAM

## 2025-02-06 PROCEDURE — 83735 ASSAY OF MAGNESIUM: CPT | Performed by: STUDENT IN AN ORGANIZED HEALTH CARE EDUCATION/TRAINING PROGRAM

## 2025-02-06 PROCEDURE — 86803 HEPATITIS C AB TEST: CPT | Performed by: EMERGENCY MEDICINE

## 2025-02-06 PROCEDURE — 85025 COMPLETE CBC W/AUTO DIFF WBC: CPT | Performed by: STUDENT IN AN ORGANIZED HEALTH CARE EDUCATION/TRAINING PROGRAM

## 2025-02-06 PROCEDURE — 93005 ELECTROCARDIOGRAM TRACING: CPT | Performed by: INTERNAL MEDICINE

## 2025-02-06 PROCEDURE — 99285 EMERGENCY DEPT VISIT HI MDM: CPT | Mod: 25

## 2025-02-06 PROCEDURE — 82550 ASSAY OF CK (CPK): CPT | Performed by: STUDENT IN AN ORGANIZED HEALTH CARE EDUCATION/TRAINING PROGRAM

## 2025-02-06 RX ORDER — METHOCARBAMOL 500 MG/1
500 TABLET, FILM COATED ORAL
Status: COMPLETED | OUTPATIENT
Start: 2025-02-06 | End: 2025-02-07

## 2025-02-07 ENCOUNTER — TELEPHONE (OUTPATIENT)
Dept: UROGYNECOLOGY | Facility: CLINIC | Age: 75
End: 2025-02-07
Payer: MEDICARE

## 2025-02-07 ENCOUNTER — TELEPHONE (OUTPATIENT)
Facility: CLINIC | Age: 75
End: 2025-02-07
Payer: MEDICARE

## 2025-02-07 VITALS
RESPIRATION RATE: 20 BRPM | WEIGHT: 207 LBS | OXYGEN SATURATION: 99 % | TEMPERATURE: 98 F | HEIGHT: 65 IN | HEART RATE: 59 BPM | SYSTOLIC BLOOD PRESSURE: 135 MMHG | DIASTOLIC BLOOD PRESSURE: 61 MMHG | BODY MASS INDEX: 34.49 KG/M2

## 2025-02-07 LAB
HCV AB SERPL QL IA: NEGATIVE
HIV 1+2 AB+HIV1 P24 AG SERPL QL IA: NEGATIVE

## 2025-02-07 PROCEDURE — 25000003 PHARM REV CODE 250: Performed by: STUDENT IN AN ORGANIZED HEALTH CARE EDUCATION/TRAINING PROGRAM

## 2025-02-07 RX ORDER — METHOCARBAMOL 500 MG/1
500 TABLET, FILM COATED ORAL 3 TIMES DAILY PRN
Qty: 10 TABLET | Refills: 0 | Status: SHIPPED | OUTPATIENT
Start: 2025-02-07 | End: 2025-02-12

## 2025-02-07 RX ADMIN — METHOCARBAMOL 500 MG: 500 TABLET ORAL at 12:02

## 2025-02-07 NOTE — ED PROVIDER NOTES
Encounter Date: 2/6/2025       History     Chief Complaint   Patient presents with    Leg Swelling     Pt has left leg swelling states she had a surgery for prolapse vagina on the 28th of January.      HPI  74-year-old woman with a history of hyperthyroidism recent surgery for vaginal prolapse presents for bilateral leg swelling and pain since the surgery.  She denies fever chills cough congestion chest pain shortness of breath belly pain change in bowel or bladder habits.  She denies a history of heart failure.  She denies a history of blood clots.  She denies trauma to her legs.  She takes a statin  Review of patient's allergies indicates:   Allergen Reactions    Lyrica [pregabalin] Swelling    Sulfa (sulfonamide antibiotics) Swelling    Toradol [ketorolac] Swelling     Past Medical History:   Diagnosis Date    Acid reflux     Anemia     Anxiety     Colon polyp     High cholesterol     Hypertension     Hypothyroidism 12/10/2023    Kidney stone     Kidney stones 10/29/2012    Sleep apnea     uses c-pap    Thyroid disease     Wears dentures     Denture - Top; Partial -Bottom     Past Surgical History:   Procedure Laterality Date    ANGIOGRAM, CORONARY, WITH LEFT HEART CATHETERIZATION N/A 05/14/2024    Procedure: Angiogram, Coronary, with Left Heart Cath;  Surgeon: Caro Baeza MD;  Location: [facility] CATH/EP LAB;  Service: Cardiology;  Laterality: N/A;    BLADDER SURGERY  2014    h/o prolapse; suspension of bladder, transvaginal    CHOLECYSTECTOMY      COLPORRHAPHY, COMBINED ANTEROPOSTERIOR N/A 1/28/2025    Procedure: COLPORRHAPHY, COMBINED ANTEROPOSTERIOR;  Surgeon: Harmony Doyle MD;  Location: [facility] OR;  Service: Urology;  Laterality: N/A;    CYSTOSCOPY      CYSTOSCOPY WITH INJECTION OF PERIURETHRAL BULKING AGENT N/A 1/28/2025    Procedure: CYSTOSCOPY, WITH PERIURETHRAL BULKING AGENT INJECTION;  Surgeon: Harmony Doyle MD;  Location: [facility] OR;  Service: Urology;  Laterality: N/A;    HYSTERECTOMY       OOPHORECTOMY      PERINEOPLASTY  2025    Procedure: PERINEOPLASTY;  Surgeon: Harmony Doyle MD;  Location: [facility] OR;  Service: Urology;;    SPINAL FUSION      x 2 , neck    tonsillectomy   1964    ULNAR NERVE REPAIR       Family History   Problem Relation Name Age of Onset    Hyperlipidemia Mother      Hypothyroidism Mother      Cancer Father          throat    Heart disease Maternal Grandfather      Cancer Paternal Grandfather       Social History     Tobacco Use    Smoking status: Former     Current packs/day: 0.00     Types: Cigarettes     Start date: 1971     Quit date: 2001     Years since quittin.2   Substance Use Topics    Alcohol use: No    Drug use: No     Review of Systems   All other systems reviewed and are negative.      Physical Exam     Initial Vitals [25]   BP Pulse Resp Temp SpO2   (!) 151/75 67 18 98.1 °F (36.7 °C) 96 %      MAP       --         Physical Exam    Nursing note and vitals reviewed.  Constitutional: She appears well-developed. She is not diaphoretic.   HENT:   Head: Normocephalic and atraumatic.   Eyes: Right eye exhibits no discharge. Left eye exhibits no discharge.   Neck: No tracheal deviation present.   Cardiovascular:  Normal rate, regular rhythm and intact distal pulses.           Pulmonary/Chest: Breath sounds normal. No stridor. No respiratory distress.   Abdominal: Abdomen is soft. There is no abdominal tenderness.   Musculoskeletal:         General: Tenderness present.      Comments: Mild bilateral calf tenderness with some trace pedal edema, good pulses, no bruising,     Neurological: She is alert and oriented to person, place, and time.   Skin: Skin is warm and dry.         ED Course   Procedures  Labs Reviewed   CBC W/ AUTO DIFFERENTIAL - Abnormal       Result Value    WBC 7.06      RBC 3.55 (*)     Hemoglobin 10.7 (*)     Hematocrit 32.7 (*)     MCV 92      MCH 30.1      MCHC 32.7      RDW 13.5      Platelets 223      MPV 9.9       Immature Granulocytes 0.7 (*)     Gran # (ANC) 4.0      Immature Grans (Abs) 0.05 (*)     Lymph # 2.0      Mono # 0.7      Eos # 0.3      Baso # 0.04      nRBC 0      Gran % 56.0      Lymph % 28.0      Mono % 10.5      Eosinophil % 4.2      Basophil % 0.6      Differential Method Automated     COMPREHENSIVE METABOLIC PANEL - Abnormal    Sodium 141      Potassium 3.5      Chloride 104      CO2 27      Glucose 123 (*)     BUN 18      Creatinine 1.3      Calcium 9.1      Total Protein 7.1      Albumin 4.1      Total Bilirubin 0.5      Alkaline Phosphatase 41 (*)     AST 15      ALT 17      eGFR 43.4 (*)     Anion Gap 10     MAGNESIUM    Magnesium 2.0     TROPONIN I HIGH SENSITIVITY    Troponin I High Sensitivity 3.7     B-TYPE NATRIURETIC PEPTIDE    BNP 30     TSH    TSH 1.156     CK    CPK 79     HEPATITIS C ANTIBODY   HIV 1 / 2 ANTIBODY          Imaging Results              X-Ray Femur 2 AP/LAT Right (Final result)  Result time 02/07/25 02:30:54      Final result by Maria C Taylor MD (02/07/25 02:30:54)                   Impression:      No radiographic evidence of acute osseous injury or dislocation.      Electronically signed by: Maria C Taylor MD  Date:    02/07/2025  Time:    02:30               Narrative:    EXAMINATION:  XR HIP WITH PELVIS WHEN PERFORMED 2 OR 3 VIEWS RIGHT; XR FEMUR 2 VIEW RIGHT    CLINICAL HISTORY:  Pain in right hip; Pain in leg, unspecified    TECHNIQUE:  AP view of the pelvis and frog leg lateral view of the right hip were performed.    AP and lateral views of the right femur were performed.    COMPARISON:  07/31/2023    FINDINGS:  Visualized osseous and articular structures appear intact..  The bilateral femoral heads appear well seated within the acetabula.  The ilioischial and iliopectineal lines appear maintained.There is no significant pubic symphyseal or sacroiliac joint diastasis.The sacrum is partially obscured by overlying bowel gas.  The right femur appears intact.                                        X-Ray Hip 2 or 3 views Right with Pelvis when performed (Final result)  Result time 02/07/25 02:30:54      Final result by Maria C Taylor MD (02/07/25 02:30:54)                   Impression:      No radiographic evidence of acute osseous injury or dislocation.      Electronically signed by: Maria C Taylor MD  Date:    02/07/2025  Time:    02:30               Narrative:    EXAMINATION:  XR HIP WITH PELVIS WHEN PERFORMED 2 OR 3 VIEWS RIGHT; XR FEMUR 2 VIEW RIGHT    CLINICAL HISTORY:  Pain in right hip; Pain in leg, unspecified    TECHNIQUE:  AP view of the pelvis and frog leg lateral view of the right hip were performed.    AP and lateral views of the right femur were performed.    COMPARISON:  07/31/2023    FINDINGS:  Visualized osseous and articular structures appear intact..  The bilateral femoral heads appear well seated within the acetabula.  The ilioischial and iliopectineal lines appear maintained.There is no significant pubic symphyseal or sacroiliac joint diastasis.The sacrum is partially obscured by overlying bowel gas.  The right femur appears intact.                                       X-Ray Knee 1 or 2 View Right (Final result)  Result time 02/07/25 02:36:23      Final result by Maria C Taylor MD (02/07/25 02:36:23)                   Impression:      No radiographic evidence of acute osseous injury.      Electronically signed by: Maria C Taylor MD  Date:    02/07/2025  Time:    02:36               Narrative:    EXAMINATION:  XR KNEE 1 OR 2 VIEW RIGHT    CLINICAL HISTORY:  knee pain;    TECHNIQUE:  AP and lateral views of the right knee were performed.    COMPARISON:  None    FINDINGS:  Visualized osseous and articular structures are intact.  Knee alignment appears within normal limits.  No large suprapatellar joint effusion.  No large retained radiopaque foreign body.                                       X-Ray Chest AP Portable (Final result)  Result time 02/07/25 00:22:11       Final result by Johnnie Gilbert MD (02/07/25 00:22:11)                   Impression:      No acute radiographic abnormality.      Electronically signed by: Johnnie Gilbert  Date:    02/07/2025  Time:    00:22               Narrative:    EXAMINATION:  XR CHEST AP PORTABLE    CLINICAL HISTORY:  Chest Pain;    TECHNIQUE:  Single frontal view of the chest was performed.    COMPARISON:  10/18/2024    FINDINGS:  Minimal left basilar atelectasis or scarring.    Postop changes of the cervical spine.    The lungs are clear, with normal appearance of pulmonary vasculature and no pleural effusion or pneumothorax.    The cardiac silhouette is normal in size. The hilar and mediastinal contours are unremarkable.    Bones are intact.                                       US Lower Extremity Veins Right (Final result)  Result time 02/06/25 22:38:53      Final result by Maria C Taylor MD (02/06/25 22:38:53)                   Impression:      No evidence of deep venous thrombosis in the right lower extremity.      Electronically signed by: Maria C Taylor MD  Date:    02/06/2025  Time:    22:38               Narrative:    EXAMINATION:  US LOWER EXTREMITY VEINS RIGHT    CLINICAL HISTORY:  Pain in right lower leg    TECHNIQUE:  Duplex and color flow Doppler evaluation and graded compression of the right lower extremity veins was performed.    COMPARISON:  None    FINDINGS:  Right thigh veins: The common femoral, femoral, popliteal, upper greater saphenous, and deep femoral veins are patent and free of thrombus. The veins are normally compressible and have normal phasic flow and augmentation response.    Right calf veins: The visualized calf veins are patent.    Contralateral CFV: The contralateral (left) common femoral vein is patent and free of thrombus.    Miscellaneous: None                                       Medications   methocarbamoL tablet 500 mg (500 mg Oral Given 2/7/25 0012)     Medical Decision Making  74-year-old presents  for bilateral leg swelling and pain since she had a surgery, no chest pain or shortness of breath she is a little hypertensive otherwise her vitals are reassuring, on exam she has mild pedal edema bilaterally neurovascularly intact 5/5 strength dorsi and plantar flexion, push pull at the knees 5/5.  Nothing to suggest an infectious process.  DVT ultrasound ordered from triage.  This is negative.  Expanded workup negative BNP TSH within normal limits kidney function reassuring.  Given a muscle relaxer.    Overall workup without emergent etiology of leg pain requiring further diagnostic or intervention, she feels better in his ready to go home, discharge with follow up.    Amount and/or Complexity of Data Reviewed  Independent Historian: spouse     Details: Reports they does take a statin  External Data Reviewed: notes.  Labs: ordered.  Radiology: ordered and independent interpretation performed. Decision-making details documented in ED Course.  ECG/medicine tests: ordered and independent interpretation performed.     Details: EKG on my independent interpretation sinus Zander 58 beats per minute normal axis no STEMI    Risk  Prescription drug management.               ED Course as of 02/07/25 0305   Fri Feb 07, 2025 0055 I re-evaluated the patient she reports her legs feel better, she is ready to go home, I offered to obtain a full ultrasound of her left lower extremity, she would prefer to go home.  Her  then told me that she has been concerned about her right femur for some time because she had a family member had bone cancer.  Shared decision-making we will get an x-ray of her hip and femur and then dc [IC]   0304 X-rays are negative she is ready to go home repeat exam is reassuring, Return precautions/follow up instructions/treatment plan given, expressed agreement and understanding.    [IC]      ED Course User Index  [IC] Rohan Blandon MD                           Clinical Impression:  Final  diagnoses:  [M79.661, M79.89] Pain and swelling of right lower leg  [R07.9] Chest pain  [M25.551] Right hip pain  [M79.606] Leg pain (Primary)          ED Disposition Condition    Discharge Stable          ED Prescriptions       Medication Sig Dispense Start Date End Date Auth. Provider    methocarbamoL (ROBAXIN) 500 MG Tab Take 1 tablet (500 mg total) by mouth 3 (three) times daily as needed (muscle spasms). 10 tablet 2/7/2025 2/12/2025 Rohan Blandon MD          Follow-up Information       Follow up With Specialties Details Why Contact Info Additional Information    Mitch Marquez MD Family Medicine Schedule an appointment as soon as possible for a visit   [address]  [address]  [place] [address]  [phone]       [facility] - Emergency Dept Emergency Medicine Go to  As needed, If symptoms worsen [address] [address] [phone] 1st floor             Rohan Blandon MD  02/07/25 8601

## 2025-02-07 NOTE — PLAN OF CARE-OVED
[facility] Emergency Department Plan of Care Note    Referral source: Nurse On-Call      Reason for consult:  Leg swelling      Additional History:  Patient contacted nurse on-call because of development of pain and swelling in both legs, but swelling and redness worse on the right.  Started earlier today.  The patient had a colporrhaphy a little over a week ago.  Denies fever or shortness of breath.  Due to concern for cellulitis versus acute DVT, and inability to be seen at urgent care at this time of day, recommend evaluation in the emergency room.      Disposition recommended: Emergency Department    Additional Recommendations: n.a.

## 2025-02-07 NOTE — FIRST PROVIDER EVALUATION
Emergency Department TeleTriage Encounter Note      CHIEF COMPLAINT    Chief Complaint   Patient presents with    Leg Swelling     Pt has left leg swelling states she had a surgery for prolapse vagina on the 28th of January.        VITAL SIGNS   Initial Vitals [02/06/25 2120]   BP Pulse Resp Temp SpO2   (!) 151/75 67 18 98.1 °F (36.7 °C) 96 %      MAP       --            ALLERGIES    Review of patient's allergies indicates:   Allergen Reactions    Lyrica [pregabalin] Swelling    Sulfa (sulfonamide antibiotics) Swelling    Toradol [ketorolac] Swelling       PROVIDER TRIAGE NOTE  Patient presents with swelling in right lower extremity. She had surgery 1/25. No chest pain or SOB      ORDERS  Labs Reviewed   HEPATITIS C ANTIBODY   HIV 1 / 2 ANTIBODY       ED Orders (720h ago, onward)      Start Ordered     Status Ordering Provider    02/06/25 2122 02/06/25 2121  Hepatitis C Antibody  STAT         Ordered TEENA ADAIR    02/06/25 2122 02/06/25 2121  HIV 1/2 Ag/Ab (4th Gen)  STAT         Ordered TEENA ADAIR              Virtual Visit Note: The provider triage portion of this emergency department evaluation and documentation was performed via [employer], a HIPAA-compliant telemedicine application, in concert with a tele-presenter in the room. A face to face patient evaluation with one of my colleagues will occur once the patient is placed in an emergency department room.      DISCLAIMER: This note was prepared with [employer] voice recognition transcription software. Garbled syntax, mangled pronouns, and other bizarre constructions may be attributed to that software system.

## 2025-02-07 NOTE — TELEPHONE ENCOUNTER
Patient called the [facility] RN on 2/6/25 for c/o leg pain, edema, and redness. [facility] Provider Dr Mccoy disposition recommendation pt to go to ED. ED AVS instructs patient to f/u with PCP. No PCP appointment noted.  Called patient to offer assistance with scheduling appointment, no answer, LVM asking for a return call.

## 2025-02-07 NOTE — DISCHARGE INSTRUCTIONS
We do not see evidence of a blood clot on your ultrasound.  We did not see evidence of broken bone or growth on the x-rays of your leg.    Please return to this facility or another ED as needed for any new or worsening symptoms including chest pain, shortness of breath, abdominal pain, nausea or vomiting, fever or chills. Please follow up with your primary care doctor in 3 days. Please take all medicines as prescribed.

## 2025-02-07 NOTE — TELEPHONE ENCOUNTER
Patient says she had surgery on 1/28 and her legs have been bothering her. She says it looks like her veins are popping out and it's sunken in on the front of her leg. She is c/o ericka leg pain, swelling, and redness with right worse than left. Pain level 6/10. Referred to [facility]. Dr. Mccoy requested pics uploaded and reviewed them. He states patient should go to ER to be evaluated to r/o cellulitis or dvt. Patient advised and states they will go to [facility]. Please contact caller directly to discuss any further care advice.    Reason for Disposition   [1] Red area or streak [2] large (> 2 in. or 5 cm)    Additional Information   Negative: Sounds like a life-threatening emergency to the triager   Negative: [1] Widespread rash AND [2] bright red, sunburn-like   Negative: [1] SEVERE headache AND [2] after spinal (epidural) anesthesia   Negative: [1] Vomiting AND [2] persists > 4 hours   Negative: [1] Vomiting AND [2] abdomen looks much more swollen than usual   Negative: [1] Drinking very little AND [2] dehydration suspected (e.g., no urine > 12 hours, very dry mouth, very lightheaded)   Negative: Patient sounds very sick or weak to the triager   Negative: Sounds like a serious complication to the triager   Negative: Fever > 100.4 F (38.0 C)   Negative: [1] SEVERE post-op pain (e.g., excruciating, pain scale 8-10) AND [2] not controlled with pain medications   Negative: [1] Caller has URGENT question AND [2] triager unable to answer question   Negative: [1] MILD-MODERATE headache (e.g., pain scale 1-7) AND [2] after spinal (epidural) anesthesia   Negative: Fever present > 3 days (72 hours)   Negative: SEVERE difficulty breathing (e.g., struggling for each breath, speaks in single words)   Negative: Looks like a broken bone or dislocated joint (e.g., crooked or deformed)   Negative: Sounds like a life-threatening emergency to the triager   Negative: Difficulty breathing at rest   Negative: Entire foot is cool or blue in  comparison to other side   Negative: [1] Can't walk or can barely walk AND [2] new-onset   Negative: [1] Difficulty breathing with exertion (e.g., walking) AND [2] new-onset or getting worse   Negative: [1] Red area or streak AND [2] fever   Negative: [1] Swelling is painful to touch AND [2] fever   Negative: [1] Cast on leg or ankle AND [2] now increased pain   Negative: Patient sounds very sick or weak to the triager   Negative: SEVERE leg swelling (e.g., swelling extends above knee, entire leg is swollen, weeping fluid)    Protocols used: Post-Op Symptoms and [place]-B-OX, Leg Swelling and Edema-A-AH

## 2025-02-08 LAB
OHS QRS DURATION: 96 MS
OHS QTC CALCULATION: 406 MS

## 2025-02-08 NOTE — TELEPHONE ENCOUNTER
Contacted patient to see how she was feeling. Her legs are feeling better. She was provided a muscle relaxer which she states has helped. She is no longer taking the Ultram. Instructed her to not take the Ultram and muscle relaxant together. Reassured her that all the testing was normal.     Having a vaginal discharge which she was informed was normal due to the stitches. She has very light bleeding. Having regular bowel movements and taking stool softeners.   Harmony Doyle MD

## 2025-02-11 ENCOUNTER — OFFICE VISIT (OUTPATIENT)
Dept: UROGYNECOLOGY | Facility: CLINIC | Age: 75
End: 2025-02-11
Payer: MEDICARE

## 2025-02-11 DIAGNOSIS — Z09 POSTOP CHECK: ICD-10-CM

## 2025-02-11 DIAGNOSIS — R35.0 URINARY FREQUENCY: Primary | ICD-10-CM

## 2025-02-11 LAB
BILIRUBIN, UA POC OHS: NEGATIVE
BLOOD, UA POC OHS: ABNORMAL
CLARITY, UA POC OHS: CLEAR
COLOR, UA POC OHS: YELLOW
GLUCOSE, UA POC OHS: NEGATIVE
KETONES, UA POC OHS: NEGATIVE
LEUKOCYTES, UA POC OHS: ABNORMAL
NITRITE, UA POC OHS: NEGATIVE
PH, UA POC OHS: 6
POC RESIDUAL URINE VOLUME: 0 ML (ref 0–100)
PROTEIN, UA POC OHS: NEGATIVE
SPECIFIC GRAVITY, UA POC OHS: 1.02
UROBILINOGEN, UA POC OHS: 0.2

## 2025-02-11 PROCEDURE — 1101F PT FALLS ASSESS-DOCD LE1/YR: CPT | Mod: CPTII,S$GLB,, | Performed by: NURSE PRACTITIONER

## 2025-02-11 PROCEDURE — 1159F MED LIST DOCD IN RCRD: CPT | Mod: CPTII,S$GLB,, | Performed by: NURSE PRACTITIONER

## 2025-02-11 PROCEDURE — 87086 URINE CULTURE/COLONY COUNT: CPT | Performed by: NURSE PRACTITIONER

## 2025-02-11 PROCEDURE — 99999 PR PBB SHADOW E&M-EST. PATIENT-LVL III: CPT | Mod: PBBFAC,,, | Performed by: NURSE PRACTITIONER

## 2025-02-11 PROCEDURE — 1160F RVW MEDS BY RX/DR IN RCRD: CPT | Mod: CPTII,S$GLB,, | Performed by: NURSE PRACTITIONER

## 2025-02-11 PROCEDURE — 99024 POSTOP FOLLOW-UP VISIT: CPT | Mod: S$GLB,,, | Performed by: NURSE PRACTITIONER

## 2025-02-11 PROCEDURE — 51798 US URINE CAPACITY MEASURE: CPT | Mod: S$GLB,,, | Performed by: NURSE PRACTITIONER

## 2025-02-11 PROCEDURE — 3288F FALL RISK ASSESSMENT DOCD: CPT | Mod: CPTII,S$GLB,, | Performed by: NURSE PRACTITIONER

## 2025-02-11 PROCEDURE — 81003 URINALYSIS AUTO W/O SCOPE: CPT | Mod: QW,S$GLB,, | Performed by: NURSE PRACTITIONER

## 2025-02-11 PROCEDURE — 1126F AMNT PAIN NOTED NONE PRSNT: CPT | Mod: CPTII,S$GLB,, | Performed by: NURSE PRACTITIONER

## 2025-02-11 NOTE — PROGRESS NOTES
Subjective:       Patient ID: Darshana Alicea is a 74 y.o. female.    Chief Complaint: Post-op Evaluation      Darshana Alicea is a 74 y.o. female.  Who is approximately 2 weeks postop from Colporrhaphy, Combined Anteroposterior, Cystoscopy, With Periurethral Bulking Agent Injection, and Perineoplasty 1/28/2025  with Dr. Doyle.  Patient states that she denies any real pain but is having some discomfort when she is sitting down certain ways.  She has frequency times 3-4 during the day.  She has nocturia times 1-2.  She denies any PVF.  She denies any incontinence.  Bladder scan today revealed 0 mL of residual urine.  She is having some cream colored discharge that has about a quarter-size on her pad throughout the day.  She denies any vaginal itching.  She thought she saw some sutures when she was wiping the other day.  She does have some difficulty with bowel movements but this occurred even prior to surgery.  She is taking stool softeners and laxative if needed.  She is having bowel movements every other day.  She has been taking it easy and not doing too much.  Her pathology report showed chronic inflammation.  This was reviewed with the patient by Dr. Doyle.  The patient denies any other acute complaints/concerns at this time.    Review of Systems   Constitutional:  Negative for activity change, fever and unexpected weight change.   HENT:  Negative for hearing loss.    Eyes:  Negative for visual disturbance.   Respiratory:  Negative for shortness of breath and wheezing.    Cardiovascular:  Negative for chest pain, palpitations and leg swelling.   Gastrointestinal:  Negative for abdominal pain, constipation and diarrhea.   Genitourinary:  Positive for frequency and vaginal discharge. Negative for dyspareunia, dysuria, urgency and vaginal bleeding.   Musculoskeletal:  Negative for gait problem and neck pain.   Skin:  Negative for rash and wound.   Allergic/Immunologic: Negative for immunocompromised state.   Neurological:   Negative for tremors, speech difficulty and weakness.   Hematological:  Does not bruise/bleed easily.   Psychiatric/Behavioral:  Negative for agitation and confusion.        Objective:      Physical Exam  Vitals reviewed. Exam conducted with a chaperone present.   Constitutional:       General: She is not in acute distress.     Appearance: She is well-developed.   HENT:      Head: Normocephalic and atraumatic.   Neck:      Thyroid: No thyromegaly.   Pulmonary:      Effort: Pulmonary effort is normal. No respiratory distress.   Abdominal:      Palpations: Abdomen is soft.      Tenderness: There is no abdominal tenderness.      Hernia: No hernia is present.   Musculoskeletal:         General: Normal range of motion.      Cervical back: Normal range of motion.   Skin:     General: Skin is warm and dry.      Findings: No rash.   Neurological:      Mental Status: She is alert and oriented to person, place, and time.   Psychiatric:         Mood and Affect: Mood normal.         Behavior: Behavior normal.         Thought Content: Thought content normal.       Pelvic Exam:  V: No lesions. No palpable nodes.   Va:  Small amount of thin yellow discharge.  Some bleeding noted on the posterior sutures close to the introitus.  Meatus:No caruncle or stenosis  Urethra: Non tender. No suburethral masses.  No hypermobility.  No MAYE in supine position  Cx/Cuff: Normal   Uterus:  Surgically absent  Ad: No mass or tenderness.  Levators :Symmetrical. Normal tone.  Mildly tender bilaterally.  BL: Non tender  RV: No hemorrhoids.      Assessment:       1. Urinary frequency    2. Postop check        Plan:       Urinary frequency monitor  -     POCT Urinalysis(Instrument)  -     POCT Bladder Scan  -     Urine Culture High Risk    Postop check bladder scan today revealed 0 mL of residual urine.  NP reviewed postop limitations/restrictions with patient.  Patient verbalized understanding.    RTC 4 weeks with Dr. Doyle

## 2025-02-13 LAB
BACTERIA UR CULT: NORMAL
BACTERIA UR CULT: NORMAL

## 2025-02-18 ENCOUNTER — RESULTS FOLLOW-UP (OUTPATIENT)
Dept: UROGYNECOLOGY | Facility: CLINIC | Age: 75
End: 2025-02-18

## 2025-03-26 ENCOUNTER — OFFICE VISIT (OUTPATIENT)
Dept: UROGYNECOLOGY | Facility: CLINIC | Age: 75
End: 2025-03-26
Payer: MEDICARE

## 2025-03-26 VITALS — DIASTOLIC BLOOD PRESSURE: 60 MMHG | HEART RATE: 62 BPM | SYSTOLIC BLOOD PRESSURE: 153 MMHG

## 2025-03-26 DIAGNOSIS — Z48.89 POSTOPERATIVE VISIT: Primary | ICD-10-CM

## 2025-03-26 DIAGNOSIS — K59.04 CHRONIC IDIOPATHIC CONSTIPATION: ICD-10-CM

## 2025-03-26 LAB — POC RESIDUAL URINE VOLUME: 8 ML (ref 0–100)

## 2025-03-26 PROCEDURE — 99999 PR PBB SHADOW E&M-EST. PATIENT-LVL III: CPT | Mod: PBBFAC,,, | Performed by: OBSTETRICS & GYNECOLOGY

## 2025-03-26 PROCEDURE — 1159F MED LIST DOCD IN RCRD: CPT | Mod: CPTII,S$GLB,, | Performed by: OBSTETRICS & GYNECOLOGY

## 2025-03-26 PROCEDURE — 4010F ACE/ARB THERAPY RXD/TAKEN: CPT | Mod: CPTII,S$GLB,, | Performed by: OBSTETRICS & GYNECOLOGY

## 2025-03-26 PROCEDURE — 1101F PT FALLS ASSESS-DOCD LE1/YR: CPT | Mod: CPTII,S$GLB,, | Performed by: OBSTETRICS & GYNECOLOGY

## 2025-03-26 PROCEDURE — 3288F FALL RISK ASSESSMENT DOCD: CPT | Mod: CPTII,S$GLB,, | Performed by: OBSTETRICS & GYNECOLOGY

## 2025-03-26 PROCEDURE — 3078F DIAST BP <80 MM HG: CPT | Mod: CPTII,S$GLB,, | Performed by: OBSTETRICS & GYNECOLOGY

## 2025-03-26 PROCEDURE — 3077F SYST BP >= 140 MM HG: CPT | Mod: CPTII,S$GLB,, | Performed by: OBSTETRICS & GYNECOLOGY

## 2025-03-26 PROCEDURE — 1126F AMNT PAIN NOTED NONE PRSNT: CPT | Mod: CPTII,S$GLB,, | Performed by: OBSTETRICS & GYNECOLOGY

## 2025-03-26 PROCEDURE — 99024 POSTOP FOLLOW-UP VISIT: CPT | Mod: S$GLB,,, | Performed by: OBSTETRICS & GYNECOLOGY

## 2025-03-26 PROCEDURE — 51798 US URINE CAPACITY MEASURE: CPT | Mod: S$GLB,,, | Performed by: OBSTETRICS & GYNECOLOGY

## 2025-03-26 NOTE — PROGRESS NOTES
CC: Post-operative visit  S/p Anterior and Posterior repair, perineorrhaphy, Bulkamid, and cystoscopy on 25    HPI:  Patient is a 74 y.o. female  presents today for a postoperative visit. She reports that she just stopped noticing discharge as of yesterday. She reports that she feels different than prior to surgery. Feels that her vagina is closer to her rectum. She denies vaginal bleeding and pain. She denies vaginal heaviness, pressure and bulge. She denies urinary urgency and frequency. She is voiding about 4 times per day and wakes 1-2 times per night. She denies urinary incontinence both with an urge and with coughing and sneezing.   She continues to have some trouble with constipation. She is taking a stool softener/ colace which helps.       REVIEW OF SYSTEMS:  Per HPI. All other reviewed systems are negative.        PHYSICAL EXAMINATION    BP (!) 153/60   Pulse 62     General: Healthy in appearance, Well nourished, Affect Normal, NAD.  Gastrointestinal: Non tender, Non distended, No masses, guarding or rebound,   Ext: No clubbing, cyanosis, edema or varicosities.    Genitourinary- (Verbal consent obtained)  Vulva: normal without lesions, masses, atrophy or pain  Urethra: meatus central and normal in appearance, no prolapse/caruncle, no masses or discharge. Empty supine stress test was negative.   Bladder: non-tender, no masses  Vagina: No discharge or lesions, severe atrophy, no masses appreciated, Mesh erosion: n/a. Sutures still noted along the anterior and posterior vagina  [See POP-Q]  Cervix: absent  Uterus:  absent  Adnexa: non-palpable, non-tender    POP-Q Exam- pelvic organ prolapse quantitative    Aa -3  Anterior Wall Ba -3  Anterior wall C -8.5  Cervix or cuff   Gh 3    Genital hiatus pb 3    perineal body tvl 9    Total vaginal length   Ap -3  Posterior wall Bp -3  Posterior wall D n/a  Posterior formix     without Uterus      Office Visit on 2025   Component Date Value Ref  Range Status    POC Residual Urine Volume 03/26/2025 8  0 - 100 mL Final        ASSESSMENT & PLAN:    Postoperative visit  -     POCT Bladder Scan    Chronic idiopathic constipation         73 yo S/p Anterior and Posterior repair, perineorrhaphy, Bulkamid, and cystoscopy on 1/28/25 presented for a postoperative visit. She is doing well. Reassured patient that her support looks excellent. Discussed that she should wait to resume normal activities for another two weeks and then may do so slowly. Cautioned that she may find that she continues to have some discharge until the stitches dissolve entirely.. Patient is not sexually active.   Encouraged patient to continue the stool softeners to prevent constipation and discussed that it is a modifiable risk for recurrence of prolapse.     She will return in 4.5-5 months for re-evaluation.     All questions were answered today. The patient was encouraged to contact the office as needed with any additional questions or concerns.     Total time spent on visit was 16 minutes.  This includes face to face time and non-face to face time preparing to see the patient (eg, review of tests), Obtaining and/or reviewing separately obtained history, Documenting clinical information in the electronic or other health record, Independently interpreting resultsand communicating results to the patient/family/caregiver, or Care coordination.      Harmony Doyle MD

## 2025-04-30 ENCOUNTER — TELEPHONE (OUTPATIENT)
Dept: UROLOGY | Facility: CLINIC | Age: 75
End: 2025-04-30
Payer: MEDICARE

## 2025-04-30 NOTE — TELEPHONE ENCOUNTER
----- Message from Isamar sent at 4/30/2025 11:40 AM CDT -----  Type:  Same Day Appointment RequestCaller is requesting a same day appointment.  Caller declined first available appointment listed below.  Name of Caller: p When is the first available appointment? June Symptoms: significant increasing pain in the lower abdomin Best Call Back Number: [phone] Information: pt is requesting to be seen today or tomorrow if possible she has been fighting the pain she's in and cannot take it much longer. Wants to see her dr.   Please call back to advise. Thanks!

## 2025-04-30 NOTE — TELEPHONE ENCOUNTER
Spoke with patient, she is complaining of lower abdominal pain. Explained to patient we do not have any available appointments today. She will need to be seen by ED or Urgent Care. Patient voiced understanding

## 2025-05-08 ENCOUNTER — TELEPHONE (OUTPATIENT)
Dept: UROLOGY | Facility: CLINIC | Age: 75
End: 2025-05-08
Payer: MEDICARE

## 2025-05-08 NOTE — TELEPHONE ENCOUNTER
----- Message from Angely sent at 5/8/2025  9:39 AM CDT -----  Type:  Patient Requesting OrderEVAN Denise [[id number]]Does the patient already have the specialty appointment scheduled?no If yes, what is the date of that appointment?:no Additional Information:  requesting her xray and US orders be faxed over to [facility] . Fax # [phone]

## 2025-06-13 ENCOUNTER — OFFICE VISIT (OUTPATIENT)
Dept: UROLOGY | Facility: CLINIC | Age: 75
End: 2025-06-13
Payer: MEDICARE

## 2025-06-13 DIAGNOSIS — N20.0 KIDNEY STONES: Primary | ICD-10-CM

## 2025-06-13 PROCEDURE — 99999 PR PBB SHADOW E&M-EST. PATIENT-LVL III: CPT | Mod: PBBFAC,,, | Performed by: NURSE PRACTITIONER

## 2025-06-13 NOTE — PROGRESS NOTES
[facility]  Group: Felix/Avelina/Kisha/Timmy  NPs: Dilcia Irby/Jessica Contreras    Note today written by:Jessica Contreras  Date of Service: 06/13/2025      CHIEF COMPLAINT: F/u kidney stones    PRESENTING ILLNESS:    Darshana Alicea is a 74 y.o. female who presents for f/u kidney stones. Last clinic visit was 12/13/24 6/13/25  Pt presents for f/u kidney stones  5/20/24  BHANU: 0.6 cm non-obstructing right renal calculus, no hydro  KUB: right kidney obscured by bowel gas    Pt denies dysuria, gross hematuria or flank pain  No bothersome voiding complaints              12/13/24  Pt presents to clinic for f/u kidney stones  Had BHANU and KUB completed at [facility] 12/6/24, uploaded to media  KUB resulted no renal stone  BHANU: initially BHANU resulted no hydro, stones or mass. + benign complicated renal cyst and right renal non-obstructing calcification wo change  No issues regarding kidney stones since last visit  Denies dysuria, gross hematuria, flank pain, fever, chills, nausea or vomiting    Pt is following urogyn for prolapse.  11/22/24 UA tr blood. No micro completed  10/30/24 UA neg blood, leuk and nitrite  5/28/24 micro UA RBC 2    6/12/24  Had sleep study. + LORENZO, now wearing bi-pap  Stopped vesicare when started to wear bi-pap   Nocturia improved. Now only having nocturia x 1-2. Not bothersome  Denies daytime frequency. Occasional urgency if holds too long. Denies UUI  PVR 1 ml    Pt was seen in ED 5/28/24 for right back pain radiating to RLQ, felt she had kidney stone.  No ureteral stone seen on CT. Treated for MS pain and constipation.  She was given keflex for UTI. UA +1 leuk. Micro UA RBC 2, WBC 3. NO CULTURE.  Creatinine 1.1 / GFR 53.4  5/28/24 CT RSS:  The kidneys are normal in size and location. There is a 0.3 cm nonobstructing right lower pole renal calculus. There is no significant hydronephrosis. No definite obstructing ureteral calculus identified. The  urinary bladder demonstrates no significant abnormality.     Decreased coke intake   Drinking more lemonade and sprite    3/13/24  On vesicare 10 mg  PVR 83 ml  Pt reports nocturia improved to 2-3 x per night but still bothersome  Had sleep study 3/6/24 and has follow up next month with MD to review  She only voids 3 x per day. + Urgency. Denies UUI  Denies dysuria, gross hematuria, flank pain, fever, chills, nausea or vomiting  Constipation controlled with colace      11/15/23   She has a remote hx of stones.   She has seen Dr. Cárdenas in August 2022 for microscopic hematuria and underwent cysto/RGP which was normal per op note. CT scan also normal.   Was being considered for an Interstim. Did a 3 day voiding study but never heard back with results.   No gross hematuria.      She was on Detrol 2 mg BID. This stopped working and now she is on Vesicare 10 mg, just started this.      Today complaining of nocturia x 3-5. She does snore at night. Had a sleep study > 10 years ago which was reportedly normal.   During the day has some frequency but she is on a diuretic which she takes in the morning.    No incontinence.   She does have some urgency.      She does have issues with constipation. Started taking stool softener and now is having daily bowel movements.   Drinks 3 bottles of water and 1 coke.      Hx of stones treated with ESWL by Dr. Gomez.      UA today: negative for blood, leuks, nitrite   PVR today: 20 cc     Last urine culture: no documented UTIs     Family  hx: no  malignancy   Hx of CKD       REVIEW OF SYSTEMS: as stated above in hpi    PATIENT HISTORY:  Past Medical History:   Diagnosis Date    Acid reflux     Anemia     Anxiety     Colon polyp     High cholesterol     Hypertension     Hypothyroidism 12/10/2023    Kidney stone     Kidney stones 10/29/2012    Sleep apnea     uses c-pap    Thyroid disease     Wears dentures     Denture - Top; Partial -Bottom       Past Surgical History:    Procedure Laterality Date    ANGIOGRAM, CORONARY, WITH LEFT HEART CATHETERIZATION N/A 05/14/2024    Procedure: Angiogram, Coronary, with Left Heart Cath;  Surgeon: Caro Baeza MD;  Location: [facility] CATH/EP LAB;  Service: Cardiology;  Laterality: N/A;    BLADDER SURGERY  2014    h/o prolapse; suspension of bladder, transvaginal    CHOLECYSTECTOMY      COLPORRHAPHY, COMBINED ANTEROPOSTERIOR N/A 1/28/2025    Procedure: COLPORRHAPHY, COMBINED ANTEROPOSTERIOR;  Surgeon: Harmony Doyle MD;  Location: [facility] OR;  Service: Urology;  Laterality: N/A;    CYSTOSCOPY      CYSTOSCOPY WITH INJECTION OF PERIURETHRAL BULKING AGENT N/A 1/28/2025    Procedure: CYSTOSCOPY, WITH PERIURETHRAL BULKING AGENT INJECTION;  Surgeon: Harmony Doyle MD;  Location: [facility] OR;  Service: Urology;  Laterality: N/A;    HYSTERECTOMY      OOPHORECTOMY      PERINEOPLASTY  1/28/2025    Procedure: PERINEOPLASTY;  Surgeon: Harmony Doyle MD;  Location: [facility];  Service: Urology;;    SPINAL FUSION      x 2 , neck    tonsillectomy   01/01/1964    ULNAR NERVE REPAIR         Allergies:  Lyrica [pregabalin], Sulfa (sulfonamide antibiotics), and Toradol [ketorolac]      PHYSICAL EXAMINATION:  Constitutional: She is oriented to person, place, and time. She appears well-developed and well-nourished.  She is in no apparent distress.  Abdominal:  She exhibits no distension.  There is no CVA tenderness.   Neurological: She is alert and oriented to person, place, and time.   Psych: Cooperative with normal affect.      Physical Exam    LABS:      Lab Results   Component Value Date    CREATININE 1.3 02/06/2025     Lab Results   Component Value Date    HGBA1C 5.1 05/10/2024       IMPRESSION:    Encounter Diagnoses   Name Primary?    Kidney stones Yes     PLAN:  -Reviewed BHANU and KUB 5/20/25 results with pt from [facility], uploaded to media. Will continue to monitor renal stone  -Continue kidney stones prevention  General risk factors for kidney stones and the  conservative measures to prevent kidney stones in the future were discussed with the patient in detail.  The patient was encouraged to drink at least 2 liters of water a day (as long as no fluid restriction), limit iced tea and vicky as well as foods high in oxalate.  They were cautioned to try to limit salt and red meat intake. Low oxalate diet (limit spinach, rhubarb, nuts, beets, potatoes, chocolate).  No vitamin C supplements. We also discussed adding citrate to the diet with the addition of magdaleno or lemon juice to their water or alternatively with crystal light.     -RTC 6 months with imaging prior  Pt likes to have BHANU/KUB at [facility]. Pt to call a few weeks prior to f/u appt and have orders sent to [facility].    I encouraged her or any of her family members to call or email me with questions and/or concerns.    Visit today is associated with current or anticipated ongoing medical care related to this patient's single serious condition/complex condition, kidney stones    30 minutes of total time spent on the encounter, which includes face to face time and non-face to face time preparing to see the patient (eg, review of tests), Obtaining and/or reviewing separately obtained history, Documenting clinical information in the electronic or other health record, Independently interpreting results (not separately reported) and communicating results to the patient/family/caregiver, or Care coordination (not separately reported).

## 2025-07-07 ENCOUNTER — OFFICE VISIT (OUTPATIENT)
Dept: PULMONOLOGY | Facility: CLINIC | Age: 75
End: 2025-07-07
Payer: MEDICARE

## 2025-07-07 VITALS
DIASTOLIC BLOOD PRESSURE: 67 MMHG | HEIGHT: 65 IN | OXYGEN SATURATION: 97 % | BODY MASS INDEX: 35.47 KG/M2 | SYSTOLIC BLOOD PRESSURE: 134 MMHG | HEART RATE: 51 BPM | WEIGHT: 212.88 LBS

## 2025-07-07 DIAGNOSIS — Z87.891 PERSONAL HISTORY OF NICOTINE DEPENDENCE: ICD-10-CM

## 2025-07-07 DIAGNOSIS — G47.33 OSA (OBSTRUCTIVE SLEEP APNEA): Primary | ICD-10-CM

## 2025-07-07 PROBLEM — E66.01 MORBID (SEVERE) OBESITY DUE TO EXCESS CALORIES: Status: ACTIVE | Noted: 2025-07-07

## 2025-07-07 PROCEDURE — 3288F FALL RISK ASSESSMENT DOCD: CPT | Mod: CPTII,S$GLB,, | Performed by: NURSE PRACTITIONER

## 2025-07-07 PROCEDURE — 3078F DIAST BP <80 MM HG: CPT | Mod: CPTII,S$GLB,, | Performed by: NURSE PRACTITIONER

## 2025-07-07 PROCEDURE — 4010F ACE/ARB THERAPY RXD/TAKEN: CPT | Mod: CPTII,S$GLB,, | Performed by: NURSE PRACTITIONER

## 2025-07-07 PROCEDURE — 99203 OFFICE O/P NEW LOW 30 MIN: CPT | Mod: S$GLB,,, | Performed by: NURSE PRACTITIONER

## 2025-07-07 PROCEDURE — 3008F BODY MASS INDEX DOCD: CPT | Mod: CPTII,S$GLB,, | Performed by: NURSE PRACTITIONER

## 2025-07-07 PROCEDURE — 1101F PT FALLS ASSESS-DOCD LE1/YR: CPT | Mod: CPTII,S$GLB,, | Performed by: NURSE PRACTITIONER

## 2025-07-07 PROCEDURE — 3075F SYST BP GE 130 - 139MM HG: CPT | Mod: CPTII,S$GLB,, | Performed by: NURSE PRACTITIONER

## 2025-07-07 PROCEDURE — 99999 PR PBB SHADOW E&M-EST. PATIENT-LVL IV: CPT | Mod: PBBFAC,,, | Performed by: NURSE PRACTITIONER

## 2025-07-07 PROCEDURE — 1159F MED LIST DOCD IN RCRD: CPT | Mod: CPTII,S$GLB,, | Performed by: NURSE PRACTITIONER

## 2025-07-07 RX ORDER — ESOMEPRAZOLE MAGNESIUM 40 MG/1
40 CAPSULE, DELAYED RELEASE ORAL EVERY MORNING
COMMUNITY
Start: 2024-09-19

## 2025-07-07 RX ORDER — BLOOD-GLUCOSE METER
EACH MISCELLANEOUS
COMMUNITY
Start: 2025-03-21

## 2025-07-07 RX ORDER — BLOOD SUGAR DIAGNOSTIC
STRIP MISCELLANEOUS
COMMUNITY
Start: 2025-04-28

## 2025-07-07 RX ORDER — DAPAGLIFLOZIN 10 MG/1
TABLET, FILM COATED ORAL
COMMUNITY
Start: 2025-05-05

## 2025-07-07 RX ORDER — NAPROXEN 375 MG/1
TABLET ORAL
COMMUNITY

## 2025-07-07 RX ORDER — LANCETS
EACH MISCELLANEOUS
COMMUNITY
Start: 2025-04-28

## 2025-07-07 RX ORDER — ALBUTEROL SULFATE 90 UG/1
INHALANT RESPIRATORY (INHALATION)
COMMUNITY

## 2025-07-07 RX ORDER — PANTOPRAZOLE SODIUM 40 MG/1
TABLET, DELAYED RELEASE ORAL
COMMUNITY

## 2025-07-07 RX ORDER — ONDANSETRON 4 MG/1
4 TABLET, ORALLY DISINTEGRATING ORAL EVERY 6 HOURS PRN
COMMUNITY

## 2025-07-07 NOTE — PROGRESS NOTES
7/7/2025    Darshana Alicea  New Patient Consult    Chief Complaint   Patient presents with    Sleep Apnea       HPI:  07/07/2025:  In office today alone.  Denies history of lung disease. Denies current use of inhaler therapy, supplemental oxygen. Denies personal history of cancer, PE, current anticoagulation use. Does endorse history of pneumothorax during pregnancy 40 years ago - did not require chest tube placement at that time.   Former patient of Dr. Briggs for the management of LORENZO.  Patient underwent polysomnogram in March of 2024 reveal an RDI of 12.5.  Patient then underwent titration study in April 2024 revealing the need for BiPAP therapy.  Patient has been on BiPAP therapy ever since.  Currently using BiPAP nightly with great reported benefit such as less fatigue, better quality sleep, less nocturnal arousals.  Patient currently uses [facility] for CPAP supplies.  Using a fullface mask at this time however feels as if the head gear is too tight in causes tension on the back of the head/neck.  Denies shortness of breath, wheezing, chest tightness, cough, mucous production.  BIPAP compliance report reviewed from dates 06/07/2025 - 07/06/2025  Usage > = 4 hours - 100%  22/16  PS 6  AHI 4.9        Social Hx: Lives with  - two cats in the home. Retired - former [employer]. No Asbestosis exposure, Smoking Hx: Former Smoker, started at 32 YO - quit at 52 YO. Typical use 2 ppd.   Family Hx: No Lung Cancer, Sister with COPD, No Asthma  Medical Hx: No previous pneumonia ; No previous shoulder/chest surgery        The Chief Complaint is: New to me      PFSH:  Past Medical History:   Diagnosis Date    Acid reflux     Anemia     Anxiety     Colon polyp     High cholesterol     Hypertension     Hypothyroidism 12/10/2023    Kidney stone     Kidney stones 10/29/2012    Sleep apnea     uses c-pap    Thyroid disease     Wears dentures     Denture - Top; Partial -Bottom         Past Surgical History:    Procedure Laterality Date    ANGIOGRAM, CORONARY, WITH LEFT HEART CATHETERIZATION N/A 05/14/2024    Procedure: Angiogram, Coronary, with Left Heart Cath;  Surgeon: Caro Baeza MD;  Location: [facility] CATH/EP LAB;  Service: Cardiology;  Laterality: N/A;    BLADDER SURGERY  2014    h/o prolapse; suspension of bladder, transvaginal    CHOLECYSTECTOMY      COLPORRHAPHY, COMBINED ANTEROPOSTERIOR N/A 1/28/2025    Procedure: COLPORRHAPHY, COMBINED ANTEROPOSTERIOR;  Surgeon: Harmony Doyle MD;  Location: [facility] OR;  Service: Urology;  Laterality: N/A;    CYSTOSCOPY      CYSTOSCOPY WITH INJECTION OF PERIURETHRAL BULKING AGENT N/A 1/28/2025    Procedure: CYSTOSCOPY, WITH PERIURETHRAL BULKING AGENT INJECTION;  Surgeon: Harmony Doyle MD;  Location: [facility] OR;  Service: Urology;  Laterality: N/A;    HYSTERECTOMY      OOPHORECTOMY      PERINEOPLASTY  1/28/2025    Procedure: PERINEOPLASTY;  Surgeon: Harmony Doyle MD;  Location: [facility];  Service: Urology;;    SPINAL FUSION      x 2 , neck    tonsillectomy   01/01/1964    ULNAR NERVE REPAIR       Social History[1]  Family History   Problem Relation Name Age of Onset    Hyperlipidemia Mother      Hypothyroidism Mother      Cancer Father          throat    Heart disease Maternal Grandfather      Cancer Paternal Grandfather       Review of patient's allergies indicates:   Allergen Reactions    Lyrica [pregabalin] Swelling    Sulfa (sulfonamide antibiotics) Swelling    Toradol [ketorolac] Swelling         I have reviewed past medical, family, and social history.     Performance Status:The patient's activity level is no limits with regular activity.        Review of Systems   Constitutional:  Negative for activity change, appetite change, chills, diaphoresis, fatigue, fever and unexpected weight change.   HENT:  Negative for congestion, postnasal drip, rhinorrhea, sinus pressure, sinus pain, sore throat and trouble swallowing.    Eyes:  Negative for photophobia and visual  "disturbance.   Respiratory:  Negative for cough, choking, chest tightness, shortness of breath and wheezing.    Cardiovascular:  Negative for chest pain, palpitations and leg swelling.   Gastrointestinal:  Negative for abdominal distention, abdominal pain, blood in stool, constipation, diarrhea, nausea and vomiting.   Genitourinary:  Negative for difficulty urinating, dysuria, flank pain and hematuria.   Musculoskeletal:  Positive for back pain and neck pain. Negative for gait problem and joint swelling.   Skin:  Negative for rash and wound.   Allergic/Immunologic: Positive for environmental allergies. Negative for food allergies.   Neurological:  Positive for dizziness. Negative for seizures, syncope, weakness, light-headedness, numbness and headaches.   Hematological:  Does not bruise/bleed easily.   Psychiatric/Behavioral:  Positive for sleep disturbance. Negative for confusion. The patient is not nervous/anxious.          Exam:Comprehensive exam done. /67 (BP Location: Left arm, Patient Position: Sitting)   Pulse (!) 51   Ht 5' 5" (1.651 m)   Wt 96.6 kg (212 lb 13.7 oz)   SpO2 97% Comment: on room air at rest  BMI 35.42 kg/m²   Exam included Vitals as listed  Constitutional: She is oriented to person, place, and time. She appears well-developed. No distress.   Nose: Nose normal.   Mouth/Throat: Uvula is midline, oropharynx is clear and moist and mucous membranes are normal. No dental caries. No oropharyngeal exudate, posterior oropharyngeal edema, posterior oropharyngeal erythema or tonsillar abscesses.    Eyes: Pupils are equal, round, and reactive to light.   Neck: No JVD present. No thyromegaly present.   Cardiovascular: Normal rate, regular rhythm and normal heart sounds. Exam reveals no gallop and no friction rub.   No murmur heard.  Pulmonary/Chest: Effort normal and breath sounds normal. No accessory muscle usage or stridor. No apnea and no tachypnea. No respiratory distress, decreased breath " sounds, wheezes, rhonchi, rales, or tenderness.   Abdominal: Soft. She exhibits no mass. There is no tenderness. No hepatosplenomegaly, hernias and normoactive bowel sounds  Musculoskeletal: Normal range of motion. exhibits no edema.   Neurological:  alert and oriented to person, place, and time. not disoriented.   Skin: Skin is warm and dry. Capillary refill takes less 2 sec. No cyanosis or erythema. No pallor. Nails show no clubbing.   Psychiatric: normal mood and affect. behavior is normal. Judgment and thought content normal.       Radiographs (ct chest and cxr) reviewed: was not done  Patient imaging studies were reviewed and interpreted independently. My personal interpretation of most recent images include:  CXR -   CT Chest -       Labs Patient's labs were reviewed including CBC and CMP    Lab Results   Component Value Date    WBC 7.06 02/06/2025    RBC 3.55 (L) 02/06/2025    HGB 10.7 (L) 02/06/2025    HCT 32.7 (L) 02/06/2025    MCV 92 02/06/2025    MCH 30.1 02/06/2025    MCHC 32.7 02/06/2025    RDW 13.5 02/06/2025     02/06/2025    MPV 9.9 02/06/2025    GRAN 4.0 02/06/2025    GRAN 56.0 02/06/2025    LYMPH 2.0 02/06/2025    LYMPH 28.0 02/06/2025    MONO 0.7 02/06/2025    MONO 10.5 02/06/2025    EOS 0.3 02/06/2025    BASO 0.04 02/06/2025    EOSINOPHIL 4.2 02/06/2025    BASOPHIL 0.6 02/06/2025   CMP  Sodium   Date Value Ref Range Status   02/06/2025 141 136 - 145 mmol/L Final     Potassium   Date Value Ref Range Status   02/06/2025 3.5 3.5 - 5.1 mmol/L Final     Chloride   Date Value Ref Range Status   02/06/2025 104 95 - 110 mmol/L Final     CO2   Date Value Ref Range Status   02/06/2025 27 23 - 29 mmol/L Final     Glucose   Date Value Ref Range Status   02/06/2025 123 (H) 70 - 110 mg/dL Final     BUN   Date Value Ref Range Status   02/06/2025 18 8 - 23 mg/dL Final     Creatinine   Date Value Ref Range Status   02/06/2025 1.3 0.5 - 1.4 mg/dL Final     Calcium   Date Value Ref Range Status   02/06/2025  9.1 8.7 - 10.5 mg/dL Final     Total Protein   Date Value Ref Range Status   02/06/2025 7.1 6.0 - 8.4 g/dL Final     Albumin   Date Value Ref Range Status   02/06/2025 4.1 3.5 - 5.2 g/dL Final     Total Bilirubin   Date Value Ref Range Status   02/06/2025 0.5 0.1 - 1.0 mg/dL Final     Comment:     For infants and newborns, interpretation of results should be based  on gestational age, weight and in agreement with clinical  observations.    Premature Infant recommended reference ranges:  Up to 24 hours.............<8.0 mg/dL  Up to 48 hours............<12.0 mg/dL  3-5 days..................<15.0 mg/dL  6-29 days.................<15.0 mg/dL       Alkaline Phosphatase   Date Value Ref Range Status   02/06/2025 41 (L) 55 - 135 U/L Final     AST   Date Value Ref Range Status   02/06/2025 15 10 - 40 U/L Final     ALT   Date Value Ref Range Status   02/06/2025 17 10 - 44 U/L Final     Anion Gap   Date Value Ref Range Status   02/06/2025 10 8 - 16 mmol/L Final     eGFR   Date Value Ref Range Status   02/06/2025 43.4 (A) >60 mL/min/1.73 m^2 Final         PFT was not done  Pulmonary Functions Testing Results:        Plan:  Clinical impression is apparently straight forward and impression with management as below.    Darshana was seen today for sleep apnea.    Diagnoses and all orders for this visit:    LORENZO (obstructive sleep apnea)  -     HME - OTHER  -     CPAP/BIPAP SUPPLIES    Personal history of nicotine dependence        Follow up in about 1 year (around 7/7/2026), or if symptoms worsen or fail to improve.    Discussed with patient above for education the following:      Patient Instructions   BIPAP compliance report reviewed - shows great compliance!    Continue BIPAP nightly.    Will add HME order to adjust BIPAP pressures from 22/12 to 22/16 and see if that will help with overall events.    I also recommend you set up a mask fitting appointment to get fitted for a different headgear.     Continue current prescription  medication regiment. Keep follow up appointment as scheduled. Please call the office if you have any questions or concerns.            [1]   Social History  Tobacco Use    Smoking status: Former     Current packs/day: 0.00     Types: Cigarettes     Start date: 1971     Quit date: 2001     Years since quittin.6   Substance Use Topics    Alcohol use: No    Drug use: No

## 2025-07-07 NOTE — PATIENT INSTRUCTIONS
BIPAP compliance report reviewed - shows great compliance!    Continue BIPAP nightly.    Will add HME order to adjust BIPAP pressures from 22/12 to 22/16 and see if that will help with overall events.    I also recommend you set up a mask fitting appointment to get fitted for a different headgear.     Continue current prescription medication regiment. Keep follow up appointment as scheduled. Please call the office if you have any questions or concerns.

## (undated) DEVICE — SOL NACL IRR 1000ML BTL

## (undated) DEVICE — CATH DXTERITY JL35 100CM 5FR

## (undated) DEVICE — PACK CUSTOM UNIV BASIN SLI

## (undated) DEVICE — SUT VICRYL PLUS 0 CT1 36IN

## (undated) DEVICE — HOOK STAY ELAS 5MM 8EA/PK

## (undated) DEVICE — NDL SAFETY 25G X 1.5 ECLIPSE

## (undated) DEVICE — KIT GLIDESHEATH SLEND 6FR 10CM

## (undated) DEVICE — CLIPPER BLADE MOD 4406 (CAREF)

## (undated) DEVICE — CATH DXTERITY JR40 100CM 5FR

## (undated) DEVICE — TOWEL OR DISP STRL BLUE 4/PK

## (undated) DEVICE — SUT PDS II 2-0 CT-2 VIL

## (undated) DEVICE — TRAY CATH 1-LYR URIMTR 16FR

## (undated) DEVICE — GLOVE SENSICARE PI GRN 7

## (undated) DEVICE — HEMOSTAT VASC BAND REG 24CM

## (undated) DEVICE — ELECTRODE REM PLYHSV RETURN 9

## (undated) DEVICE — GUIDEWIRE INQWIRE SE 3MM JTIP

## (undated) DEVICE — Device

## (undated) DEVICE — SET CYSTO IRR DRP CHMBR 84IN

## (undated) DEVICE — SOL POVIDONE SCRUB IODINE 4 OZ

## (undated) DEVICE — GLOVE SENSICARE PI ALOE 6.5

## (undated) DEVICE — SYR 10CC LUER LOCK

## (undated) DEVICE — CATH FOLEY 3CC 10FR100% SILICO

## (undated) DEVICE — SUT CTD VICRYL 2.0

## (undated) DEVICE — KIT RETR PERI/GYN W/O STAYS

## (undated) DEVICE — DRAPE STERI INSTRUMENT 1018

## (undated) DEVICE — PAD SANITARY MAXI 11IN

## (undated) DEVICE — SOL POVIDONE PREP IODINE 4 OZ

## (undated) DEVICE — INSTRUMENT POOLE ST

## (undated) DEVICE — SLEEVE SCD EXPRESS KNEE MEDIUM

## (undated) DEVICE — TRAY DRY SPONGE SCRUB W FOAM

## (undated) DEVICE — SPONGE GAUZE 16PLY 4X4